# Patient Record
Sex: FEMALE | Race: WHITE | Employment: OTHER | URBAN - METROPOLITAN AREA
[De-identification: names, ages, dates, MRNs, and addresses within clinical notes are randomized per-mention and may not be internally consistent; named-entity substitution may affect disease eponyms.]

---

## 2020-01-08 ENCOUNTER — TRANSFERRED RECORDS (OUTPATIENT)
Dept: HEALTH INFORMATION MANAGEMENT | Facility: CLINIC | Age: 85
End: 2020-01-08

## 2020-01-27 ENCOUNTER — TRANSFERRED RECORDS (OUTPATIENT)
Dept: HEALTH INFORMATION MANAGEMENT | Facility: CLINIC | Age: 85
End: 2020-01-27

## 2020-11-11 ENCOUNTER — VIRTUAL VISIT (OUTPATIENT)
Dept: GERIATRICS | Facility: CLINIC | Age: 85
End: 2020-11-11
Payer: COMMERCIAL

## 2020-11-11 DIAGNOSIS — G30.1 LATE ONSET ALZHEIMER'S DISEASE WITHOUT BEHAVIORAL DISTURBANCE (H): ICD-10-CM

## 2020-11-11 DIAGNOSIS — H35.30 MACULAR DEGENERATION (SENILE) OF RETINA: ICD-10-CM

## 2020-11-11 DIAGNOSIS — M81.0 AGE-RELATED OSTEOPOROSIS WITHOUT CURRENT PATHOLOGICAL FRACTURE: ICD-10-CM

## 2020-11-11 DIAGNOSIS — E78.5 HYPERLIPIDEMIA LDL GOAL <130: Primary | ICD-10-CM

## 2020-11-11 DIAGNOSIS — F02.80 LATE ONSET ALZHEIMER'S DISEASE WITHOUT BEHAVIORAL DISTURBANCE (H): ICD-10-CM

## 2020-11-11 DIAGNOSIS — Z71.89 ACP (ADVANCE CARE PLANNING): ICD-10-CM

## 2020-11-11 DIAGNOSIS — I10 ESSENTIAL HYPERTENSION: ICD-10-CM

## 2020-11-11 ASSESSMENT — MIFFLIN-ST. JEOR: SCORE: 1019.21

## 2020-11-11 NOTE — PROGRESS NOTES
" [facility]  Debi Morton is being evaluated via a billable video.   The patient has been notified of following:  \"This video visit will be conducted via a call between you and your provider. We have found that certain health care needs can be provided without the need for an in-person physical exam.  This service lets us provide the care you need with a video conversation. If during the course of the call the provider feels a video visit is not appropriate, you will not be charged for this service.\"   The provider has received verbal consent for a Video Visit from the patient and or first contact? Yes  Patient/facility staff would like the video invitation sent by: Text to cell phone: [phone]  Video Start Time: 1300  Which Facility the Patient is at during the time of visit: [facility]   PRIMARY CARE PROVIDER AND CLINIC: CESAR Milligan CNP, [address] / [place] [address]  Chief Complaint   Patient presents with     [facility] Medical Record Number:  [id number]  Debi Morton is a 86 year old (11/10/1934), admitted to the above facility from home. Admitted to this facility for medical management and nursing care.  HPI:    HPI information obtained from: facility chart records, facility staff, patient report, [facility] chart review and family/first contact dtr report.   Brief Summary of Hospital Course:   HTN: cont. On quinapril, hydrochlorothiazide.  BPs typically stable.     Hyperlipidemia: cont. On atorvastatin.  No reports of GI distress.  No recent lipid panel    Alzheimer's; STML. Brain MRI done 1/20, results review during f/u Neurology visit 10/7/20.  Brain MRI: moderate volume loss with progressive atrophy of the mesial temporal structures compared to 2012, as well as progression of chronic small vessel disease. MMSE score 19/30.  CPT 4.5.  Has STML.  Started on aricept 5 mg every day at this appt.  Order to increase " aricept to 10 mg every day a month later-has not yet had dose increase.      OP: cont. On fosamax, vit D, ca+.  No reports of GERD    Macular deg.  S/p tear duct surg.  Awaiting medical records.  dtr unsure if wet or dry mac. Deg.  Has taken prednisone eye gtts in past, however dtr believes this was s/p eye surg.  Not currently taking eye gtts.       Updates on Status Since Skilled nursing Admission: STML. Repetitive statements at times. No falls. Did have low grade temp today. Rapid covid test neg. No resp. Distress.     CODE STATUS/ADVANCE DIRECTIVES DISCUSSION: DNR/DNI. POLST in chart    Patient's living condition: lives in an assisted living facility  ALLERGIES: Penicillins  PAST MEDICAL HISTORY:  has a past medical history of Gastroesophageal reflux disease, Hyperlipidemia, and Hypertension.  PAST SURGICAL HISTORY:   has a past surgical history that includes Hysterectomy.  FAMILY HISTORY: family history is not on file.  SOCIAL HISTORY:   reports that she has quit smoking. She has never used smokeless tobacco.  Current Outpatient Medications   Medication Sig Dispense Refill     alendronate (FOSAMAX) 70 MG tablet Take 1 tablet (70 mg) by mouth every 7 days 4 tablet 11     atorvastatin (LIPITOR) 10 MG tablet Take 1 tablet (10 mg) by mouth daily 30 tablet 11     calcium carbonate (TUMS) 500 MG chewable tablet Take 1 tablet (500 mg) by mouth daily 30 tablet 11     cyanocobalamin (VITAMIN B-12) 100 MCG tablet Take 1 tablet (100 mcg) by mouth daily 30 tablet 11     donepezil (ARICEPT) 10 MG tablet Take 1 tablet (10 mg) by mouth daily 30 tablet 11     hydrochlorothiazide (HYDRODIURIL) 25 MG tablet Take 1 tablet (25 mg) by mouth every other day 15 tablet 11     Multiple Vitamins-Minerals (ICAPS AREDS FORMULA) TABS Take 1 tablet by mouth daily       quinapril (ACCUPRIL) 40 MG tablet Take 1 tablet (40 mg) by mouth daily 30 tablet 11     vitamin D2 (ERGOCALCIFEROL) 62387 units (1250 mcg) capsule Take 1 capsule (50,000  "Units) by mouth once a week 4 capsule 11      discharge medications reconciled and changed, per note/orders    ROS: No chest pain, shortness of breath, fevers, chills, headache, nausea, vomiting, dysuria or bowel abnormalities.  Appetite is  normal.  No pain except occ aches.  Vitals:/77   Pulse 64   Temp 99.5  F (37.5  C)   Resp 15   Ht 1.626 m (5' 4\")   Wt 59.4 kg (131 lb)   SpO2 94%   BMI 22.49 kg/m     Limited Visit Exam done given COVID-19 precautions:  GENERAL APPEARANCE:  Alert, in no distress, cooperative  ENT:  Mouth and posterior oropharynx normal, moist mucous membranes, [place]  EYES:  EOM normal, Conjunctiva and lids normal, no drainage  NECK:  FROM  RESP:  no respiratory distress  CV:  no edema, rate-normal  M/S:   Gait and station normal  muscle strength 5/5 all 4 ext.  NEURO:   Cranial nerves 2-12 are normal tested and grossly at patient's baseline, speech clear  PSYCH:  insight and judgement impaired, memory impaired , affect and mood normal    Lab/Diagnostic data:  Recent labs in [facility] reviewed by me today.     ASSESSMENT/PLAN:  Essential hypertension  BPs currently stable  - hydrochlorothiazide (HYDRODIURIL) 25 MG tablet; Take 1 tablet (25 mg) by mouth every other day  - quinapril (ACCUPRIL) 40 MG tablet; Take 1 tablet (40 mg) by mouth daily  1. Cbc, bmp tomorrow  2. Follow BPs, HRs    Hyperlipidemia LDL goal <130  No recent lipid panel. No recent GI distress  - atorvastatin (LIPITOR) 10 MG tablet; Take 1 tablet (10 mg) by mouth daily  1. FLP, tomorrow    Late onset Alzheimer's disease without behavioral disturbance (H)  STML loss  - donepezil (ARICEPT) 10 MG tablet; Take 1 tablet (10 mg) by mouth daily  2. Start med administration per staff  3. Monitor for increased anxiety, changes in mood, behaviors  4. F/u with neurology on a prn basis  5. Monitor for GI distress. Follow po intake, wt.s    Age-related osteoporosis without current pathological fracture  No recent fxs  1. Cont. " Fosamax  2. Cont. Vit D, calcium    Macular degeneration (senile) of retina  No recent vision changes. H/o tear duct surg. Unclear if wet or dry mac. Deg.  1. dtr to make eye appt. At [facility]   2. Monitor for vision changes  3. Monitor for increased tearing.  4. Cont. AREDs    ACP (advance care planning)  Spoke with res. And dtr. Cont. DNR/DNI. POLST in chart         Electronically signed by:  CESAR Milligan CNP     Video-Visit Details  Type of service:  Video Visit  Video End Time (time video stopped): 8093  Distant Location (provider location):  [facility]

## 2020-11-11 NOTE — LETTER
"    11/11/2020        RE: Debi Morton  [address]  [address]  [facility] [address]         [facility]  Debi Morton is being evaluated via a billable video.   The patient has been notified of following:  \"This video visit will be conducted via a call between you and your provider. We have found that certain health care needs can be provided without the need for an in-person physical exam.  This service lets us provide the care you need with a video conversation. If during the course of the call the provider feels a video visit is not appropriate, you will not be charged for this service.\"   The provider has received verbal consent for a Video Visit from the patient and or first contact? Yes  Patient/facility staff would like the video invitation sent by: Text to cell phone: [phone]  Video Start Time: 1300  Which Facility the Patient is at during the time of visit: [facility]   PRIMARY CARE PROVIDER AND CLINIC: Yuri Sweeney, APRN CNP, [address] [place] [address] / [facility] [address]  Chief Complaint   Patient presents with     [facility] Medical Record Number:  [id number]  Debi Morton is a 86 year old (11/10/1934), admitted to the above facility from home. Admitted to this facility for medical management and nursing care.  HPI:    HPI information obtained from: facility chart records, facility staff, patient report, [facility] chart review and family/first contact dtr report.   Brief Summary of Hospital Course:   HTN: cont. On quinapril, hydrochlorothiazide.  BPs typically stable.     Hyperlipidemia: cont. On atorvastatin.  No reports of GI distress.  No recent lipid panel    Alzheimer's; STML. Brain MRI done 1/20, results review during f/u Neurology visit 10/7/20.  Brain MRI: moderate volume loss with progressive atrophy of the mesial temporal structures compared to 2012, as well as progression of chronic " small vessel disease. MMSE score 19/30.  CPT 4.5.  Has STML.  Started on aricept 5 mg every day at this appt.  Order to increase aricept to 10 mg every day a month later-has not yet had dose increase.      OP: cont. On fosamax, vit D, ca+.  No reports of GERD    Macular deg.  S/p tear duct surg.  Awaiting medical records.  dtr unsure if wet or dry mac. Deg.  Has taken prednisone eye gtts in past, however dtr believes this was s/p eye surg.  Not currently taking eye gtts.       Updates on Status Since Skilled nursing Admission: STML. Repetitive statements at times. No falls. Did have low grade temp today. Rapid covid test neg. No resp. Distress.     CODE STATUS/ADVANCE DIRECTIVES DISCUSSION: DNR/DNI. POLST in chart    Patient's living condition: lives in an assisted living facility  ALLERGIES: Penicillins  PAST MEDICAL HISTORY:  has a past medical history of Gastroesophageal reflux disease, Hyperlipidemia, and Hypertension.  PAST SURGICAL HISTORY:   has a past surgical history that includes Hysterectomy.  FAMILY HISTORY: family history is not on file.  SOCIAL HISTORY:   reports that she has quit smoking. She has never used smokeless tobacco.  Current Outpatient Medications   Medication Sig Dispense Refill     alendronate (FOSAMAX) 70 MG tablet Take 1 tablet (70 mg) by mouth every 7 days 4 tablet 11     atorvastatin (LIPITOR) 10 MG tablet Take 1 tablet (10 mg) by mouth daily 30 tablet 11     calcium carbonate (TUMS) 500 MG chewable tablet Take 1 tablet (500 mg) by mouth daily 30 tablet 11     cyanocobalamin (VITAMIN B-12) 100 MCG tablet Take 1 tablet (100 mcg) by mouth daily 30 tablet 11     donepezil (ARICEPT) 10 MG tablet Take 1 tablet (10 mg) by mouth daily 30 tablet 11     hydrochlorothiazide (HYDRODIURIL) 25 MG tablet Take 1 tablet (25 mg) by mouth every other day 15 tablet 11     Multiple Vitamins-Minerals (ICAPS AREDS FORMULA) TABS Take 1 tablet by mouth daily       quinapril (ACCUPRIL) 40 MG tablet Take 1  "tablet (40 mg) by mouth daily 30 tablet 11     vitamin D2 (ERGOCALCIFEROL) 82074 units (1250 mcg) capsule Take 1 capsule (50,000 Units) by mouth once a week 4 capsule 11      discharge medications reconciled and changed, per note/orders    ROS: No chest pain, shortness of breath, fevers, chills, headache, nausea, vomiting, dysuria or bowel abnormalities.  Appetite is  normal.  No pain except occ aches.  Vitals:/77   Pulse 64   Temp 99.5  F (37.5  C)   Resp 15   Ht 1.626 m (5' 4\")   Wt 59.4 kg (131 lb)   SpO2 94%   BMI 22.49 kg/m     Limited Visit Exam done given COVID-19 precautions:  GENERAL APPEARANCE:  Alert, in no distress, cooperative  ENT:  Mouth and posterior oropharynx normal, moist mucous membranes, [place]  EYES:  EOM normal, Conjunctiva and lids normal, no drainage  NECK:  FROM  RESP:  no respiratory distress  CV:  no edema, rate-normal  M/S:   Gait and station normal  muscle strength 5/5 all 4 ext.  NEURO:   Cranial nerves 2-12 are normal tested and grossly at patient's baseline, speech clear  PSYCH:  insight and judgement impaired, memory impaired , affect and mood normal    Lab/Diagnostic data:  Recent labs in [facility] reviewed by me today.     ASSESSMENT/PLAN:  Essential hypertension  BPs currently stable  - hydrochlorothiazide (HYDRODIURIL) 25 MG tablet; Take 1 tablet (25 mg) by mouth every other day  - quinapril (ACCUPRIL) 40 MG tablet; Take 1 tablet (40 mg) by mouth daily  1. Cbc, bmp tomorrow  2. Follow BPs, HRs    Hyperlipidemia LDL goal <130  No recent lipid panel. No recent GI distress  - atorvastatin (LIPITOR) 10 MG tablet; Take 1 tablet (10 mg) by mouth daily  1. FLP, tomorrow    Late onset Alzheimer's disease without behavioral disturbance (H)  STML loss  - donepezil (ARICEPT) 10 MG tablet; Take 1 tablet (10 mg) by mouth daily  2. Start med administration per staff  3. Monitor for increased anxiety, changes in mood, behaviors  4. F/u with neurology on a prn basis  5. Monitor for GI " distress. Follow po intake, wt.s    Age-related osteoporosis without current pathological fracture  No recent fxs  1. Cont. Fosamax  2. Cont. Vit D, calcium    Macular degeneration (senile) of retina  No recent vision changes. H/o tear duct surg. Unclear if wet or dry mac. Deg.  1. dtr to make eye appt. At [facility]   2. Monitor for vision changes  3. Monitor for increased tearing.  4. Cont. AREDs    ACP (advance care planning)  Spoke with res. And dtr. Cont. DNR/DNI. POLST in chart         Electronically signed by:  CESAR Milligan CNP     Video-Visit Details  Type of service:  Video Visit  Video End Time (time video stopped): 1339  Distant Location (provider location):  [facility]                   Sincerely,        CESAR Milligan CNP

## 2020-11-12 ENCOUNTER — AMBULATORY - HEALTHEAST (OUTPATIENT)
Dept: OTHER | Facility: CLINIC | Age: 85
End: 2020-11-12

## 2020-11-12 ENCOUNTER — DOCUMENTATION ONLY (OUTPATIENT)
Dept: OTHER | Facility: CLINIC | Age: 85
End: 2020-11-12

## 2020-11-12 VITALS
OXYGEN SATURATION: 94 % | TEMPERATURE: 99.5 F | RESPIRATION RATE: 15 BRPM | HEIGHT: 64 IN | DIASTOLIC BLOOD PRESSURE: 77 MMHG | SYSTOLIC BLOOD PRESSURE: 133 MMHG | HEART RATE: 64 BPM | BODY MASS INDEX: 22.36 KG/M2 | WEIGHT: 131 LBS

## 2020-11-12 RX ORDER — ATORVASTATIN CALCIUM 10 MG/1
10 TABLET, FILM COATED ORAL DAILY
Qty: 30 TABLET | Refills: 11 | Status: SHIPPED | OUTPATIENT
Start: 2020-11-12 | End: 2021-01-01

## 2020-11-12 RX ORDER — VIT A/VIT C/VIT E/ZINC/COPPER 7160-113
1 TABLET, DELAYED RELEASE (ENTERIC COATED) ORAL DAILY
COMMUNITY
End: 2020-11-12

## 2020-11-12 RX ORDER — ALENDRONATE SODIUM 70 MG/1
70 TABLET ORAL
Qty: 4 TABLET | Refills: 11 | Status: SHIPPED | OUTPATIENT
Start: 2020-11-12 | End: 2021-01-01

## 2020-11-12 RX ORDER — CALCIUM CARBONATE 500 MG/1
1 TABLET, CHEWABLE ORAL DAILY
Qty: 30 TABLET | Refills: 11 | Status: SHIPPED | OUTPATIENT
Start: 2020-11-12 | End: 2021-01-01

## 2020-11-12 RX ORDER — VIT A/VIT C/VIT E/ZINC/COPPER 2148-113
1 TABLET ORAL DAILY
Qty: 30 TABLET | Refills: 11 | Status: SHIPPED | OUTPATIENT
Start: 2020-11-12 | End: 2021-01-01

## 2020-11-12 RX ORDER — DONEPEZIL HYDROCHLORIDE 10 MG/1
10 TABLET, FILM COATED ORAL DAILY
Qty: 30 TABLET | Refills: 11 | Status: SHIPPED | OUTPATIENT
Start: 2020-11-12 | End: 2021-01-01

## 2020-11-12 RX ORDER — ERGOCALCIFEROL 1.25 MG/1
50000 CAPSULE, LIQUID FILLED ORAL WEEKLY
Qty: 4 CAPSULE | Refills: 11 | Status: SHIPPED | OUTPATIENT
Start: 2020-11-12 | End: 2020-11-23

## 2020-11-12 RX ORDER — UBIDECARENONE 75 MG
100 CAPSULE ORAL DAILY
Qty: 30 TABLET | Refills: 11 | Status: SHIPPED | OUTPATIENT
Start: 2020-11-12 | End: 2021-01-01

## 2020-11-12 RX ORDER — QUINAPRIL 40 MG/1
40 TABLET ORAL DAILY
Qty: 30 TABLET | Refills: 11 | Status: SHIPPED | OUTPATIENT
Start: 2020-11-12 | End: 2021-01-01

## 2020-11-12 RX ORDER — HYDROCHLOROTHIAZIDE 25 MG/1
25 TABLET ORAL EVERY OTHER DAY
Qty: 15 TABLET | Refills: 11 | Status: SHIPPED | OUTPATIENT
Start: 2020-11-12 | End: 2021-01-01

## 2020-11-16 ENCOUNTER — RECORDS - HEALTHEAST (OUTPATIENT)
Dept: LAB | Facility: CLINIC | Age: 85
End: 2020-11-16

## 2020-11-16 LAB
ALT SERPL W P-5'-P-CCNC: 9 U/L (ref 0–45)
ANION GAP SERPL CALCULATED.3IONS-SCNC: 9 MMOL/L (ref 5–18)
AST SERPL W P-5'-P-CCNC: 16 U/L (ref 0–40)
BASOPHILS # BLD AUTO: 0.1 THOU/UL (ref 0–0.2)
BASOPHILS NFR BLD AUTO: 1 % (ref 0–2)
BUN SERPL-MCNC: 24 MG/DL (ref 8–28)
CALCIUM SERPL-MCNC: 9.9 MG/DL (ref 8.5–10.5)
CHLORIDE BLD-SCNC: 100 MMOL/L (ref 98–107)
CHOLEST SERPL-MCNC: 171 MG/DL
CO2 SERPL-SCNC: 33 MMOL/L (ref 22–31)
CREAT SERPL-MCNC: 0.8 MG/DL (ref 0.6–1.1)
EOSINOPHIL # BLD AUTO: 0.1 THOU/UL (ref 0–0.4)
EOSINOPHIL NFR BLD AUTO: 1 % (ref 0–6)
ERYTHROCYTE [DISTWIDTH] IN BLOOD BY AUTOMATED COUNT: 13.5 % (ref 11–14.5)
FASTING STATUS PATIENT QL REPORTED: NORMAL
GFR SERPL CREATININE-BSD FRML MDRD: >60 ML/MIN/1.73M2
GLUCOSE BLD-MCNC: 119 MG/DL (ref 70–125)
HCT VFR BLD AUTO: 39.8 % (ref 35–47)
HDLC SERPL-MCNC: 55 MG/DL
HGB BLD-MCNC: 13 G/DL (ref 12–16)
IMM GRANULOCYTES # BLD: 0 THOU/UL
IMM GRANULOCYTES NFR BLD: 1 %
LDLC SERPL CALC-MCNC: 101 MG/DL
LYMPHOCYTES # BLD AUTO: 0.9 THOU/UL (ref 0.8–4.4)
LYMPHOCYTES NFR BLD AUTO: 11 % (ref 20–40)
MCH RBC QN AUTO: 31.1 PG (ref 27–34)
MCHC RBC AUTO-ENTMCNC: 32.7 G/DL (ref 32–36)
MCV RBC AUTO: 95 FL (ref 80–100)
MONOCYTES # BLD AUTO: 0.6 THOU/UL (ref 0–0.9)
MONOCYTES NFR BLD AUTO: 8 % (ref 2–10)
NEUTROPHILS # BLD AUTO: 6.3 THOU/UL (ref 2–7.7)
NEUTROPHILS NFR BLD AUTO: 79 % (ref 50–70)
PLATELET # BLD AUTO: 686 THOU/UL (ref 140–440)
PMV BLD AUTO: 9.6 FL (ref 8.5–12.5)
POTASSIUM BLD-SCNC: 4.1 MMOL/L (ref 3.5–5)
RBC # BLD AUTO: 4.18 MILL/UL (ref 3.8–5.4)
SODIUM SERPL-SCNC: 142 MMOL/L (ref 136–145)
TRIGL SERPL-MCNC: 74 MG/DL
WBC: 8 THOU/UL (ref 4–11)

## 2020-11-23 ENCOUNTER — TELEPHONE (OUTPATIENT)
Dept: GERIATRICS | Facility: CLINIC | Age: 85
End: 2020-11-23

## 2020-11-23 DIAGNOSIS — E56.9 VITAMIN DEFICIENCY: Primary | ICD-10-CM

## 2020-11-23 RX ORDER — ERGOCALCIFEROL 1.25 MG/1
50000 CAPSULE, LIQUID FILLED ORAL WEEKLY
Qty: 4 CAPSULE | Refills: 11 | Status: SHIPPED | OUTPATIENT
Start: 2020-11-23 | End: 2021-01-01

## 2021-01-01 ENCOUNTER — LAB REQUISITION (OUTPATIENT)
Dept: LAB | Facility: CLINIC | Age: 86
End: 2021-01-01
Payer: COMMERCIAL

## 2021-01-01 ENCOUNTER — MEDICAL CORRESPONDENCE (OUTPATIENT)
Dept: HEALTH INFORMATION MANAGEMENT | Facility: CLINIC | Age: 86
End: 2021-01-01
Payer: COMMERCIAL

## 2021-01-01 ENCOUNTER — TELEPHONE (OUTPATIENT)
Dept: ORTHOPEDICS | Facility: CLINIC | Age: 86
End: 2021-01-01

## 2021-01-01 ENCOUNTER — APPOINTMENT (OUTPATIENT)
Dept: PHYSICAL THERAPY | Facility: CLINIC | Age: 86
DRG: 482 | End: 2021-01-01
Payer: COMMERCIAL

## 2021-01-01 ENCOUNTER — APPOINTMENT (OUTPATIENT)
Dept: OCCUPATIONAL THERAPY | Facility: CLINIC | Age: 86
DRG: 482 | End: 2021-01-01
Payer: COMMERCIAL

## 2021-01-01 ENCOUNTER — ANCILLARY PROCEDURE (OUTPATIENT)
Dept: GENERAL RADIOLOGY | Facility: CLINIC | Age: 86
End: 2021-01-01
Attending: ORTHOPAEDIC SURGERY
Payer: COMMERCIAL

## 2021-01-01 ENCOUNTER — HEALTH MAINTENANCE LETTER (OUTPATIENT)
Age: 86
End: 2021-01-01

## 2021-01-01 ENCOUNTER — OFFICE VISIT (OUTPATIENT)
Dept: ORTHOPEDICS | Facility: CLINIC | Age: 86
End: 2021-01-01
Payer: COMMERCIAL

## 2021-01-01 ENCOUNTER — APPOINTMENT (OUTPATIENT)
Dept: GENERAL RADIOLOGY | Facility: CLINIC | Age: 86
DRG: 482 | End: 2021-01-01
Payer: COMMERCIAL

## 2021-01-01 ENCOUNTER — PATIENT OUTREACH (OUTPATIENT)
Dept: CARE COORDINATION | Facility: CLINIC | Age: 86
End: 2021-01-01

## 2021-01-01 ENCOUNTER — ASSISTED LIVING VISIT (OUTPATIENT)
Dept: GERIATRICS | Facility: CLINIC | Age: 86
End: 2021-01-01
Payer: COMMERCIAL

## 2021-01-01 ENCOUNTER — DOCUMENTATION ONLY (OUTPATIENT)
Dept: OTHER | Facility: CLINIC | Age: 86
End: 2021-01-01

## 2021-01-01 ENCOUNTER — MYC MEDICAL ADVICE (OUTPATIENT)
Dept: GERIATRICS | Facility: CLINIC | Age: 86
End: 2021-01-01

## 2021-01-01 ENCOUNTER — DOCUMENTATION ONLY (OUTPATIENT)
Dept: OTHER | Facility: CLINIC | Age: 86
End: 2021-01-01
Payer: COMMERCIAL

## 2021-01-01 ENCOUNTER — DOCUMENTATION ONLY (OUTPATIENT)
Dept: ORTHOPEDICS | Facility: CLINIC | Age: 86
End: 2021-01-01

## 2021-01-01 ENCOUNTER — TRANSFERRED RECORDS (OUTPATIENT)
Dept: HEALTH INFORMATION MANAGEMENT | Facility: CLINIC | Age: 86
End: 2021-01-01

## 2021-01-01 VITALS
DIASTOLIC BLOOD PRESSURE: 48 MMHG | SYSTOLIC BLOOD PRESSURE: 123 MMHG | TEMPERATURE: 97.4 F | BODY MASS INDEX: 25.71 KG/M2 | HEART RATE: 64 BPM | OXYGEN SATURATION: 99 % | WEIGHT: 160 LBS | HEIGHT: 66 IN | RESPIRATION RATE: 16 BRPM

## 2021-01-01 VITALS
HEART RATE: 69 BPM | TEMPERATURE: 97.3 F | WEIGHT: 114.8 LBS | BODY MASS INDEX: 18.53 KG/M2 | RESPIRATION RATE: 19 BRPM | DIASTOLIC BLOOD PRESSURE: 60 MMHG | SYSTOLIC BLOOD PRESSURE: 116 MMHG

## 2021-01-01 VITALS — TEMPERATURE: 96.9 F

## 2021-01-01 DIAGNOSIS — D62 ANEMIA DUE TO BLOOD LOSS, ACUTE: ICD-10-CM

## 2021-01-01 DIAGNOSIS — S72.002D CLOSED FRACTURE OF LEFT HIP WITH ROUTINE HEALING, SUBSEQUENT ENCOUNTER: Primary | ICD-10-CM

## 2021-01-01 DIAGNOSIS — S72.002A CLOSED FRACTURE OF LEFT HIP, INITIAL ENCOUNTER (H): ICD-10-CM

## 2021-01-01 DIAGNOSIS — Z87.81 S/P ORIF (OPEN REDUCTION INTERNAL FIXATION) FRACTURE: ICD-10-CM

## 2021-01-01 DIAGNOSIS — K30 INDIGESTION: ICD-10-CM

## 2021-01-01 DIAGNOSIS — I10 ESSENTIAL HYPERTENSION: ICD-10-CM

## 2021-01-01 DIAGNOSIS — E78.5 HYPERLIPIDEMIA LDL GOAL <130: ICD-10-CM

## 2021-01-01 DIAGNOSIS — S72.002D CLOSED FRACTURE OF LEFT HIP WITH ROUTINE HEALING, SUBSEQUENT ENCOUNTER: ICD-10-CM

## 2021-01-01 DIAGNOSIS — E78.5 HYPERLIPIDEMIA, UNSPECIFIED HYPERLIPIDEMIA TYPE: ICD-10-CM

## 2021-01-01 DIAGNOSIS — F02.80 LATE ONSET ALZHEIMER'S DISEASE WITHOUT BEHAVIORAL DISTURBANCE (H): ICD-10-CM

## 2021-01-01 DIAGNOSIS — F02.80 LATE ONSET ALZHEIMER'S DISEASE WITHOUT BEHAVIORAL DISTURBANCE (H): Primary | ICD-10-CM

## 2021-01-01 DIAGNOSIS — D64.9 ANEMIA, UNSPECIFIED: ICD-10-CM

## 2021-01-01 DIAGNOSIS — R53.81 PHYSICAL DECONDITIONING: ICD-10-CM

## 2021-01-01 DIAGNOSIS — G30.1 LATE ONSET ALZHEIMER'S DISEASE WITHOUT BEHAVIORAL DISTURBANCE (H): ICD-10-CM

## 2021-01-01 DIAGNOSIS — S72.002A CLOSED FRACTURE OF LEFT HIP, INITIAL ENCOUNTER (H): Primary | ICD-10-CM

## 2021-01-01 DIAGNOSIS — E87.6 HYPOKALEMIA: ICD-10-CM

## 2021-01-01 DIAGNOSIS — Z53.9 DIAGNOSIS NOT YET DEFINED: Primary | ICD-10-CM

## 2021-01-01 DIAGNOSIS — E56.9 VITAMIN DEFICIENCY: ICD-10-CM

## 2021-01-01 DIAGNOSIS — U07.1 COVID-19: ICD-10-CM

## 2021-01-01 DIAGNOSIS — H35.30 MACULAR DEGENERATION (SENILE) OF RETINA: ICD-10-CM

## 2021-01-01 DIAGNOSIS — Z98.890 S/P ORIF (OPEN REDUCTION INTERNAL FIXATION) FRACTURE: ICD-10-CM

## 2021-01-01 DIAGNOSIS — M80.00XD AGE-RELATED OSTEOPOROSIS WITH CURRENT PATHOLOGICAL FRACTURE WITH ROUTINE HEALING, SUBSEQUENT ENCOUNTER: ICD-10-CM

## 2021-01-01 DIAGNOSIS — E55.9 VITAMIN D DEFICIENCY: ICD-10-CM

## 2021-01-01 DIAGNOSIS — M81.0 AGE-RELATED OSTEOPOROSIS WITHOUT CURRENT PATHOLOGICAL FRACTURE: Primary | ICD-10-CM

## 2021-01-01 DIAGNOSIS — G30.1 LATE ONSET ALZHEIMER'S DISEASE WITHOUT BEHAVIORAL DISTURBANCE (H): Primary | ICD-10-CM

## 2021-01-01 DIAGNOSIS — D64.9 ANEMIA, UNSPECIFIED: Primary | ICD-10-CM

## 2021-01-01 LAB
ALBUMIN UR-MCNC: 20 MG/DL
ANION GAP SERPL CALCULATED.3IONS-SCNC: 11 MMOL/L (ref 5–18)
APPEARANCE UR: ABNORMAL
BACTERIA #/AREA URNS HPF: ABNORMAL /HPF
BACTERIA SPEC CULT: ABNORMAL
BILIRUB UR QL STRIP: NEGATIVE
BUN SERPL-MCNC: 17 MG/DL (ref 8–28)
CALCIUM SERPL-MCNC: 10 MG/DL (ref 8.5–10.5)
CHLORIDE BLD-SCNC: 97 MMOL/L (ref 98–107)
CO2 SERPL-SCNC: 30 MMOL/L (ref 22–31)
COLOR UR AUTO: YELLOW
CREAT SERPL-MCNC: 0.62 MG/DL (ref 0.52–1.04)
CREAT SERPL-MCNC: 0.62 MG/DL (ref 0.52–1.04)
CREAT SERPL-MCNC: 0.63 MG/DL (ref 0.52–1.04)
CREAT SERPL-MCNC: 0.72 MG/DL (ref 0.6–1.1)
GFR SERPL CREATININE-BSD FRML MDRD: 76 ML/MIN/1.73M2
GFR SERPL CREATININE-BSD FRML MDRD: 81 ML/MIN/{1.73_M2}
GLUCOSE BLD-MCNC: 87 MG/DL (ref 70–125)
GLUCOSE UR STRIP-MCNC: NEGATIVE MG/DL
HGB BLD-MCNC: 11.8 G/DL (ref 11.7–15.7)
HGB UR QL STRIP: ABNORMAL
KETONES UR STRIP-MCNC: 10 MG/DL
LEUKOCYTE ESTERASE UR QL STRIP: ABNORMAL
Lab: ABNORMAL
MUCOUS THREADS #/AREA URNS LPF: PRESENT /LPF
NITRATE UR QL: NEGATIVE
PH UR STRIP: 5.5 PH (ref 5–7)
PLATELET # BLD AUTO: 687 10E9/L (ref 150–450)
PLATELET # BLD AUTO: 687 10E9/L (ref 150–450)
PLATELET # BLD AUTO: 720 10E9/L (ref 150–450)
POTASSIUM BLD-SCNC: 3.9 MMOL/L (ref 3.5–5)
RBC #/AREA URNS AUTO: 2 /HPF (ref 0–2)
SARS-COV-2 RNA RESP QL NAA+PROBE: NEGATIVE
SARS-COV-2 RNA RESP QL NAA+PROBE: NEGATIVE
SODIUM SERPL-SCNC: 138 MMOL/L (ref 136–145)
SOURCE: ABNORMAL
SP GR UR STRIP: 1.03 (ref 1–1.03)
SPECIMEN SOURCE: ABNORMAL
SQUAMOUS #/AREA URNS AUTO: 0 /HPF (ref 0–1)
UROBILINOGEN UR STRIP-MCNC: NORMAL MG/DL (ref 0–2)
WBC #/AREA URNS AUTO: 25 /HPF (ref 0–5)

## 2021-01-01 PROCEDURE — 0HBRXZZ EXCISION OF TOE NAIL, EXTERNAL APPROACH: ICD-10-PCS | Performed by: PODIATRIST

## 2021-01-01 PROCEDURE — 250N000013 HC RX MED GY IP 250 OP 250 PS 637: Performed by: INTERNAL MEDICINE

## 2021-01-01 PROCEDURE — 97535 SELF CARE MNGMENT TRAINING: CPT | Mod: GO | Performed by: OCCUPATIONAL THERAPIST

## 2021-01-01 PROCEDURE — 99233 SBSQ HOSP IP/OBS HIGH 50: CPT | Performed by: INTERNAL MEDICINE

## 2021-01-01 PROCEDURE — 97110 THERAPEUTIC EXERCISES: CPT | Mod: GP

## 2021-01-01 PROCEDURE — 120N000002 HC R&B MED SURG/OB UMMC

## 2021-01-01 PROCEDURE — 73552 X-RAY EXAM OF FEMUR 2/>: CPT | Mod: LT | Performed by: RADIOLOGY

## 2021-01-01 PROCEDURE — 250N000011 HC RX IP 250 OP 636

## 2021-01-01 PROCEDURE — 97530 THERAPEUTIC ACTIVITIES: CPT | Mod: GP

## 2021-01-01 PROCEDURE — 97165 OT EVAL LOW COMPLEX 30 MIN: CPT | Mod: GO

## 2021-01-01 PROCEDURE — 99232 SBSQ HOSP IP/OBS MODERATE 35: CPT | Performed by: INTERNAL MEDICINE

## 2021-01-01 PROCEDURE — 36415 COLL VENOUS BLD VENIPUNCTURE: CPT | Mod: ORL | Performed by: NURSE PRACTITIONER

## 2021-01-01 PROCEDURE — 82565 ASSAY OF CREATININE: CPT

## 2021-01-01 PROCEDURE — 85018 HEMOGLOBIN: CPT | Mod: ORL | Performed by: NURSE PRACTITIONER

## 2021-01-01 PROCEDURE — 99024 POSTOP FOLLOW-UP VISIT: CPT | Performed by: ORTHOPAEDIC SURGERY

## 2021-01-01 PROCEDURE — 250N000013 HC RX MED GY IP 250 OP 250 PS 637: Performed by: PHYSICIAN ASSISTANT

## 2021-01-01 PROCEDURE — 99207 PR CDG-MDM COMPONENT: MEETS HIGH - UP CODED: CPT | Performed by: INTERNAL MEDICINE

## 2021-01-01 PROCEDURE — 999N000065 XR PELVIS AND HIP LEFT 2 VIEWS

## 2021-01-01 PROCEDURE — 97530 THERAPEUTIC ACTIVITIES: CPT | Mod: GO | Performed by: OCCUPATIONAL THERAPIST

## 2021-01-01 PROCEDURE — 97535 SELF CARE MNGMENT TRAINING: CPT | Mod: GO

## 2021-01-01 PROCEDURE — U0005 INFEC AGEN DETEC AMPLI PROBE: HCPCS | Mod: ORL | Performed by: FAMILY MEDICINE

## 2021-01-01 PROCEDURE — 99024 POSTOP FOLLOW-UP VISIT: CPT | Mod: GC | Performed by: ORTHOPAEDIC SURGERY

## 2021-01-01 PROCEDURE — 85049 AUTOMATED PLATELET COUNT: CPT

## 2021-01-01 PROCEDURE — 87186 SC STD MICRODIL/AGAR DIL: CPT | Performed by: INTERNAL MEDICINE

## 2021-01-01 PROCEDURE — P9604 ONE-WAY ALLOW PRORATED TRIP: HCPCS | Mod: ORL | Performed by: NURSE PRACTITIONER

## 2021-01-01 PROCEDURE — 250N000011 HC RX IP 250 OP 636: Performed by: EMERGENCY MEDICINE

## 2021-01-01 PROCEDURE — 11719 TRIM NAIL(S) ANY NUMBER: CPT | Mod: Q8 | Performed by: PODIATRIST

## 2021-01-01 PROCEDURE — 99207 PR CDG-CUT & PASTE-POTENTIAL IMPACT ON LEVEL: CPT | Performed by: INTERNAL MEDICINE

## 2021-01-01 PROCEDURE — 87086 URINE CULTURE/COLONY COUNT: CPT | Performed by: INTERNAL MEDICINE

## 2021-01-01 PROCEDURE — 258N000003 HC RX IP 258 OP 636: Performed by: EMERGENCY MEDICINE

## 2021-01-01 PROCEDURE — 36415 COLL VENOUS BLD VENIPUNCTURE: CPT

## 2021-01-01 PROCEDURE — 80048 BASIC METABOLIC PNL TOTAL CA: CPT | Mod: ORL | Performed by: NURSE PRACTITIONER

## 2021-01-01 PROCEDURE — 73560 X-RAY EXAM OF KNEE 1 OR 2: CPT | Mod: 50

## 2021-01-01 PROCEDURE — 73560 X-RAY EXAM OF KNEE 1 OR 2: CPT | Mod: 26 | Performed by: RADIOLOGY

## 2021-01-01 PROCEDURE — 97530 THERAPEUTIC ACTIVITIES: CPT | Mod: GO

## 2021-01-01 PROCEDURE — 81001 URINALYSIS AUTO W/SCOPE: CPT | Performed by: INTERNAL MEDICINE

## 2021-01-01 PROCEDURE — 97110 THERAPEUTIC EXERCISES: CPT | Mod: GO | Performed by: OCCUPATIONAL THERAPIST

## 2021-01-01 PROCEDURE — 99207 PR MD CERTIFICATION HHA PATIENT: CPT | Performed by: ORTHOPAEDIC SURGERY

## 2021-01-01 PROCEDURE — 87088 URINE BACTERIA CULTURE: CPT | Performed by: INTERNAL MEDICINE

## 2021-01-01 RX ORDER — ALENDRONATE SODIUM 70 MG/1
TABLET ORAL
Qty: 4 TABLET | Refills: 97 | Status: SHIPPED | OUTPATIENT
Start: 2021-01-01

## 2021-01-01 RX ORDER — BISACODYL 10 MG
10 SUPPOSITORY, RECTAL RECTAL DAILY PRN
Status: DISCONTINUED | OUTPATIENT
Start: 2021-01-01 | End: 2021-01-01 | Stop reason: HOSPADM

## 2021-01-01 RX ORDER — VIT A/VIT C/VIT E/ZINC/COPPER 2148-113
TABLET ORAL
Qty: 28 TABLET | Status: SHIPPED | OUTPATIENT
Start: 2021-01-01

## 2021-01-01 RX ORDER — UBIDECARENONE 75 MG
CAPSULE ORAL
Qty: 31 TABLET | Refills: 11 | Status: SHIPPED | OUTPATIENT
Start: 2021-01-01

## 2021-01-01 RX ORDER — AMOXICILLIN 250 MG
1-2 CAPSULE ORAL 2 TIMES DAILY PRN
Status: DISCONTINUED | OUTPATIENT
Start: 2021-01-01 | End: 2021-01-01 | Stop reason: HOSPADM

## 2021-01-01 RX ORDER — QUINAPRIL 10 MG/1
10 TABLET ORAL AT BEDTIME
COMMUNITY
End: 2021-01-01

## 2021-01-01 RX ORDER — CALCIUM CARBONATE 500 MG/1
TABLET, CHEWABLE ORAL
Qty: 30 TABLET | Refills: 11 | Status: SHIPPED | OUTPATIENT
Start: 2021-01-01

## 2021-01-01 RX ORDER — POTASSIUM CHLORIDE 1500 MG/1
TABLET, EXTENDED RELEASE ORAL
Qty: 28 TABLET | Status: SHIPPED | OUTPATIENT
Start: 2021-01-01 | End: 2022-01-01

## 2021-01-01 RX ORDER — HYDROCHLOROTHIAZIDE 25 MG/1
TABLET ORAL
Qty: 14 TABLET | Status: SHIPPED | OUTPATIENT
Start: 2021-01-01 | End: 2022-01-01

## 2021-01-01 RX ORDER — AMOXICILLIN 250 MG
1-2 CAPSULE ORAL 2 TIMES DAILY PRN
Qty: 30 TABLET | Refills: 0 | DISCHARGE
Start: 2021-01-01 | End: 2021-01-01

## 2021-01-01 RX ORDER — TRAZODONE HYDROCHLORIDE 50 MG/1
25 TABLET, FILM COATED ORAL AT BEDTIME
COMMUNITY
End: 2021-01-01

## 2021-01-01 RX ORDER — DONEPEZIL HYDROCHLORIDE 10 MG/1
TABLET, FILM COATED ORAL
Qty: 28 TABLET | Status: SHIPPED | OUTPATIENT
Start: 2021-01-01 | End: 2022-01-01 | Stop reason: DRUGHIGH

## 2021-01-01 RX ORDER — TRAZODONE HYDROCHLORIDE 50 MG/1
TABLET, FILM COATED ORAL
Qty: 14 TABLET | Status: SHIPPED | OUTPATIENT
Start: 2021-01-01 | End: 2022-01-01

## 2021-01-01 RX ORDER — ATORVASTATIN CALCIUM 10 MG/1
TABLET, FILM COATED ORAL
Qty: 28 TABLET | Status: SHIPPED | OUTPATIENT
Start: 2021-01-01

## 2021-01-01 RX ORDER — ASPIRIN 81 MG
TABLET,CHEWABLE ORAL
Qty: 36 TABLET | Status: SHIPPED | OUTPATIENT
Start: 2021-01-01

## 2021-01-01 RX ORDER — LISINOPRIL 20 MG/1
40 TABLET ORAL DAILY
Status: DISCONTINUED | OUTPATIENT
Start: 2021-01-01 | End: 2021-01-01 | Stop reason: HOSPADM

## 2021-01-01 RX ORDER — ASPIRIN 81 MG/1
81 TABLET, CHEWABLE ORAL DAILY
COMMUNITY
End: 2021-01-01

## 2021-01-01 RX ORDER — POLYETHYLENE GLYCOL 3350 17 G/17G
17 POWDER, FOR SOLUTION ORAL DAILY
Status: DISCONTINUED | OUTPATIENT
Start: 2021-01-01 | End: 2021-01-01 | Stop reason: HOSPADM

## 2021-01-01 RX ORDER — POLYETHYLENE GLYCOL 3350 17 G/17G
17 POWDER, FOR SOLUTION ORAL DAILY
Qty: 7 PACKET | Refills: 0 | Status: SHIPPED | OUTPATIENT
Start: 2021-01-01 | End: 2022-01-01

## 2021-01-01 RX ORDER — QUINAPRIL 10 MG/1
TABLET ORAL
Qty: 34 TABLET | Status: SHIPPED | OUTPATIENT
Start: 2021-01-01 | End: 2022-01-01

## 2021-01-01 RX ORDER — ASPIRIN 325 MG
162 TABLET ORAL DAILY
Qty: 30 TABLET | Refills: 0 | DISCHARGE
Start: 2021-01-01 | End: 2021-01-01

## 2021-01-01 RX ORDER — ERGOCALCIFEROL 1.25 MG/1
CAPSULE, LIQUID FILLED ORAL
Qty: 4 CAPSULE | Status: SHIPPED | OUTPATIENT
Start: 2021-01-01

## 2021-01-01 RX ADMIN — LISINOPRIL 40 MG: 20 TABLET ORAL at 08:19

## 2021-01-01 RX ADMIN — Medication 100 MCG: at 07:55

## 2021-01-01 RX ADMIN — ACETAMINOPHEN 975 MG: 325 TABLET, FILM COATED ORAL at 19:56

## 2021-01-01 RX ADMIN — CALCIUM CARBONATE (ANTACID) CHEW TAB 500 MG 500 MG: 500 CHEW TAB at 08:25

## 2021-01-01 RX ADMIN — ACETAMINOPHEN 975 MG: 325 TABLET, FILM COATED ORAL at 07:56

## 2021-01-01 RX ADMIN — METHOCARBAMOL 500 MG: 500 TABLET, FILM COATED ORAL at 21:11

## 2021-01-01 RX ADMIN — SODIUM CHLORIDE: 9 INJECTION, SOLUTION INTRAVENOUS at 22:30

## 2021-01-01 RX ADMIN — METHOCARBAMOL 500 MG: 500 TABLET, FILM COATED ORAL at 13:25

## 2021-01-01 RX ADMIN — METHOCARBAMOL 500 MG: 500 TABLET, FILM COATED ORAL at 13:20

## 2021-01-01 RX ADMIN — METHOCARBAMOL 500 MG: 500 TABLET, FILM COATED ORAL at 19:56

## 2021-01-01 RX ADMIN — CALCIUM CARBONATE (ANTACID) CHEW TAB 500 MG 500 MG: 500 CHEW TAB at 08:43

## 2021-01-01 RX ADMIN — ACETAMINOPHEN 975 MG: 325 TABLET, FILM COATED ORAL at 22:46

## 2021-01-01 RX ADMIN — ENOXAPARIN SODIUM 40 MG: 40 INJECTION SUBCUTANEOUS at 08:46

## 2021-01-01 RX ADMIN — CALCIUM CARBONATE (ANTACID) CHEW TAB 500 MG 500 MG: 500 CHEW TAB at 08:09

## 2021-01-01 RX ADMIN — Medication 100 MCG: at 08:09

## 2021-01-01 RX ADMIN — ACETAMINOPHEN 975 MG: 325 TABLET, FILM COATED ORAL at 09:03

## 2021-01-01 RX ADMIN — ACETAMINOPHEN 975 MG: 325 TABLET, FILM COATED ORAL at 13:20

## 2021-01-01 RX ADMIN — ACETAMINOPHEN 975 MG: 325 TABLET, FILM COATED ORAL at 19:47

## 2021-01-01 RX ADMIN — ACETAMINOPHEN 975 MG: 325 TABLET, FILM COATED ORAL at 08:29

## 2021-01-01 RX ADMIN — ENOXAPARIN SODIUM 40 MG: 40 INJECTION SUBCUTANEOUS at 08:18

## 2021-01-01 RX ADMIN — Medication 2.5 MG: at 06:00

## 2021-01-01 RX ADMIN — Medication 100 MCG: at 08:29

## 2021-01-01 RX ADMIN — ACETAMINOPHEN 975 MG: 325 TABLET, FILM COATED ORAL at 16:27

## 2021-01-01 RX ADMIN — ACETAMINOPHEN 975 MG: 325 TABLET, FILM COATED ORAL at 21:08

## 2021-01-01 RX ADMIN — Medication 100 MCG: at 08:19

## 2021-01-01 RX ADMIN — METHOCARBAMOL 500 MG: 500 TABLET, FILM COATED ORAL at 07:57

## 2021-01-01 RX ADMIN — ACETAMINOPHEN 975 MG: 325 TABLET, FILM COATED ORAL at 13:02

## 2021-01-01 RX ADMIN — METHOCARBAMOL 500 MG: 500 TABLET, FILM COATED ORAL at 22:45

## 2021-01-01 RX ADMIN — METHOCARBAMOL 500 MG: 500 TABLET, FILM COATED ORAL at 12:31

## 2021-01-01 RX ADMIN — METHOCARBAMOL 500 MG: 500 TABLET, FILM COATED ORAL at 20:18

## 2021-01-01 RX ADMIN — DONEPEZIL HYDROCHLORIDE 10 MG: 10 TABLET ORAL at 22:41

## 2021-01-01 RX ADMIN — METHOCARBAMOL 500 MG: 500 TABLET, FILM COATED ORAL at 17:55

## 2021-01-01 RX ADMIN — POLYETHYLENE GLYCOL 3350 17 G: 17 POWDER, FOR SOLUTION ORAL at 07:55

## 2021-01-01 RX ADMIN — METHOCARBAMOL 500 MG: 500 TABLET, FILM COATED ORAL at 18:21

## 2021-01-01 RX ADMIN — DONEPEZIL HYDROCHLORIDE 10 MG: 10 TABLET ORAL at 22:28

## 2021-01-01 RX ADMIN — Medication 2.5 MG: at 17:47

## 2021-01-01 RX ADMIN — ENOXAPARIN SODIUM 40 MG: 40 INJECTION SUBCUTANEOUS at 07:59

## 2021-01-01 RX ADMIN — ATORVASTATIN CALCIUM 10 MG: 10 TABLET, FILM COATED ORAL at 08:19

## 2021-01-01 RX ADMIN — ACETAMINOPHEN 975 MG: 325 TABLET, FILM COATED ORAL at 15:38

## 2021-01-01 RX ADMIN — Medication 2.5 MG: at 10:43

## 2021-01-01 RX ADMIN — ATORVASTATIN CALCIUM 10 MG: 10 TABLET, FILM COATED ORAL at 08:29

## 2021-01-01 RX ADMIN — METHOCARBAMOL 500 MG: 500 TABLET, FILM COATED ORAL at 07:46

## 2021-01-01 RX ADMIN — ATORVASTATIN CALCIUM 10 MG: 10 TABLET, FILM COATED ORAL at 09:19

## 2021-01-01 RX ADMIN — DOCUSATE SODIUM AND SENNOSIDES 2 TABLET: 8.6; 5 TABLET ORAL at 08:29

## 2021-01-01 RX ADMIN — ACETAMINOPHEN 975 MG: 325 TABLET, FILM COATED ORAL at 09:18

## 2021-01-01 RX ADMIN — ENOXAPARIN SODIUM 40 MG: 40 INJECTION SUBCUTANEOUS at 09:20

## 2021-01-01 RX ADMIN — METHOCARBAMOL 500 MG: 500 TABLET, FILM COATED ORAL at 17:13

## 2021-01-01 RX ADMIN — ACETAMINOPHEN 975 MG: 325 TABLET, FILM COATED ORAL at 08:07

## 2021-01-01 RX ADMIN — CALCIUM CARBONATE (ANTACID) CHEW TAB 500 MG 500 MG: 500 CHEW TAB at 07:57

## 2021-01-01 RX ADMIN — DONEPEZIL HYDROCHLORIDE 10 MG: 10 TABLET ORAL at 22:45

## 2021-01-01 RX ADMIN — METHOCARBAMOL 500 MG: 500 TABLET, FILM COATED ORAL at 16:27

## 2021-01-01 RX ADMIN — ENOXAPARIN SODIUM 40 MG: 40 INJECTION SUBCUTANEOUS at 07:55

## 2021-01-01 RX ADMIN — ACETAMINOPHEN 975 MG: 325 TABLET, FILM COATED ORAL at 08:20

## 2021-01-01 RX ADMIN — Medication 100 MCG: at 09:20

## 2021-01-01 RX ADMIN — ENOXAPARIN SODIUM 40 MG: 40 INJECTION SUBCUTANEOUS at 07:45

## 2021-01-01 RX ADMIN — Medication 100 MCG: at 07:57

## 2021-01-01 RX ADMIN — HYDROCHLOROTHIAZIDE 25 MG: 25 TABLET ORAL at 07:46

## 2021-01-01 RX ADMIN — ONDANSETRON 4 MG: 2 INJECTION INTRAMUSCULAR; INTRAVENOUS at 11:31

## 2021-01-01 RX ADMIN — Medication 2.5 MG: at 05:11

## 2021-01-01 RX ADMIN — POLYETHYLENE GLYCOL 3350 17 G: 17 POWDER, FOR SOLUTION ORAL at 09:02

## 2021-01-01 RX ADMIN — METHOCARBAMOL 500 MG: 500 TABLET, FILM COATED ORAL at 21:08

## 2021-01-01 RX ADMIN — LISINOPRIL 40 MG: 20 TABLET ORAL at 09:19

## 2021-01-01 RX ADMIN — DONEPEZIL HYDROCHLORIDE 10 MG: 10 TABLET ORAL at 21:08

## 2021-01-01 RX ADMIN — POLYETHYLENE GLYCOL 3350 17 G: 17 POWDER, FOR SOLUTION ORAL at 08:18

## 2021-01-01 RX ADMIN — METHOCARBAMOL 500 MG: 500 TABLET, FILM COATED ORAL at 13:08

## 2021-01-01 RX ADMIN — ACETAMINOPHEN 975 MG: 325 TABLET, FILM COATED ORAL at 07:46

## 2021-01-01 RX ADMIN — CALCIUM CARBONATE (ANTACID) CHEW TAB 500 MG 500 MG: 500 CHEW TAB at 08:19

## 2021-01-01 RX ADMIN — METHOCARBAMOL 500 MG: 500 TABLET, FILM COATED ORAL at 08:28

## 2021-01-01 RX ADMIN — METHOCARBAMOL 500 MG: 500 TABLET, FILM COATED ORAL at 08:19

## 2021-01-01 RX ADMIN — CALCIUM CARBONATE (ANTACID) CHEW TAB 500 MG 500 MG: 500 CHEW TAB at 07:55

## 2021-01-01 RX ADMIN — Medication 2.5 MG: at 22:30

## 2021-01-01 RX ADMIN — Medication 2.5 MG: at 00:01

## 2021-01-01 RX ADMIN — ATORVASTATIN CALCIUM 10 MG: 10 TABLET, FILM COATED ORAL at 07:46

## 2021-01-01 RX ADMIN — ATORVASTATIN CALCIUM 10 MG: 10 TABLET, FILM COATED ORAL at 08:10

## 2021-01-01 RX ADMIN — METHOCARBAMOL 500 MG: 500 TABLET, FILM COATED ORAL at 13:06

## 2021-01-01 RX ADMIN — ACETAMINOPHEN 975 MG: 325 TABLET, FILM COATED ORAL at 08:45

## 2021-01-01 RX ADMIN — LISINOPRIL 40 MG: 20 TABLET ORAL at 07:56

## 2021-01-01 RX ADMIN — ACETAMINOPHEN 975 MG: 325 TABLET, FILM COATED ORAL at 13:06

## 2021-01-01 RX ADMIN — ACETAMINOPHEN 975 MG: 325 TABLET, FILM COATED ORAL at 14:43

## 2021-01-01 RX ADMIN — METHOCARBAMOL 500 MG: 500 TABLET, FILM COATED ORAL at 17:44

## 2021-01-01 RX ADMIN — METHOCARBAMOL 500 MG: 500 TABLET, FILM COATED ORAL at 07:55

## 2021-01-01 RX ADMIN — CALCIUM CARBONATE (ANTACID) CHEW TAB 500 MG 500 MG: 500 CHEW TAB at 07:45

## 2021-01-01 RX ADMIN — ACETAMINOPHEN 975 MG: 325 TABLET, FILM COATED ORAL at 21:11

## 2021-01-01 RX ADMIN — LISINOPRIL 40 MG: 20 TABLET ORAL at 11:37

## 2021-01-01 RX ADMIN — ACETAMINOPHEN 975 MG: 325 TABLET, FILM COATED ORAL at 16:59

## 2021-01-01 RX ADMIN — Medication 100 MCG: at 07:45

## 2021-01-01 RX ADMIN — DONEPEZIL HYDROCHLORIDE 10 MG: 10 TABLET ORAL at 23:48

## 2021-01-01 RX ADMIN — ATORVASTATIN CALCIUM 10 MG: 10 TABLET, FILM COATED ORAL at 07:55

## 2021-01-01 RX ADMIN — METHOCARBAMOL 500 MG: 500 TABLET, FILM COATED ORAL at 16:29

## 2021-01-01 RX ADMIN — HYDROCHLOROTHIAZIDE 25 MG: 25 TABLET ORAL at 08:02

## 2021-01-01 RX ADMIN — METHOCARBAMOL 500 MG: 500 TABLET, FILM COATED ORAL at 20:02

## 2021-01-01 RX ADMIN — METHOCARBAMOL 500 MG: 500 TABLET, FILM COATED ORAL at 11:40

## 2021-01-01 RX ADMIN — ATORVASTATIN CALCIUM 10 MG: 10 TABLET, FILM COATED ORAL at 08:45

## 2021-01-01 RX ADMIN — CALCIUM CARBONATE (ANTACID) CHEW TAB 500 MG 500 MG: 500 CHEW TAB at 09:19

## 2021-01-01 RX ADMIN — ACETAMINOPHEN 975 MG: 325 TABLET, FILM COATED ORAL at 20:18

## 2021-01-01 RX ADMIN — METHOCARBAMOL 500 MG: 500 TABLET, FILM COATED ORAL at 20:55

## 2021-01-01 RX ADMIN — METHOCARBAMOL 500 MG: 500 TABLET, FILM COATED ORAL at 13:12

## 2021-01-01 RX ADMIN — POLYETHYLENE GLYCOL 3350 17 G: 17 POWDER, FOR SOLUTION ORAL at 08:26

## 2021-01-01 RX ADMIN — Medication 100 MCG: at 09:02

## 2021-01-01 RX ADMIN — DOCUSATE SODIUM AND SENNOSIDES 2 TABLET: 8.6; 5 TABLET ORAL at 11:40

## 2021-01-01 RX ADMIN — LISINOPRIL 40 MG: 20 TABLET ORAL at 07:55

## 2021-01-01 RX ADMIN — ENOXAPARIN SODIUM 40 MG: 40 INJECTION SUBCUTANEOUS at 08:28

## 2021-01-01 RX ADMIN — LISINOPRIL 40 MG: 20 TABLET ORAL at 09:03

## 2021-01-01 RX ADMIN — HYDROCHLOROTHIAZIDE 25 MG: 25 TABLET ORAL at 08:16

## 2021-01-01 RX ADMIN — LISINOPRIL 40 MG: 20 TABLET ORAL at 08:29

## 2021-01-01 RX ADMIN — ACETAMINOPHEN 975 MG: 325 TABLET, FILM COATED ORAL at 20:02

## 2021-01-01 RX ADMIN — ACETAMINOPHEN 975 MG: 325 TABLET, FILM COATED ORAL at 13:11

## 2021-01-01 RX ADMIN — DONEPEZIL HYDROCHLORIDE 10 MG: 10 TABLET ORAL at 21:11

## 2021-01-01 RX ADMIN — LISINOPRIL 40 MG: 20 TABLET ORAL at 08:09

## 2021-01-01 RX ADMIN — POLYETHYLENE GLYCOL 3350 17 G: 17 POWDER, FOR SOLUTION ORAL at 07:45

## 2021-01-01 RX ADMIN — ATORVASTATIN CALCIUM 10 MG: 10 TABLET, FILM COATED ORAL at 07:56

## 2021-01-01 RX ADMIN — Medication 100 MCG: at 08:20

## 2021-01-01 RX ADMIN — ENOXAPARIN SODIUM 40 MG: 40 INJECTION SUBCUTANEOUS at 08:24

## 2021-01-01 RX ADMIN — METHOCARBAMOL 500 MG: 500 TABLET, FILM COATED ORAL at 08:44

## 2021-01-01 RX ADMIN — ENOXAPARIN SODIUM 40 MG: 40 INJECTION SUBCUTANEOUS at 09:02

## 2021-01-01 RX ADMIN — METHOCARBAMOL 500 MG: 500 TABLET, FILM COATED ORAL at 21:17

## 2021-01-01 RX ADMIN — DONEPEZIL HYDROCHLORIDE 10 MG: 10 TABLET ORAL at 22:07

## 2021-01-01 RX ADMIN — METHOCARBAMOL 500 MG: 500 TABLET, FILM COATED ORAL at 13:02

## 2021-01-01 RX ADMIN — METHOCARBAMOL 500 MG: 500 TABLET, FILM COATED ORAL at 09:19

## 2021-01-01 RX ADMIN — SODIUM CHLORIDE: 9 INJECTION, SOLUTION INTRAVENOUS at 10:25

## 2021-01-01 RX ADMIN — ACETAMINOPHEN 975 MG: 325 TABLET, FILM COATED ORAL at 21:17

## 2021-01-01 RX ADMIN — ACETAMINOPHEN 975 MG: 325 TABLET, FILM COATED ORAL at 07:55

## 2021-01-01 RX ADMIN — METHOCARBAMOL 500 MG: 500 TABLET, FILM COATED ORAL at 17:07

## 2021-01-01 RX ADMIN — ATORVASTATIN CALCIUM 10 MG: 10 TABLET, FILM COATED ORAL at 08:20

## 2021-01-01 RX ADMIN — METHOCARBAMOL 500 MG: 500 TABLET, FILM COATED ORAL at 08:09

## 2021-01-01 RX ADMIN — CHOLECALCIFEROL CAP 1.25 MG (50000 UNIT) 50000 UNITS: 1.25 CAP at 08:10

## 2021-01-01 RX ADMIN — HYDROCHLOROTHIAZIDE 25 MG: 25 TABLET ORAL at 08:28

## 2021-01-01 RX ADMIN — DONEPEZIL HYDROCHLORIDE 10 MG: 10 TABLET ORAL at 21:58

## 2021-01-01 RX ADMIN — POLYETHYLENE GLYCOL 3350 17 G: 17 POWDER, FOR SOLUTION ORAL at 08:46

## 2021-01-01 RX ADMIN — Medication 2.5 MG: at 00:51

## 2021-01-01 RX ADMIN — METHOCARBAMOL 500 MG: 500 TABLET, FILM COATED ORAL at 15:38

## 2021-01-01 RX ADMIN — CALCIUM CARBONATE (ANTACID) CHEW TAB 500 MG 500 MG: 500 CHEW TAB at 09:02

## 2021-01-01 RX ADMIN — DOCUSATE SODIUM AND SENNOSIDES 2 TABLET: 8.6; 5 TABLET ORAL at 21:11

## 2021-01-01 RX ADMIN — ACETAMINOPHEN 975 MG: 325 TABLET, FILM COATED ORAL at 13:08

## 2021-01-01 RX ADMIN — SODIUM CHLORIDE: 9 INJECTION, SOLUTION INTRAVENOUS at 00:17

## 2021-01-01 RX ADMIN — ATORVASTATIN CALCIUM 10 MG: 10 TABLET, FILM COATED ORAL at 09:02

## 2021-01-01 RX ADMIN — ENOXAPARIN SODIUM 40 MG: 40 INJECTION SUBCUTANEOUS at 08:09

## 2021-01-01 RX ADMIN — METHOCARBAMOL 500 MG: 500 TABLET, FILM COATED ORAL at 09:03

## 2021-01-01 RX ADMIN — ACETAMINOPHEN 975 MG: 325 TABLET, FILM COATED ORAL at 08:18

## 2021-01-01 RX ADMIN — METHOCARBAMOL 500 MG: 500 TABLET, FILM COATED ORAL at 17:00

## 2021-01-01 RX ADMIN — POLYETHYLENE GLYCOL 3350 17 G: 17 POWDER, FOR SOLUTION ORAL at 21:11

## 2021-01-01 RX ADMIN — Medication 100 MCG: at 08:43

## 2021-01-01 RX ADMIN — LISINOPRIL 40 MG: 20 TABLET ORAL at 07:46

## 2021-01-01 RX ADMIN — CALCIUM CARBONATE (ANTACID) CHEW TAB 500 MG 500 MG: 500 CHEW TAB at 08:29

## 2021-01-01 RX ADMIN — METHOCARBAMOL 500 MG: 500 TABLET, FILM COATED ORAL at 19:47

## 2021-01-01 RX ADMIN — METHOCARBAMOL 500 MG: 500 TABLET, FILM COATED ORAL at 08:20

## 2021-01-01 RX ADMIN — DONEPEZIL HYDROCHLORIDE 10 MG: 10 TABLET ORAL at 21:17

## 2021-01-01 RX ADMIN — ACETAMINOPHEN 975 MG: 325 TABLET, FILM COATED ORAL at 20:55

## 2021-01-01 RX ADMIN — POLYETHYLENE GLYCOL 3350 17 G: 17 POWDER, FOR SOLUTION ORAL at 08:06

## 2021-01-01 RX ADMIN — LISINOPRIL 40 MG: 20 TABLET ORAL at 08:44

## 2021-01-01 RX ADMIN — Medication 2.5 MG: at 04:16

## 2021-01-01 ASSESSMENT — ENCOUNTER SYMPTOMS
DEPRESSION: 0
NERVOUS/ANXIOUS: 1
DYSURIA: 0
HEMATURIA: 0
EYE IRRITATION: 1
PANIC: 0
EYE WATERING: 1
DECREASED CONCENTRATION: 0
DIFFICULTY URINATING: 1
FLANK PAIN: 0
INSOMNIA: 0
DOUBLE VISION: 0
EYE PAIN: 0
EYE REDNESS: 0

## 2021-01-01 ASSESSMENT — ACTIVITIES OF DAILY LIVING (ADL): DEPENDENT_IADLS:: CLEANING;COOKING;LAUNDRY;SHOPPING;MEAL PREPARATION;MEDICATION MANAGEMENT;TRANSPORTATION

## 2021-02-09 ENCOUNTER — ASSISTED LIVING VISIT (OUTPATIENT)
Dept: GERIATRICS | Facility: CLINIC | Age: 86
End: 2021-02-09
Payer: COMMERCIAL

## 2021-02-09 DIAGNOSIS — G30.1 LATE ONSET ALZHEIMER'S DISEASE WITHOUT BEHAVIORAL DISTURBANCE (H): ICD-10-CM

## 2021-02-09 DIAGNOSIS — F02.80 LATE ONSET ALZHEIMER'S DISEASE WITHOUT BEHAVIORAL DISTURBANCE (H): ICD-10-CM

## 2021-02-09 DIAGNOSIS — E55.9 VITAMIN D DEFICIENCY: ICD-10-CM

## 2021-02-09 DIAGNOSIS — H35.30 MACULAR DEGENERATION (SENILE) OF RETINA: ICD-10-CM

## 2021-02-09 DIAGNOSIS — M81.0 AGE-RELATED OSTEOPOROSIS WITHOUT CURRENT PATHOLOGICAL FRACTURE: ICD-10-CM

## 2021-02-09 DIAGNOSIS — I45.6 WOLFF-PARKINSON-WHITE (WPW) PATTERN: ICD-10-CM

## 2021-02-09 DIAGNOSIS — I10 ESSENTIAL HYPERTENSION: Primary | ICD-10-CM

## 2021-02-09 DIAGNOSIS — E78.5 HYPERLIPIDEMIA LDL GOAL <130: ICD-10-CM

## 2021-02-09 PROCEDURE — 99207 PR CDG-MDM COMPONENT: MEETS MODERATE - UP CODED: CPT | Performed by: NURSE PRACTITIONER

## 2021-02-09 SDOH — HEALTH STABILITY: MENTAL HEALTH: HOW OFTEN DO YOU HAVE 6 OR MORE DRINKS ON ONE OCCASION?: NOT ASKED

## 2021-02-09 SDOH — HEALTH STABILITY: MENTAL HEALTH: HOW MANY STANDARD DRINKS CONTAINING ALCOHOL DO YOU HAVE ON A TYPICAL DAY?: NOT ASKED

## 2021-02-09 SDOH — HEALTH STABILITY: MENTAL HEALTH: HOW OFTEN DO YOU HAVE A DRINK CONTAINING ALCOHOL?: NOT ASKED

## 2021-02-09 NOTE — LETTER
2/9/2021        RE: Debi Morton  [address] [address]        [facility]  [facility] Medical Record Number:  [id number]  Place of Service where encounter took place:  [facility] ([facility]) [[id number]]  Chief Complaint   Patient presents with     RECHECK       HPI:    Debi Morton  is a 86 year old (11/10/1934), who is being seen today for an episodic care visit.  HPI information obtained from: facility chart records, facility staff, patient report and [facility] chart review.   She moved to this facility from  [place] in 9/2020 to be closer to her family and we were asked to assume her primary care in 11/2020. History of cognitive decline and MRI brain 1/27/2020 showed moderate volume loss with progressive atrophy and progressive chronic small vessel disease compared to 2012. She was seen by Dr Mirza at the [facility] 9/11/2020 who felt she likely has Alzheimer's type dementia.   Neuropsychiatric testing was done 9/23/2020 and revealed deficits in attention, learning, memory and language. She had  MMSE 19/30 and CPT 4.5/5.6. Aricept  was started at follow up appt with Dr Mirza 10/7/2020.     Today's concern is:  She reports feeling well with good appetite. Denies pain of any type. Denies feeling ill. Ambulatory with no device. She lives in the AL and staff administers meds.   Fair historian, pleasant and talkative. Has reminder notes for herself posted in the kitchen.       Past Medical and Surgical History reviewed in Epic today.    MEDICATIONS:    Current Outpatient Medications   Medication Sig Dispense Refill     alendronate (FOSAMAX) 70 MG tablet Take 1 tablet (70 mg) by mouth every 7 days 4 tablet 11     atorvastatin (LIPITOR) 10 MG tablet Take 1 tablet (10 mg) by mouth daily 30 tablet 11     calcium carbonate (TUMS) 500 MG chewable tablet Take 1 tablet (500 mg) by mouth daily 30  tablet 11     cyanocobalamin (VITAMIN B-12) 100 MCG tablet Take 1 tablet (100 mcg) by mouth daily 30 tablet 11     donepezil (ARICEPT) 10 MG tablet Take 1 tablet (10 mg) by mouth daily 30 tablet 11     hydrochlorothiazide (HYDRODIURIL) 25 MG tablet Take 1 tablet (25 mg) by mouth every other day 15 tablet 11     Multiple Vitamins-Minerals (PRESERVISION AREDS) TABS Take 1 tablet by mouth daily 30 tablet 11     quinapril (ACCUPRIL) 40 MG tablet Take 1 tablet (40 mg) by mouth daily 30 tablet 11     vitamin D2 (ERGOCALCIFEROL) 40262 units (1250 mcg) capsule Take 1 capsule (50,000 Units) by mouth once a week 4 capsule 11         REVIEW OF SYSTEMS:  4 point ROS including Respiratory, CV, GI and , other than that noted in the HPI,  is negative    Objective:  BP (!) 144/66   Pulse 73   Temp 97.3  F (36.3  C)   Resp 16   SpO2 97%   Exam:  GENERAL APPEARANCE:  Alert, in no distress, thin  ENT:  [place], oropharynx clear  EYES:  EOM normal, conjunctiva and lids normal  NECK:  No adenopathy,masses or thyromegaly  RESP:  respiratory effort and palpation of chest normal, lungs clear to auscultation , no respiratory distress  CV:  Palpation and auscultation of heart done , regular rate and rhythm, no murmur, rub, or gallop, no edema, +2 pedal pulses  ABDOMEN:  soft, non-tender, no distension, no masses  M/S:   gait steady. YANCEY with good strength  SKIN:  no rashes or open areas  PSYCH:  oriented to self, place, insight and judgement impaired, memory impaired , affect and mood normal    Labs:   Recent labs in [facility] reviewed by me today.     ASSESSMENT / PLAN:  (I10) Essential hypertension  (primary encounter diagnosis)  Comment: controlled   Plan: continue HCTZ, quinapril. BMP. Monitor VS and adjusts meds as indicated. Avoid hypotension due to advanced age and fall risk.     (G30.1,  F02.80) Late onset Alzheimer's disease without behavioral disturbance (H)  Comment: pleasantly confused. Lives in the AL apt and is independent with  cares. Ambulates without device.   Plan: continue donepezil. Continue med administration per staff.     (M81.0) Age-related osteoporosis without current pathological fracture  (E55.9) Vitamin D deficiency  Comment: chronic. No falls reported   Plan: continue fosamax, calcium/vitamin D    (H35.30) Macular degeneration (senile) of retina  Comment: gradual decline in vision, per patient report   Plan: Ophthalmology follow up per usual routine. Continue Preservision.     (E78.5) Hyperlipidemia LDL goal <130  Comment: no recent labs.   Plan: continue atorvastatin. May considering discontinuing-will discuss with family at next visit.     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 No current issues   Plan: monitor       Electronically signed by:  CESAR Best CNP                 Sincerely,        CESAR Best CNP

## 2021-02-09 NOTE — PROGRESS NOTES
[facility]  [facility] Record Number:  [id number]  Place of Service where encounter took place:  [facility] ([facility]) [[id number]]  Chief Complaint   Patient presents with     RECHECK       HPI:    Debi Morton  is a 86 year old (11/10/1934), who is being seen today for an episodic care visit.  HPI information obtained from: facility chart records, facility staff, patient report and [facility] chart review.   She moved to this facility from  [place] in 9/2020 to be closer to her family and we were asked to assume her primary care in 11/2020. History of cognitive decline and MRI brain 1/27/2020 showed moderate volume loss with progressive atrophy and progressive chronic small vessel disease compared to 2012. She was seen by Dr Mirza at the [facility] 9/11/2020 who felt she likely has Alzheimer's type dementia.   Neuropsychiatric testing was done 9/23/2020 and revealed deficits in attention, learning, memory and language. She had  MMSE 19/30 and CPT 4.5/5.6. Aricept  was started at follow up appt with Dr Mirza 10/7/2020.     Today's concern is:  She reports feeling well with good appetite. Denies pain of any type. Denies feeling ill. Ambulatory with no device. She lives in the AL and staff administers meds.   Fair historian, pleasant and talkative. Has reminder notes for herself posted in the kitchen.       Past Medical and Surgical History reviewed in Epic today.    MEDICATIONS:    Current Outpatient Medications   Medication Sig Dispense Refill     alendronate (FOSAMAX) 70 MG tablet Take 1 tablet (70 mg) by mouth every 7 days 4 tablet 11     atorvastatin (LIPITOR) 10 MG tablet Take 1 tablet (10 mg) by mouth daily 30 tablet 11     calcium carbonate (TUMS) 500 MG chewable tablet Take 1 tablet (500 mg) by mouth daily 30 tablet 11     cyanocobalamin (VITAMIN B-12) 100 MCG tablet Take 1 tablet (100 mcg) by mouth daily 30 tablet 11     donepezil  (ARICEPT) 10 MG tablet Take 1 tablet (10 mg) by mouth daily 30 tablet 11     hydrochlorothiazide (HYDRODIURIL) 25 MG tablet Take 1 tablet (25 mg) by mouth every other day 15 tablet 11     Multiple Vitamins-Minerals (PRESERVISION AREDS) TABS Take 1 tablet by mouth daily 30 tablet 11     quinapril (ACCUPRIL) 40 MG tablet Take 1 tablet (40 mg) by mouth daily 30 tablet 11     vitamin D2 (ERGOCALCIFEROL) 00413 units (1250 mcg) capsule Take 1 capsule (50,000 Units) by mouth once a week 4 capsule 11         REVIEW OF SYSTEMS:  4 point ROS including Respiratory, CV, GI and , other than that noted in the HPI,  is negative    Objective:  BP (!) 144/66   Pulse 73   Temp 97.3  F (36.3  C)   Resp 16   SpO2 97%   Exam:  GENERAL APPEARANCE:  Alert, in no distress, thin  ENT:  [place], oropharynx clear  EYES:  EOM normal, conjunctiva and lids normal  NECK:  No adenopathy,masses or thyromegaly  RESP:  respiratory effort and palpation of chest normal, lungs clear to auscultation , no respiratory distress  CV:  Palpation and auscultation of heart done , regular rate and rhythm, no murmur, rub, or gallop, no edema, +2 pedal pulses  ABDOMEN:  soft, non-tender, no distension, no masses  M/S:   gait steady. YANCEY with good strength  SKIN:  no rashes or open areas  PSYCH:  oriented to self, place, insight and judgement impaired, memory impaired , affect and mood normal    Labs:   Recent labs in [facility] reviewed by me today.     ASSESSMENT / PLAN:  (I10) Essential hypertension  (primary encounter diagnosis)  Comment: controlled   Plan: continue HCTZ, quinapril. BMP. Monitor VS and adjusts meds as indicated. Avoid hypotension due to advanced age and fall risk.     (G30.1,  F02.80) Late onset Alzheimer's disease without behavioral disturbance (H)  Comment: pleasantly confused. Lives in the AL apt and is independent with cares. Ambulates without device.   Plan: continue donepezil. Continue med administration per staff.     (M81.0) Age-related  osteoporosis without current pathological fracture  (E55.9) Vitamin D deficiency  Comment: chronic. No falls reported   Plan: continue fosamax, calcium/vitamin D    (H35.30) Macular degeneration (senile) of retina  Comment: gradual decline in vision, per patient report   Plan: Ophthalmology follow up per usual routine. Continue Preservision.     (E78.5) Hyperlipidemia LDL goal <130  Comment: no recent labs.   Plan: continue atorvastatin. May considering discontinuing-will discuss with family at next visit.     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 No current issues   Plan: monitor       Electronically signed by:  CESAR Best CNP

## 2021-02-14 VITALS
RESPIRATION RATE: 16 BRPM | OXYGEN SATURATION: 97 % | DIASTOLIC BLOOD PRESSURE: 66 MMHG | HEART RATE: 73 BPM | TEMPERATURE: 97.3 F | SYSTOLIC BLOOD PRESSURE: 144 MMHG

## 2021-03-25 ENCOUNTER — RECORDS - HEALTHEAST (OUTPATIENT)
Dept: LAB | Facility: CLINIC | Age: 86
End: 2021-03-25

## 2021-03-25 LAB
SARS-COV-2 PCR COMMENT: NORMAL
SARS-COV-2 RNA SPEC QL NAA+PROBE: NEGATIVE
SARS-COV-2 VIRUS SPECIMEN SOURCE: NORMAL

## 2021-04-21 VITALS
HEART RATE: 72 BPM | DIASTOLIC BLOOD PRESSURE: 59 MMHG | RESPIRATION RATE: 16 BRPM | TEMPERATURE: 97 F | WEIGHT: 120 LBS | SYSTOLIC BLOOD PRESSURE: 140 MMHG | BODY MASS INDEX: 20.6 KG/M2

## 2021-04-21 NOTE — PROGRESS NOTES
[place] [facility]  [place] [facility] Record Number:  [id number]  Place of Service where encounter took place:  [facility] ([facility]) [[id number]]  Chief Complaint   Patient presents with     RECHECK       HPI:    Debi Morton  is a 86 year old (11/10/1934), who is being seen today for an episodic care visit.  HPI information obtained from: facility chart records, facility staff, patient report, [facility] chart review and family/first contact daughter Marisol Morton  report.   She moved to this facility from  [place] in 9/2020 to be closer to her family and we were asked to assume her primary care in 11/2020. History of cognitive decline and MRI brain 1/27/2020 showed moderate volume loss with progressive atrophy and progressive chronic small vessel disease compared to 2012. She was seen by Dr Mirza at the [facility] 9/11/2020 who felt she likely has Alzheimer's type dementia.   Neuropsychiatric testing was done 9/23/2020 and revealed deficits in attention, learning, memory and language. She had  MMSE 19/30 and CPT 4.5/5.6. Aricept  was started at follow up appt with Dr Mirza 10/7/2020.   Other medical issues include HTN, macular degeneration, osteoporosis.     Today's concern is:     Late onset Alzheimer's disease without behavioral disturbance (H)-pleasant and talkative. Conversation is repetitive. Fair historian. Reports feeling well. Denies pain of any type. Good appetite. Up and about in her apt. No assistive device. Denies falls. Independent with cares.   Essential hypertension-BPs: 140/59, 145/86, 141/66  HR: 69-73  Age-related osteoporosis without current pathological fracture  Vitamin D deficiency  Macular degeneration (senile) of retina  Hyperlipidemia    Past Medical and Surgical History reviewed in Epic today.    MEDICATIONS:    Current Outpatient Medications   Medication Sig Dispense Refill     alendronate (FOSAMAX) 70 MG tablet Take 1 tablet  (70 mg) by mouth every 7 days 4 tablet 11     atorvastatin (LIPITOR) 10 MG tablet Take 1 tablet (10 mg) by mouth daily 30 tablet 11     calcium carbonate (TUMS) 500 MG chewable tablet Take 1 tablet (500 mg) by mouth daily 30 tablet 11     cyanocobalamin (VITAMIN B-12) 100 MCG tablet Take 1 tablet (100 mcg) by mouth daily 30 tablet 11     donepezil (ARICEPT) 10 MG tablet Take 1 tablet (10 mg) by mouth daily 30 tablet 11     hydrochlorothiazide (HYDRODIURIL) 25 MG tablet Take 1 tablet (25 mg) by mouth every other day 15 tablet 11     Multiple Vitamins-Minerals (PRESERVISION AREDS) TABS Take 1 tablet by mouth daily 30 tablet 11     quinapril (ACCUPRIL) 40 MG tablet Take 1 tablet (40 mg) by mouth daily 30 tablet 11     vitamin D2 (ERGOCALCIFEROL) 02437 units (1250 mcg) capsule Take 1 capsule (50,000 Units) by mouth once a week 4 capsule 11         REVIEW OF SYSTEMS:  Limited secondary to cognitive impairment. Positives as noted above     Objective:  BP (!) 140/59   Pulse 72   Temp 97  F (36.1  C)   Resp 16   Wt 54.4 kg (120 lb)   BMI 20.60 kg/m    Exam:  GENERAL APPEARANCE:  Alert, in no distress, thin. Mild tremor both hands  ENT:  [place], oropharynx clear  EYES:  EOM normal, conjunctiva and lids normal  NECK:  No adenopathy,masses or thyromegaly  RESP:  lungs clear to auscultation , no respiratory distress  CV:  regular rate and rhythm, no murmur,  no edema, +2 pedal pulses  ABDOMEN:  soft, non-tender, no distension, no masses  M/S:   gait steady. YANCEY with good strength  SKIN:  no rashes or open areas  PSYCH:  oriented to self, place, insight and judgement impaired, memory impaired, affect and mood normal    Labs:   Recent labs in [facility] reviewed by me today.     ASSESSMENT / PLAN:  (G30.1,  F02.80) Late onset Alzheimer's disease without behavioral disturbance (H)  (primary encounter diagnosis)  Comment: moderate deficits. [place] in [place] with support from family and staff.   Plan: continue donepezil. Med  administration per staff.   Discussed with daughter who agrees with plan of care. Additionally, she has questions re: availability of onsite Podiatry and frequency of COVID testing done at the facility. Her concerns were discussed with facility nurse to follow up.     (I10) Essential hypertension  Comment: controlled   Plan: continue HCTZ, quinapril. BMP. Avoid hypotension due to advanced age and fall risk     (M81.0) Age-related osteoporosis without current pathological fracture  (E55.9) Vitamin D deficiency  Comment: remains ambulatory, no falls reported   Plan: continue fosamax and calcium, vitamin D    (H35.30) Macular degeneration (senile) of retina  Comment: unclear if wet or dry.   Plan: continue Preservision. Will need Ophthalmology follow up when able.     (E78.5) Hyperlipidemia LDL goal <130  Comment: discussed discontinuing statin with daughter, who would like to have a lab work done first   Unclear why she is taking vitamin B12  Plan: CBC, BMP, fasting lipid panel, vitamin B12 level. Continue statin     (Z71.89) ACP (advance care planning)  Comment: discussed at length with daughter who requests DNR/DNI   Plan: orders updated and POLST completed       Total time with an established patient visit in the assisted livin minutes including discussions about the POC and care coordination with the patient, daughter and facility staff. Greater than 50% of total time spent with counseling and coordinating care due to: coordinating the following with facility staff: lab work, code status and daughter's questions re: services and COVID testing. Counseling daughter re: medications, rationale for follow up labs, plan of care and advanced directive.       Electronically signed by:  CESAR Best CNP

## 2021-04-22 ENCOUNTER — ASSISTED LIVING VISIT (OUTPATIENT)
Dept: GERIATRICS | Facility: CLINIC | Age: 86
End: 2021-04-22
Payer: COMMERCIAL

## 2021-04-22 DIAGNOSIS — H35.30 MACULAR DEGENERATION (SENILE) OF RETINA: ICD-10-CM

## 2021-04-22 DIAGNOSIS — E78.5 HYPERLIPIDEMIA LDL GOAL <130: ICD-10-CM

## 2021-04-22 DIAGNOSIS — Z71.89 ACP (ADVANCE CARE PLANNING): ICD-10-CM

## 2021-04-22 DIAGNOSIS — I10 ESSENTIAL HYPERTENSION: ICD-10-CM

## 2021-04-22 DIAGNOSIS — M81.0 AGE-RELATED OSTEOPOROSIS WITHOUT CURRENT PATHOLOGICAL FRACTURE: ICD-10-CM

## 2021-04-22 DIAGNOSIS — F02.80 LATE ONSET ALZHEIMER'S DISEASE WITHOUT BEHAVIORAL DISTURBANCE (H): Primary | ICD-10-CM

## 2021-04-22 DIAGNOSIS — G30.1 LATE ONSET ALZHEIMER'S DISEASE WITHOUT BEHAVIORAL DISTURBANCE (H): Primary | ICD-10-CM

## 2021-04-22 DIAGNOSIS — E55.9 VITAMIN D DEFICIENCY: ICD-10-CM

## 2021-04-22 PROCEDURE — 99207 PR CDG-CODE INCORRECT PER BILLING BASED ON TIME: CPT | Performed by: NURSE PRACTITIONER

## 2021-04-22 NOTE — LETTER
4/22/2021        RE: Debi Morton  [address]  [address]  [facility] [address]        [place] [facility]  [place] [facility] Record Number:  [id number]  Place of Service where encounter took place:  [facility] ([facility]) [[id number]]  Chief Complaint   Patient presents with     RECHECK       HPI:    Debi Morton  is a 86 year old (11/10/1934), who is being seen today for an episodic care visit.  HPI information obtained from: facility chart records, facility staff, patient report, [facility] chart review and family/first contact daughter Marisol Morton  report.   She moved to this facility from  [place] in 9/2020 to be closer to her family and we were asked to assume her primary care in 11/2020. History of cognitive decline and MRI brain 1/27/2020 showed moderate volume loss with progressive atrophy and progressive chronic small vessel disease compared to 2012. She was seen by Dr Mirza at the [facility] 9/11/2020 who felt she likely has Alzheimer's type dementia.   Neuropsychiatric testing was done 9/23/2020 and revealed deficits in attention, learning, memory and language. She had  MMSE 19/30 and CPT 4.5/5.6. Aricept  was started at follow up appt with Dr Mirza 10/7/2020.   Other medical issues include HTN, macular degeneration, osteoporosis.     Today's concern is:     Late onset Alzheimer's disease without behavioral disturbance (H)-pleasant and talkative. Conversation is repetitive. Fair historian. Reports feeling well. Denies pain of any type. Good appetite. Up and about in her apt. No assistive device. Denies falls. Independent with cares.   Essential hypertension-BPs: 140/59, 145/86, 141/66  HR: 69-73  Age-related osteoporosis without current pathological fracture  Vitamin D deficiency  Macular degeneration (senile) of retina  Hyperlipidemia    Past Medical and Surgical History reviewed in Epic  today.    MEDICATIONS:    Current Outpatient Medications   Medication Sig Dispense Refill     alendronate (FOSAMAX) 70 MG tablet Take 1 tablet (70 mg) by mouth every 7 days 4 tablet 11     atorvastatin (LIPITOR) 10 MG tablet Take 1 tablet (10 mg) by mouth daily 30 tablet 11     calcium carbonate (TUMS) 500 MG chewable tablet Take 1 tablet (500 mg) by mouth daily 30 tablet 11     cyanocobalamin (VITAMIN B-12) 100 MCG tablet Take 1 tablet (100 mcg) by mouth daily 30 tablet 11     donepezil (ARICEPT) 10 MG tablet Take 1 tablet (10 mg) by mouth daily 30 tablet 11     hydrochlorothiazide (HYDRODIURIL) 25 MG tablet Take 1 tablet (25 mg) by mouth every other day 15 tablet 11     Multiple Vitamins-Minerals (PRESERVISION AREDS) TABS Take 1 tablet by mouth daily 30 tablet 11     quinapril (ACCUPRIL) 40 MG tablet Take 1 tablet (40 mg) by mouth daily 30 tablet 11     vitamin D2 (ERGOCALCIFEROL) 20758 units (1250 mcg) capsule Take 1 capsule (50,000 Units) by mouth once a week 4 capsule 11         REVIEW OF SYSTEMS:  Limited secondary to cognitive impairment. Positives as noted above     Objective:  BP (!) 140/59   Pulse 72   Temp 97  F (36.1  C)   Resp 16   Wt 54.4 kg (120 lb)   BMI 20.60 kg/m    Exam:  GENERAL APPEARANCE:  Alert, in no distress, thin. Mild tremor both hands  ENT:  [place], oropharynx clear  EYES:  EOM normal, conjunctiva and lids normal  NECK:  No adenopathy,masses or thyromegaly  RESP:  lungs clear to auscultation , no respiratory distress  CV:  regular rate and rhythm, no murmur,  no edema, +2 pedal pulses  ABDOMEN:  soft, non-tender, no distension, no masses  M/S:   gait steady. YANCEY with good strength  SKIN:  no rashes or open areas  PSYCH:  oriented to self, place, insight and judgement impaired, memory impaired, affect and mood normal    Labs:   Recent labs in [facility] reviewed by me today.     ASSESSMENT / PLAN:  (G30.1,  F02.80) Late onset Alzheimer's disease without behavioral disturbance (H)  (primary  encounter diagnosis)  Comment: moderate deficits. [place] support from family and staff.   Plan: continue donepezil. Med administration per staff.   Discussed with daughter who agrees with plan of care. Additionally, she has questions re: availability of onsite Podiatry and frequency of COVID testing done at the facility. Her concerns were discussed with facility nurse to follow up.     (I10) Essential hypertension  Comment: controlled   Plan: continue HCTZ, quinapril. BMP. Avoid hypotension due to advanced age and fall risk     (M81.0) Age-related osteoporosis without current pathological fracture  (E55.9) Vitamin D deficiency  Comment: remains ambulatory, no falls reported   Plan: continue fosamax and calcium, vitamin D    (H35.30) Macular degeneration (senile) of retina  Comment: unclear if wet or dry.   Plan: continue Preservision. Will need Ophthalmology follow up when able.     (E78.5) Hyperlipidemia LDL goal <130  Comment: discussed discontinuing statin with daughter, who would like to have a lab work done first   Unclear why she is taking vitamin B12  Plan: CBC, BMP, fasting lipid panel, vitamin B12 level. Continue statin     (Z71.89) ACP (advance care planning)  Comment: discussed at length with daughter who requests DNR/DNI   Plan: orders updated and POLST completed       Total time with an established patient visit in the assisted livin minutes including discussions about the POC and care coordination with the patient, daughter and facility staff. Greater than 50% of total time spent with counseling and coordinating care due to: coordinating the following with facility staff: lab work, code status and daughter's questions re: services and COVID testing. Counseling daughter re: medications, rationale for follow up labs, plan of care and advanced directive.       Electronically signed by:  CESAR Best CNP                 Sincerely,        CESAR Best CNP

## 2021-04-28 ENCOUNTER — RECORDS - HEALTHEAST (OUTPATIENT)
Dept: LAB | Facility: CLINIC | Age: 86
End: 2021-04-28

## 2021-04-29 LAB
ANION GAP SERPL CALCULATED.3IONS-SCNC: 9 MMOL/L (ref 5–18)
BASOPHILS # BLD AUTO: 0.1 THOU/UL (ref 0–0.2)
BASOPHILS NFR BLD AUTO: 1 % (ref 0–2)
BUN SERPL-MCNC: 29 MG/DL (ref 8–28)
CALCIUM SERPL-MCNC: 9.6 MG/DL (ref 8.5–10.5)
CHLORIDE BLD-SCNC: 102 MMOL/L (ref 98–107)
CHOLEST SERPL-MCNC: 202 MG/DL
CO2 SERPL-SCNC: 28 MMOL/L (ref 22–31)
CREAT SERPL-MCNC: 0.78 MG/DL (ref 0.6–1.1)
EOSINOPHIL # BLD AUTO: 0.1 THOU/UL (ref 0–0.4)
EOSINOPHIL NFR BLD AUTO: 1 % (ref 0–6)
ERYTHROCYTE [DISTWIDTH] IN BLOOD BY AUTOMATED COUNT: 14.1 % (ref 11–14.5)
FASTING STATUS PATIENT QL REPORTED: ABNORMAL
GFR SERPL CREATININE-BSD FRML MDRD: >60 ML/MIN/1.73M2
GLUCOSE BLD-MCNC: 91 MG/DL (ref 70–125)
HCT VFR BLD AUTO: 38.1 % (ref 35–47)
HDLC SERPL-MCNC: 64 MG/DL
HGB BLD-MCNC: 12.3 G/DL (ref 12–16)
IMM GRANULOCYTES # BLD: 0.1 THOU/UL
IMM GRANULOCYTES NFR BLD: 1 %
LDLC SERPL CALC-MCNC: 128 MG/DL
LYMPHOCYTES # BLD AUTO: 1.2 THOU/UL (ref 0.8–4.4)
LYMPHOCYTES NFR BLD AUTO: 11 % (ref 20–40)
MCH RBC QN AUTO: 30.3 PG (ref 27–34)
MCHC RBC AUTO-ENTMCNC: 32.3 G/DL (ref 32–36)
MCV RBC AUTO: 94 FL (ref 80–100)
MONOCYTES # BLD AUTO: 1 THOU/UL (ref 0–0.9)
MONOCYTES NFR BLD AUTO: 10 % (ref 2–10)
NEUTROPHILS # BLD AUTO: 8.1 THOU/UL (ref 2–7.7)
NEUTROPHILS NFR BLD AUTO: 77 % (ref 50–70)
PLATELET # BLD AUTO: 765 THOU/UL (ref 140–440)
PMV BLD AUTO: 9.4 FL (ref 8.5–12.5)
POTASSIUM BLD-SCNC: 4 MMOL/L (ref 3.5–5)
RBC # BLD AUTO: 4.06 MILL/UL (ref 3.8–5.4)
SODIUM SERPL-SCNC: 139 MMOL/L (ref 136–145)
TRIGL SERPL-MCNC: 52 MG/DL
VIT B12 SERPL-MCNC: 1002 PG/ML (ref 213–816)
WBC: 10.6 THOU/UL (ref 4–11)

## 2021-05-22 ENCOUNTER — HEALTH MAINTENANCE LETTER (OUTPATIENT)
Age: 86
End: 2021-05-22

## 2021-06-25 ENCOUNTER — APPOINTMENT (OUTPATIENT)
Dept: GENERAL RADIOLOGY | Facility: CLINIC | Age: 86
DRG: 482 | End: 2021-06-25
Attending: EMERGENCY MEDICINE
Payer: COMMERCIAL

## 2021-06-25 ENCOUNTER — ANESTHESIA EVENT (OUTPATIENT)
Dept: ULTRASOUND IMAGING | Facility: CLINIC | Age: 86
DRG: 482 | End: 2021-06-25
Payer: COMMERCIAL

## 2021-06-25 ENCOUNTER — ANESTHESIA (OUTPATIENT)
Dept: ULTRASOUND IMAGING | Facility: CLINIC | Age: 86
DRG: 482 | End: 2021-06-25
Payer: COMMERCIAL

## 2021-06-25 ENCOUNTER — APPOINTMENT (OUTPATIENT)
Dept: GENERAL RADIOLOGY | Facility: CLINIC | Age: 86
DRG: 482 | End: 2021-06-25
Attending: PHYSICIAN ASSISTANT
Payer: COMMERCIAL

## 2021-06-25 ENCOUNTER — ANCILLARY PROCEDURE (OUTPATIENT)
Dept: ULTRASOUND IMAGING | Facility: CLINIC | Age: 86
End: 2021-06-25
Payer: COMMERCIAL

## 2021-06-25 ENCOUNTER — HOSPITAL ENCOUNTER (INPATIENT)
Facility: CLINIC | Age: 86
LOS: 13 days | Discharge: NURSING, REHAB OR LONG-TERM CARE | DRG: 482 | End: 2021-07-08
Attending: EMERGENCY MEDICINE | Admitting: SURGERY
Payer: COMMERCIAL

## 2021-06-25 DIAGNOSIS — Z11.52 ENCOUNTER FOR SCREENING LABORATORY TESTING FOR SEVERE ACUTE RESPIRATORY SYNDROME CORONAVIRUS 2 (SARS-COV-2): ICD-10-CM

## 2021-06-25 DIAGNOSIS — S72.002A CLOSED FRACTURE OF LEFT HIP, INITIAL ENCOUNTER (H): ICD-10-CM

## 2021-06-25 DIAGNOSIS — S72.142A CLOSED DISPLACED INTERTROCHANTERIC FRACTURE OF LEFT FEMUR, INITIAL ENCOUNTER (H): ICD-10-CM
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
## 2021-06-25 LAB
ABO + RH BLD: NORMAL
ABO + RH BLD: NORMAL
ALBUMIN UR-MCNC: 10 MG/DL
ANION GAP SERPL CALCULATED.3IONS-SCNC: 7 MMOL/L (ref 3–14)
APPEARANCE UR: CLEAR
BACTERIA #/AREA URNS HPF: ABNORMAL /HPF
BASOPHILS # BLD AUTO: 0 10E9/L (ref 0–0.2)
BASOPHILS NFR BLD AUTO: 0.1 %
BILIRUB UR QL STRIP: NEGATIVE
BLD GP AB SCN SERPL QL: NORMAL
BLOOD BANK CMNT PATIENT-IMP: NORMAL
BUN SERPL-MCNC: 29 MG/DL (ref 7–30)
CALCIUM SERPL-MCNC: 9.3 MG/DL (ref 8.5–10.1)
CHLORIDE SERPL-SCNC: 103 MMOL/L (ref 94–109)
CO2 SERPL-SCNC: 28 MMOL/L (ref 20–32)
COLOR UR AUTO: YELLOW
CREAT SERPL-MCNC: 0.75 MG/DL (ref 0.52–1.04)
DIFFERENTIAL METHOD BLD: ABNORMAL
EOSINOPHIL # BLD AUTO: 0 10E9/L (ref 0–0.7)
EOSINOPHIL NFR BLD AUTO: 0 %
ERYTHROCYTE [DISTWIDTH] IN BLOOD BY AUTOMATED COUNT: 14.3 % (ref 10–15)
GFR SERPL CREATININE-BSD FRML MDRD: 72 ML/MIN/{1.73_M2}
GLUCOSE SERPL-MCNC: 106 MG/DL (ref 70–99)
GLUCOSE UR STRIP-MCNC: NEGATIVE MG/DL
HCT VFR BLD AUTO: 32.8 % (ref 35–47)
HGB BLD-MCNC: 10.7 G/DL (ref 11.7–15.7)
HGB UR QL STRIP: NEGATIVE
IMM GRANULOCYTES # BLD: 0.1 10E9/L (ref 0–0.4)
IMM GRANULOCYTES NFR BLD: 0.8 %
INR PPP: 1.03 (ref 0.86–1.14)
KETONES UR STRIP-MCNC: 10 MG/DL
LABORATORY COMMENT REPORT: NORMAL
LEUKOCYTE ESTERASE UR QL STRIP: NEGATIVE
LYMPHOCYTES # BLD AUTO: 0.3 10E9/L (ref 0.8–5.3)
LYMPHOCYTES NFR BLD AUTO: 2 %
MCH RBC QN AUTO: 30.6 PG (ref 26.5–33)
MCHC RBC AUTO-ENTMCNC: 32.6 G/DL (ref 31.5–36.5)
MCV RBC AUTO: 94 FL (ref 78–100)
MONOCYTES # BLD AUTO: 1.1 10E9/L (ref 0–1.3)
MONOCYTES NFR BLD AUTO: 6.9 %
MUCOUS THREADS #/AREA URNS LPF: PRESENT /LPF
NEUTROPHILS # BLD AUTO: 14.4 10E9/L (ref 1.6–8.3)
NEUTROPHILS NFR BLD AUTO: 90.2 %
NITRATE UR QL: NEGATIVE
NRBC # BLD AUTO: 0 10*3/UL
NRBC BLD AUTO-RTO: 0 /100
PH UR STRIP: 5 PH (ref 5–7)
PLATELET # BLD AUTO: 664 10E9/L (ref 150–450)
POTASSIUM SERPL-SCNC: 3.3 MMOL/L (ref 3.4–5.3)
RBC # BLD AUTO: 3.5 10E12/L (ref 3.8–5.2)
RBC #/AREA URNS AUTO: <1 /HPF (ref 0–2)
SARS-COV-2 RNA RESP QL NAA+PROBE: NEGATIVE
SODIUM SERPL-SCNC: 138 MMOL/L (ref 133–144)
SOURCE: ABNORMAL
SP GR UR STRIP: 1.02 (ref 1–1.03)
SPECIMEN EXP DATE BLD: NORMAL
SPECIMEN SOURCE: NORMAL
SQUAMOUS #/AREA URNS AUTO: 0 /HPF (ref 0–1)
UROBILINOGEN UR STRIP-MCNC: NORMAL MG/DL (ref 0–2)
WBC # BLD AUTO: 15.9 10E9/L (ref 4–11)
WBC #/AREA URNS AUTO: 1 /HPF (ref 0–5)

## 2021-06-25 PROCEDURE — 271N000002 HC RX 271: Performed by: STUDENT IN AN ORGANIZED HEALTH CARE EDUCATION/TRAINING PROGRAM

## 2021-06-25 PROCEDURE — 73552 X-RAY EXAM OF FEMUR 2/>: CPT | Mod: 26 | Performed by: RADIOLOGY

## 2021-06-25 PROCEDURE — C9803 HOPD COVID-19 SPEC COLLECT: HCPCS | Performed by: EMERGENCY MEDICINE

## 2021-06-25 PROCEDURE — 85610 PROTHROMBIN TIME: CPT | Performed by: EMERGENCY MEDICINE

## 2021-06-25 PROCEDURE — 120N000002 HC R&B MED SURG/OB UMMC

## 2021-06-25 PROCEDURE — 73502 X-RAY EXAM HIP UNI 2-3 VIEWS: CPT | Mod: 26 | Performed by: RADIOLOGY

## 2021-06-25 PROCEDURE — 99222 1ST HOSP IP/OBS MODERATE 55: CPT | Mod: AI | Performed by: INTERNAL MEDICINE

## 2021-06-25 PROCEDURE — 96361 HYDRATE IV INFUSION ADD-ON: CPT | Performed by: EMERGENCY MEDICINE

## 2021-06-25 PROCEDURE — 86850 RBC ANTIBODY SCREEN: CPT | Performed by: EMERGENCY MEDICINE

## 2021-06-25 PROCEDURE — 99285 EMERGENCY DEPT VISIT HI MDM: CPT | Mod: 25 | Performed by: EMERGENCY MEDICINE

## 2021-06-25 PROCEDURE — 682N000003 HC TRAUMA EVALUATION W/O CC LEVEL II: Performed by: EMERGENCY MEDICINE

## 2021-06-25 PROCEDURE — 250N000013 HC RX MED GY IP 250 OP 250 PS 637: Performed by: PHYSICIAN ASSISTANT

## 2021-06-25 PROCEDURE — 86900 BLOOD TYPING SEROLOGIC ABO: CPT | Performed by: EMERGENCY MEDICINE

## 2021-06-25 PROCEDURE — 250N000011 HC RX IP 250 OP 636: Performed by: STUDENT IN AN ORGANIZED HEALTH CARE EDUCATION/TRAINING PROGRAM

## 2021-06-25 PROCEDURE — 86901 BLOOD TYPING SEROLOGIC RH(D): CPT | Performed by: EMERGENCY MEDICINE

## 2021-06-25 PROCEDURE — 36415 COLL VENOUS BLD VENIPUNCTURE: CPT | Performed by: PHYSICIAN ASSISTANT

## 2021-06-25 PROCEDURE — 99222 1ST HOSP IP/OBS MODERATE 55: CPT | Mod: 57 | Performed by: PHYSICIAN ASSISTANT

## 2021-06-25 PROCEDURE — 93005 ELECTROCARDIOGRAM TRACING: CPT | Performed by: EMERGENCY MEDICINE

## 2021-06-25 PROCEDURE — 81001 URINALYSIS AUTO W/SCOPE: CPT | Performed by: EMERGENCY MEDICINE

## 2021-06-25 PROCEDURE — 250N000011 HC RX IP 250 OP 636: Performed by: EMERGENCY MEDICINE

## 2021-06-25 PROCEDURE — 258N000003 HC RX IP 258 OP 636: Performed by: EMERGENCY MEDICINE

## 2021-06-25 PROCEDURE — 99222 1ST HOSP IP/OBS MODERATE 55: CPT | Performed by: PHYSICIAN ASSISTANT

## 2021-06-25 PROCEDURE — 96374 THER/PROPH/DIAG INJ IV PUSH: CPT | Performed by: EMERGENCY MEDICINE

## 2021-06-25 PROCEDURE — 80048 BASIC METABOLIC PNL TOTAL CA: CPT | Performed by: EMERGENCY MEDICINE

## 2021-06-25 PROCEDURE — 73502 X-RAY EXAM HIP UNI 2-3 VIEWS: CPT

## 2021-06-25 PROCEDURE — 73552 X-RAY EXAM OF FEMUR 2/>: CPT | Mod: LT

## 2021-06-25 PROCEDURE — U0005 INFEC AGEN DETEC AMPLI PROBE: HCPCS | Performed by: EMERGENCY MEDICINE

## 2021-06-25 PROCEDURE — 85025 COMPLETE CBC W/AUTO DIFF WBC: CPT | Performed by: PHYSICIAN ASSISTANT

## 2021-06-25 PROCEDURE — 93010 ELECTROCARDIOGRAM REPORT: CPT | Performed by: EMERGENCY MEDICINE

## 2021-06-25 PROCEDURE — 99207 PR CDG-MDM COMPONENT: MEETS MODERATE - DOWN CODED: CPT | Performed by: INTERNAL MEDICINE

## 2021-06-25 PROCEDURE — U0003 INFECTIOUS AGENT DETECTION BY NUCLEIC ACID (DNA OR RNA); SEVERE ACUTE RESPIRATORY SYNDROME CORONAVIRUS 2 (SARS-COV-2) (CORONAVIRUS DISEASE [COVID-19]), AMPLIFIED PROBE TECHNIQUE, MAKING USE OF HIGH THROUGHPUT TECHNOLOGIES AS DESCRIBED BY CMS-2020-01-R: HCPCS | Performed by: EMERGENCY MEDICINE

## 2021-06-25 PROCEDURE — 99291 CRITICAL CARE FIRST HOUR: CPT | Mod: 25 | Performed by: EMERGENCY MEDICINE

## 2021-06-25 RX ORDER — ALENDRONATE SODIUM 70 MG/1
70 TABLET ORAL
Status: DISCONTINUED | OUTPATIENT
Start: 2021-06-25 | End: 2021-06-25

## 2021-06-25 RX ORDER — HYDROCHLOROTHIAZIDE 25 MG/1
25 TABLET ORAL EVERY OTHER DAY
Status: DISCONTINUED | OUTPATIENT
Start: 2021-06-25 | End: 2021-01-01 | Stop reason: HOSPADM

## 2021-06-25 RX ORDER — SODIUM CHLORIDE 9 MG/ML
INJECTION, SOLUTION INTRAVENOUS CONTINUOUS
Status: DISCONTINUED | OUTPATIENT
Start: 2021-06-25 | End: 2021-01-01

## 2021-06-25 RX ORDER — CALCIUM CARBONATE 500 MG/1
500 TABLET, CHEWABLE ORAL DAILY
Status: DISCONTINUED | OUTPATIENT
Start: 2021-06-25 | End: 2021-01-01 | Stop reason: HOSPADM

## 2021-06-25 RX ORDER — DONEPEZIL HYDROCHLORIDE 10 MG/1
10 TABLET, FILM COATED ORAL AT BEDTIME
COMMUNITY
End: 2021-01-01

## 2021-06-25 RX ORDER — HYDROCHLOROTHIAZIDE 12.5 MG/1
25 CAPSULE ORAL EVERY OTHER DAY
COMMUNITY
End: 2021-01-01

## 2021-06-25 RX ORDER — ALENDRONATE SODIUM 70 MG/1
70 TABLET ORAL
COMMUNITY
End: 2021-01-01

## 2021-06-25 RX ORDER — HYDROMORPHONE HYDROCHLORIDE 1 MG/ML
0.5 INJECTION, SOLUTION INTRAMUSCULAR; INTRAVENOUS; SUBCUTANEOUS
Status: DISCONTINUED | OUTPATIENT
Start: 2021-06-25 | End: 2021-06-25

## 2021-06-25 RX ORDER — QUINAPRIL 40 MG/1
40 TABLET ORAL DAILY
COMMUNITY
End: 2021-01-01

## 2021-06-25 RX ORDER — ATORVASTATIN CALCIUM 10 MG/1
10 TABLET, FILM COATED ORAL DAILY
COMMUNITY
End: 2021-01-01

## 2021-06-25 RX ORDER — METHOCARBAMOL 500 MG/1
500 TABLET, FILM COATED ORAL 4 TIMES DAILY
Status: DISCONTINUED | OUTPATIENT
Start: 2021-06-25 | End: 2021-01-01 | Stop reason: HOSPADM

## 2021-06-25 RX ORDER — CHOLECALCIFEROL (VITAMIN D3) 1250 MCG
50000 CAPSULE ORAL WEEKLY
Status: DISCONTINUED | OUTPATIENT
Start: 2021-06-28 | End: 2021-01-01 | Stop reason: HOSPADM

## 2021-06-25 RX ORDER — ONDANSETRON 2 MG/ML
4 INJECTION INTRAMUSCULAR; INTRAVENOUS EVERY 30 MIN PRN
Status: DISCONTINUED | OUTPATIENT
Start: 2021-06-25 | End: 2021-01-01 | Stop reason: HOSPADM

## 2021-06-25 RX ORDER — DONEPEZIL HYDROCHLORIDE 10 MG/1
10 TABLET, FILM COATED ORAL AT BEDTIME
Status: DISCONTINUED | OUTPATIENT
Start: 2021-06-25 | End: 2021-01-01 | Stop reason: HOSPADM

## 2021-06-25 RX ORDER — ACETAMINOPHEN 325 MG/1
975 TABLET ORAL 3 TIMES DAILY
Status: DISCONTINUED | OUTPATIENT
Start: 2021-06-25 | End: 2021-01-01 | Stop reason: HOSPADM

## 2021-06-25 RX ORDER — ATORVASTATIN CALCIUM 10 MG/1
10 TABLET, FILM COATED ORAL DAILY
Status: DISCONTINUED | OUTPATIENT
Start: 2021-06-25 | End: 2021-01-01 | Stop reason: HOSPADM

## 2021-06-25 RX ORDER — HYDROMORPHONE HCL IN WATER/PF 6 MG/30 ML
0.2 PATIENT CONTROLLED ANALGESIA SYRINGE INTRAVENOUS
Status: DISCONTINUED | OUTPATIENT
Start: 2021-06-25 | End: 2021-06-26 | Stop reason: CLARIF

## 2021-06-25 RX ORDER — CALCIUM CARBONATE 500 MG/1
1 TABLET, CHEWABLE ORAL DAILY
COMMUNITY
End: 2021-01-01

## 2021-06-25 RX ORDER — VIT C/E/CUPERIC/ZINC/LUTEIN 226-90-0.8
CAPSULE ORAL DAILY
Status: DISCONTINUED | OUTPATIENT
Start: 2021-06-25 | End: 2021-06-25

## 2021-06-25 RX ORDER — UBIDECARENONE 75 MG
100 CAPSULE ORAL DAILY
COMMUNITY
End: 2021-01-01

## 2021-06-25 RX ADMIN — METHOCARBAMOL 500 MG: 500 TABLET, FILM COATED ORAL at 16:03

## 2021-06-25 RX ADMIN — Medication 100 MCG: at 14:37

## 2021-06-25 RX ADMIN — HYDROMORPHONE HYDROCHLORIDE 0.5 MG: 1 INJECTION, SOLUTION INTRAMUSCULAR; INTRAVENOUS; SUBCUTANEOUS at 10:07

## 2021-06-25 RX ADMIN — ACETAMINOPHEN 975 MG: 325 TABLET, FILM COATED ORAL at 14:33

## 2021-06-25 RX ADMIN — CALCIUM CARBONATE (ANTACID) CHEW TAB 500 MG 500 MG: 500 CHEW TAB at 14:35

## 2021-06-25 RX ADMIN — METHOCARBAMOL 500 MG: 500 TABLET, FILM COATED ORAL at 21:11

## 2021-06-25 RX ADMIN — SODIUM CHLORIDE: 9 INJECTION, SOLUTION INTRAVENOUS at 23:17

## 2021-06-25 RX ADMIN — ACETAMINOPHEN 975 MG: 325 TABLET, FILM COATED ORAL at 21:11

## 2021-06-25 RX ADMIN — METHOCARBAMOL 500 MG: 500 TABLET, FILM COATED ORAL at 14:38

## 2021-06-25 RX ADMIN — Medication: at 17:18

## 2021-06-25 RX ADMIN — DONEPEZIL HYDROCHLORIDE 10 MG: 10 TABLET ORAL at 23:16

## 2021-06-25 RX ADMIN — SODIUM CHLORIDE: 9 INJECTION, SOLUTION INTRAVENOUS at 11:09

## 2021-06-25 RX ADMIN — HYDROCHLOROTHIAZIDE 25 MG: 25 TABLET ORAL at 14:35

## 2021-06-25 RX ADMIN — ATORVASTATIN CALCIUM 10 MG: 10 TABLET, FILM COATED ORAL at 14:37

## 2021-06-25 ASSESSMENT — ACTIVITIES OF DAILY LIVING (ADL)
TOILETING_ISSUES: YES
DRESSING/BATHING: BATHING DIFFICULTY, ASSISTANCE 1 PERSON;DRESSING DIFFICULTY, ASSISTANCE 1 PERSON
HEARING_DIFFICULTY_OR_DEAF: NO
WALKING_OR_CLIMBING_STAIRS_DIFFICULTY: YES
WEAR_GLASSES_OR_BLIND: YES
FALL_HISTORY_WITHIN_LAST_SIX_MONTHS: YES
DRESSING/BATHING_DIFFICULTY: YES
DOING_ERRANDS_INDEPENDENTLY_DIFFICULTY: OTHER (SEE COMMENTS)
DIFFICULTY_EATING/SWALLOWING: NO
WALKING_OR_CLIMBING_STAIRS: AMBULATION DIFFICULTY, ASSISTANCE 1 PERSON
WHICH_OF_THE_ABOVE_FUNCTIONAL_RISKS_HAD_A_RECENT_ONSET_OR_CHANGE?: AMBULATION
NUMBER_OF_TIMES_PATIENT_HAS_FALLEN_WITHIN_LAST_SIX_MONTHS: 1
DIFFICULTY_COMMUNICATING: NO
CONCENTRATING,_REMEMBERING_OR_MAKING_DECISIONS_DIFFICULTY: YES
VISION_MANAGEMENT: EYEGLASSES

## 2021-06-25 ASSESSMENT — ENCOUNTER SYMPTOMS
HEADACHES: 0
NECK PAIN: 0
NUMBNESS: 0
ARTHRALGIAS: 1
ABDOMINAL PAIN: 0
FEVER: 0
WOUND: 0
NECK STIFFNESS: 0
VOMITING: 0
COUGH: 0
NAUSEA: 0
BACK PAIN: 0
SHORTNESS OF BREATH: 0
DYSURIA: 0

## 2021-06-25 NOTE — ED TRIAGE NOTES
86 year old female with history of mild dementia, is presented by [facility] after a possible fall this morning.  Patient notes left sided hip pain and left leg is rotated laterally.  CMS intact.

## 2021-06-25 NOTE — H&P
[facility]    History and Physical: Trauma Service       Date of Admission:  6/25/2021    Time of Admission/Consult Request (page/call): called prior to imaging by ED  Time of my evaluation: 1140  Consulting services:  Orthopedics - Non-emergent consult: Called by ED    Assessment   Trauma mechanism: fall from bed  Time/date of injury: 6/25/21  Known Injuries:  1. Left intertrochanteric hip fracture    Procedure:  1. Planned Orthopaedic Surgery     Neuro/Pain/Psych:  # Fall   # Acute on chronic pain   - Scheduled: Tylenol, Robaxin  - Prn: oxycodone 2.5, dilaudid 0.2  - RAPS consulted for block. Patient at increased risk for delirium with opioid medications in the setting of trauma, hospitalization, and advanced age.     # Mild Dementia   - continue PTA: Aricept     Pulmonary:  - No acute concerns  - Supplemental oxygen to keep saturation above 92 %.    Cardiovascular:    # Hypertension   - Monitor hemodynamic status.   - continue PTA: atorvastatin, hctz    GI/Nutrition:    - npo, dependent on OR plan     Renal/ Fluids/Electrolytes:  - Creatinine: 0.75  - NS for IV fluid hydration.     Endocrine:  - No management indication.    Infectious disease:   # Stress Leukocytosis  - WBC: 15.9  - No indications for antibiotics.   - UA pending     Hematology:    # Acute blood loss anemia   - Hgb 10.7. Monitor and trend.   - Threshold for transfusion if hgb <7.0 or signs/symptoms of hypoperfusion.       # Thrombocytosis, unknown cause   - Recent Platelet Counts: 11/16/20: 686, 4/29/21: 765  - Platelets: 664    Musculoskeletal:  # Hx of Osteoporosis   # Hx of vitamin D Deficiency   # Weakness and deconditioning of chronic illness   - Hold PTA: fosomax  - Physical and occupational therapy consults.    Skin:   - dilgent cares to prevent skin breakdown and wound formation.      Primary Care Physician   Physician No Ref-Primary      Plan   1. Patient cleared for Surgery  2. [place]  [facility] Garrido PA-C  Primary team: Trauma Services   Job code pager [phone] (24 hours a day)  Use [employer] to text page (not text page compatible with [email or web address]).    Dial * * * [phone] then  [phone], wait for prompt and then enter call back number.   Do NOT call numbers listed to right in Treatment Team section.       Chief Complaint   Fall     History is obtained from the patient    History of Present Illness   Debi Morton is a 86 year old female who presented via EMS. Patient is a poor historian because of baseline dementia. Per patient she was attempting to get out of bed and fell. EMS found patient on floor in her Assisted Living bed room. Patient had pain on right hip and was unable to ambulate. She does not believe she hit her head or had LOC. No acute traumatic findings found on exam that would show that she hit her head.   Her family was bedside during exam and helped with patient's orientation and pain management plan.     Past Medical History    I have reviewed this patient's medical history and updated it with pertinent information if needed.   Past Medical History:   Diagnosis Date     Dementia (H)      Gastroesophageal reflux disease      Hypercalcemia      Hypertension      Lipidemia      Osteoporosis      Venous insufficiency      Vitamin D deficiency        Past Surgical History   I have reviewed this patient's surgical history and updated it with pertinent information if needed.  History reviewed. No pertinent surgical history.  Prior to Admission Medications   Prior to Admission Medications   Prescriptions Last Dose Informant Patient Reported? Taking?   Cholecalciferol (VITAMIN D3) 1.25 MG (76912 UT) TABS Past Week at Unknown time Self Yes Yes   Sig: Take 50,000 Units by mouth once a week   Multiple Vitamins-Minerals (PRESERVISION/LUTEIN PO) Past Week at Unknown time Self Yes Yes   Sig: Take 1 tablet by mouth daily   alendronate (FOSAMAX) 70 MG tablet Past Week at Unknown time Self  Yes Yes   Sig: Take 70 mg by mouth every 7 days   atorvastatin (LIPITOR) 10 MG tablet Past Week at Unknown time Self Yes Yes   Sig: Take 10 mg by mouth daily   calcium carbonate (TUMS) 500 MG chewable tablet Past Week at Unknown time Self Yes Yes   Sig: Take 1 chew tab by mouth daily   cyanocobalamin (VITAMIN B-12) 100 MCG tablet Past Week at Unknown time Self Yes Yes   Sig: Take 100 mcg by mouth daily   donepezil (ARICEPT) 10 MG tablet Past Week at Unknown time Self Yes Yes   Sig: Take 10 mg by mouth At Bedtime   hydrochlorothiazide (MICROZIDE) 12.5 MG capsule Past Week at Unknown time Self Yes Yes   Sig: Take 25 mg by mouth every other day   quinapril (ACCUPRIL) 40 MG tablet Past Week at Unknown time Self Yes Yes   Sig: Take 40 mg by mouth daily      Facility-Administered Medications: None     Allergies   Allergies   Allergen Reactions     Penicillins Unknown       Social History   Social History     Socioeconomic History     Marital status:      Spouse name: Not on file     Number of children: Not on file     Years of education: Not on file     Highest education level: Not on file   Occupational History     Not on file   Social Needs     Financial resource strain: Not on file     Food insecurity     Worry: Not on file     Inability: Not on file     Transportation needs     Medical: Not on file     Non-medical: Not on file   Tobacco Use     Smoking status: Unknown If Ever Smoked   Substance and Sexual Activity     Alcohol use: Not Currently     Drug use: Not Currently     Sexual activity: Not on file   Lifestyle     Physical activity     Days per week: Not on file     Minutes per session: Not on file     Stress: Not on file   Relationships     Social connections     Talks on phone: Not on file     Gets together: Not on file     Attends Latter day service: Not on file     Active member of club or organization: Not on file     Attends meetings of clubs or organizations: Not on file     Relationship status: Not  on file     Intimate partner violence     Fear of current or ex partner: Not on file     Emotionally abused: Not on file     Physically abused: Not on file     Forced sexual activity: Not on file   Other Topics Concern     Not on file   Social History Narrative     Not on file       Family History   Family history reviewed with patient and is noncontributory.    Review of Systems   CONSTITUTIONAL: No fever, chills, sweats, fatigue   EYES: no visual blurring, no double vision or visual loss  ENT: no decrease in hearing, no tinnitus, no vertigo, no hoarseness  RESPIRATORY: no shortness of breath, no cough, no sputum   CARDIOVASCULAR: no palpitations, no chest  pain, no exertional chest pain or pressure  GASTROINTESTINAL: no nausea or vomiting, or abd pain  GENITOURINARY: no dysuria, no frequency or hesitancy, no hematuria  MUSCULOSKELETAL: no weakness, no redness, no swelling, no joint pain,   SKIN: positive for ecchymoses and abrasions   NEUROLOGIC: no numbness or tingling of hands, no numbness or tingling  of feet, no syncope, no tremors or weakness  PSYCHIATRIC: no sleep disturbances, no anxiety or depression    Physical Exam   Temp: 98.1  F (36.7  C) Temp src: Oral BP: (!) 141/67 Pulse: 73   Resp: 16 SpO2: 98 % O2 Device: None (Room air)    Vital Signs with Ranges  Temp:  [98.1  F (36.7  C)] 98.1  F (36.7  C)  Pulse:  [71-78] 73  Resp:  [16-21] 16  BP: (134-141)/(67-95) 141/67  SpO2:  [98 %] 98 % 0 lbs 0 oz    Primary Survey:   Airway: patient talking  Breathing: symmetric respiratory effort bilaterally  Circulation: central pulses present and peripheral pulses present  Disability: Pupils - left 4 mm and brisk, right 4 mm and brisk     Shannon Coma Scale - Total 15/15    Secondary Survey:  General: alert, oriented to person, place, time  Head: atraumatic, normocephalic,  Eyes: PERRLA, pupils 4mm, EOMI, corneas and conjunctivae clear  Nose: nares patent, no drainage, nasal septum non-tender  Mouth/Throat: no  exudates or erythema,  no dental tenderness or malocclusions, no tongue lacerations  Neck: Trachea midline. No midline posterior tenderness, full AROM without pain or tenderness   Chest/Pulmonary: normal respiratory rate and rhythm,  bilateral clear breath sounds, no wheezes, rales or rhonchi, no chest wall tenderness or deformities,   Cardiovascular: S1, S2,  normal and regular rate and rhythm, no murmurs  Abdomen: soft, non-tender, no guarding, no rebound tenderness and no tenderness to palpation  : normal external genitalia, pelvis stable to lateral compression, no gutierrez, no urine assess  Back/Spine: no deformity, no midline tenderness, no sacral tenderness,  no step-offs  Musculoskel/Extremities: tenderness to right hip, no ROM to joint. Rest of major joints at baseline ROM without tenderness, edema, erythema, ecchymosis, or abrasions. + PP. - edema.   Hand: no gross deformities of hands or fingers. Full AROM of hand and fingers in flexion and extension.  strength equal and symmetric.   Skin: some ecchymosis throughout, skin warm and dry.   Neuro: PERRLA, alert, oriented x 3. CN II-XII grossly intact. No focal deficits. Strength 3/5 x 4 extremities.  Sensation intact.  Psychiatric: affect/mood normal, cooperative, normal judgement/insight and memory intact  # Pain Assessment:  Current Pain Score 6/25/2021   Patient currently in pain? yes   - Debi is experiencing pain due to fracture. Pain management was discussed with Debi and her son and daughter in law and the plan was created in a collaborative fashion.  Debi's response to the current recommendations: engaged  - Please see the plan for pain management as documented above      Data   Results for orders placed or performed during the hospital encounter of 06/25/21 (from the past 24 hour(s))   EKG 12-lead, tracing only   Result Value Ref Range    Interpretation ECG Click View Image link to view waveform and result    Basic metabolic panel   Result Value  Ref Range    Sodium 138 133 - 144 mmol/L    Potassium 3.3 (L) 3.4 - 5.3 mmol/L    Chloride 103 94 - 109 mmol/L    Carbon Dioxide 28 20 - 32 mmol/L    Anion Gap 7 3 - 14 mmol/L    Glucose 106 (H) 70 - 99 mg/dL    Urea Nitrogen 29 7 - 30 mg/dL    Creatinine 0.75 0.52 - 1.04 mg/dL    GFR Estimate 72 >60 mL/min/[1.73_m2]    GFR Estimate If Black 84 >60 mL/min/[1.73_m2]    Calcium 9.3 8.5 - 10.1 mg/dL   ABO/Rh type and screen   Result Value Ref Range    ABO O     RH(D) Pos     Antibody Screen Neg     Test Valid Only At          [facility]    Specimen Expires 06/28/2021    INR   Result Value Ref Range    INR 1.03 0.86 - 1.14   Asymptomatic SARS-CoV-2 COVID-19 Virus (Coronavirus) by PCR    Specimen: Nasopharyngeal   Result Value Ref Range    SARS-CoV-2 Virus Specimen Source Nasopharyngeal     SARS-CoV-2 PCR Result NEGATIVE     SARS-CoV-2 PCR Comment       Testing was performed using the Xpert Xpress SARS-CoV-2 Assay on the Cepheid Gene-Xpert   Instrument Systems. Additional information about this Emergency Use Authorization (EUA)   assay can be found via the Lab Guide.     XR Pelvis and Hip Left 2 Views    Narrative    1 view pelvis on the left hip radiographs 6/25/2021 11:10 AM    History: fall, deformity    Comparison: None available.    Findings:    AP view of pelvis, 3 crosstable lateral views of the left hip were  obtained.     Communicated left intertrochanteric fracture with coxa varus  alignment.    Degenerative changes of bilateral hips with associated  mild-to-moderate joint space loss.     Others:    Enthesopathic changes of pelvis and trochanters. Degenerative changes  of the visualized lumbar spine and pubic symphysis.    Vascular calcifications. Bones appear osteopenic.      Impression    Impression: Communicated left intertrochanteric fracture with coxa  varus alignment.    SWAPNA HEBERT   CBC with platelets differential   Result Value Ref Range    WBC 15.9  (H) 4.0 - 11.0 10e9/L    RBC Count 3.50 (L) 3.8 - 5.2 10e12/L    Hemoglobin 10.7 (L) 11.7 - 15.7 g/dL    Hematocrit 32.8 (L) 35.0 - 47.0 %    MCV 94 78 - 100 fl    MCH 30.6 26.5 - 33.0 pg    MCHC 32.6 31.5 - 36.5 g/dL    RDW 14.3 10.0 - 15.0 %    Platelet Count 664 (H) 150 - 450 10e9/L    Diff Method Automated Method     % Neutrophils 90.2 %    % Lymphocytes 2.0 %    % Monocytes 6.9 %    % Eosinophils 0.0 %    % Basophils 0.1 %    % Immature Granulocytes 0.8 %    Nucleated RBCs 0 0 /100    Absolute Neutrophil 14.4 (H) 1.6 - 8.3 10e9/L    Absolute Lymphocytes 0.3 (L) 0.8 - 5.3 10e9/L    Absolute Monocytes 1.1 0.0 - 1.3 10e9/L    Absolute Eosinophils 0.0 0.0 - 0.7 10e9/L    Absolute Basophils 0.0 0.0 - 0.2 10e9/L    Abs Immature Granulocytes 0.1 0 - 0.4 10e9/L    Absolute Nucleated RBC 0.0    XR Femur Left 2 Views    Narrative    EXAM: XR FEMUR LEFT 2 VIEW  6/25/2021 12:37 PM      HISTORY: left hip fracture, assess distal femur    COMPARISON: None    FINDINGS: AP and lateral views of the mid through distal left femur.    No acute osseous abnormality. Degenerative changes of the left knee,  especially medial compartments. Vascular calcifications.       Impression    IMPRESSION: No acute osseous abnormality.         FREEDOM CORREA MD (Joe)       Studies:  XR Pelvis and Hip Left 2 Views   Final Result   Impression: Communicated left intertrochanteric fracture with coxa   varus alignment.      SWAPNA HEBERT      XR Femur Left 2 Views    (Results Pending)

## 2021-06-25 NOTE — CONSULTS
[facility] Orthopedic Consultation    Debi Morton MRN# [id number]   Age: 86 year old YOB: 1934   Date of Admission:  6/25/2021    Reason for consult: Left hip fracture   Requesting physician: Jud Santos MD   Level of consult: Consult, follow and place orders          Impression and Recommendation:   Impression:  Debi Morton is an 86 year old female who presents s/p ground level fall with:  1. Left intertrochanteric hip fracture  2. Dementia  3. HTN     Recomendations: I spoke to the patient and her family about this left hip comminuted intertrochanteric hip fracture.  I explained that the best chance for healing and future ambulation is to do a surgery.  I explained what that would entail.  I explained the risks of the surgery, including:  Bleeding, clotting, infection, hardware failure, malunion/nonunion, and risks of anesthesia.  Since she was independently walking without gait assistance prior to this injury, our hope would be to return her to that level of function, but it is possible she may require a walker or cane from now on.  She will remain in the hospital for a few days, and then go to TCU facility for a few weeks.  This fracture will take 2-3 months to heal.  With her dementia, she is at risk for increased confusion or delirium with anesthesia and narcotics.  I will have the Pain Team see this patient to place a nerve pain catheter in the left hip to provide pain relief before, during and after the surgery.  Our hope is we can then use less narcotics.  Also, we would like to transfer this patient to [facility] to expedite the surgery for either tonight or tomorrow morning.  Dr. Mckenzie will meet the patient and her family before surgery.  I will update the family on the plan once it has been finalized.   I would like a U/A, CBC with platelets, as her platelet count has been high frequently, and a type and screen. They agree with the plan of care and  all questions have been answered.    Operative Plan: Left hip ORIF with Loyal long gamma nail   - NPO now, unless surgery tomorrow AM, then NPO at midnight   - Labs: CBC, BMP, INR/PTT, Type and Screen; COVID test   - Consent: To be obtained   - Case request by Dr. Mckenzie   - Medical clearance for surgery to be performed by Primary Team    Activity: NWB LLE  : may require gutierrez or straight cath due to pain and NWB status  Diet: NPO now until timing of surgery has been determined.  If surgery not until tomorrow, then NPO at midnight  Pain: PO/IV meds. Transition to PO meds only as patient tolerates. Consult pain team to place nerve catheter  DVT ppx: Mechanical prophylaxis at this time  Imaging: films reviewed and are satisfactory. No further imaging need at this time.  Consults: Anesthesia pain team for placement of nerve pain catheter.  Medicine for surgical clearance  Dispo: OR tonight or tomorrow  Follow up: TBD - likely 2 wks after surgery for wound check         Chief Complaint:   Left hip pain         History of Present Illness:   This patient is a 86 year old female with a significant past medical history of hypertension, osteoporosis and dementia who presents with Left hip pain and deformity after a fall getting out of bed this morning.  The details of the fall are not clear.  Patient states the phone rang and she got out of bed to answer it and was on the floor.  She tried to get up but fell again.  Someone found her and called the ambulance.  She normally walks without gait assistance and lives alone in an assisted living facility.  She states she had no previous hip pain, but she does have dementia and short term memory is poor.  Patient's son and daughter are here with her in the ED today.  Ortho was consulted to assess left hip fracture.  Patient denies any other areas of pain.  She has just moved to [place] this last year from [place].  Care facility has medical records if needed.  Patient states she gets a  "mild cough when lying flat, but otherwise, she is feeling ok.  She has been NPO all day today.  No other concerns.     History obtained from patient interview and chart review.        Past Medical History:     Past Medical History:   Diagnosis Date     Dementia (H)      Gastroesophageal reflux disease      Hypercalcemia      Hypertension      Lipidemia      Osteoporosis      Venous insufficiency      Vitamin D deficiency              Past Surgical History:   History reviewed. No pertinent surgical history.          Social History:   Tobacco use: 0 packs/day for 0 years  Alcohol use: 0 drinks/day  Elicit drug use: Patient denies  Occupation: retired  Living situation: assisted living facilty  Family contact information: Daughter is financial POA, Son Perfecto is medical POA          Family History:   No family history of anesthesia, bleeding or clotting complications.           Allergies:     Allergies   Allergen Reactions     Penicillins Unknown             Medications:   Medication reviewed with patient and in chart.          Review of Systems:    ROS: 10 point ROS neg other than the symptoms noted above in the HPI.          Physical Exam:     BP (!) 141/67   Pulse 73   Temp 98.1  F (36.7  C) (Oral)   Resp 16   Ht 1.676 m (5' 6\")   SpO2 98%   General: awake, alert, cooperative, no apparent distress, appears stated age  HEENT: normocephalic, atraumatic, PERRL, EOMI, no scleral icterus, MMM  Respiratory: breathing non-labored, no wheezing  Cardiovascular: peripheral pulses 2+ and symmetric, capillary refill < 2sec, skin wwp  Skin: no rashes or lesions  Neurological: A&Ox3, CN II-XII grossly intact  Musculoskeletal:  LLE: Patient's left leg is externally rotated, flexed and shortened.  Patient points to lateral hip and groin as painful.  Skin intact. Tenderness to palpation about the hip. Fires TA/Gastroc/EHL/FHL. SILT in femoral, sural, saphenous, deep peroneal, superficial peroneal, and tibial nerve distributions. " Dorsalis pedis and posterior tibial arteries 2+ and foot wwp with BCR.           Imaging:   Review of Left hip/pelvis films from 6/25/2021 demonstrate:  1. Communicated left intertrochanteric fracture with coxa  varus alignment.    Left femur xrays pending at time of this dictation          Laboratory date:   CBC:  Lab Results   Component Value Date    WBC 15.9 (H) 06/25/2021    HGB 10.7 (L) 06/25/2021     (H) 06/25/2021       BMP:  Lab Results   Component Value Date     06/25/2021    POTASSIUM 3.3 (L) 06/25/2021    CHLORIDE 103 06/25/2021    CO2 28 06/25/2021    BUN 29 06/25/2021    CR 0.75 06/25/2021    ANIONGAP 7 06/25/2021    TAY 9.3 06/25/2021     (H) 06/25/2021       Inflammatory Markers:  Lab Results   Component Value Date    WBC 15.9 (H) 06/25/2021       Time Patient Evaluated: 1130      Luz Elena Odom PA-C  Department of Orthopedics  [phone]

## 2021-06-25 NOTE — ED NOTES
[facility]   ED Nurse to Floor Handoff     Debi Morton is a 86 year old female who speaks Data Unavailable and lives with others,  in an assisted living  They arrived in the ED by ambulance from home    ED Chief Complaint: Hip Pain    ED Dx;   Final diagnoses:   None         Needed?: No    Allergies:   Allergies   Allergen Reactions     Penicillins Unknown   .  Past Medical Hx:   Past Medical History:   Diagnosis Date     Dementia (H)      Gastroesophageal reflux disease      Hypercalcemia      Hypertension      Lipidemia      Osteoporosis      Venous insufficiency      Vitamin D deficiency       Baseline Mental status: mild dementia  Current Mental Status changes: at basesline    Infection present or suspected this encounter: no  Sepsis suspected: No  Isolation type: No active isolations  Patient tested for COVID 19 prior to admission: NO     Activity level - Baseline/Home:  Independent  Activity Level - Current: total care    Bariatric equipment needed?: No    In the ED these meds were given:   Medications   HYDROmorphone (PF) (DILAUDID) injection 0.5 mg (0.5 mg Intravenous Given 6/25/21 1007)   ondansetron (ZOFRAN) injection 4 mg (has no administration in time range)   sodium chloride 0.9% infusion (has no administration in time range)       Drips running?  Yes    Home pump  No    Current LDAs  Peripheral IV 06/25/21 Anterior;Right Upper forearm (Active)   Site Assessment WDL 06/25/21 0902   Line Status Saline locked 06/25/21 0902   Phlebitis Scale 0-->no symptoms 06/25/21 0902   Infiltration Scale 0 06/25/21 0902   Number of days: 0       Labs results:   Labs Ordered and Resulted from Time of ED Arrival Up to the Time of Departure from the ED   BASIC METABOLIC PANEL   CBC WITH PLATELETS DIFFERENTIAL   INR   UA MACROSCOPIC WITH REFLEX TO MICRO AND CULTURE   SARS-COV-2 (COVID-19) VIRUS RT-PCR   PERIPHERAL IV CATHETER   ABO/RH TYPE AND SCREEN       Imaging  "Studies: No results found for this or any previous visit (from the past 24 hour(s)).    Recent vital signs:   BP (!) 141/67   Pulse 73   Temp 98.1  F (36.7  C) (Oral)   Resp 16   Ht 1.676 m (5' 6\")   SpO2 98%     Houston Coma Scale Score: 14 (06/25/21 0908)       Cardiac Rhythm: Other  Pt needs tele? No  Skin/wound Issues: None    Code Status: DNR    Pain control: fair    Nausea control: pt had none    Abnormal labs/tests/findings requiring intervention: none  Family present during ED course? Yes   Family Comments/Social Situation comments: Patient lives in an assisted living facility due to mild demential and short term memory loss. A son and a daughter live very close by.     Tasks needing completion: SUNDAY CHAVEZ RN  ascom --[phone] [facility]    "

## 2021-06-25 NOTE — ANESTHESIA PROCEDURE NOTES
Fascia iliaca Procedure Note  Pre-Procedure   Staff -        Anesthesiologist:  Raul Haley MD       Resident/Fellow: Cedric Garcia DO       Performed By: resident       Location: ED       Procedure Start/Stop Times: 6/25/2021 1:20 PM and 6/25/2021 1:35 PM       Pre-Anesthestic Checklist: patient identified, IV checked, site marked, risks and benefits discussed, informed consent, monitors and equipment checked, pre-op evaluation, at physician/surgeon's request and post-op pain management  Timeout:       Correct Patient: Yes        Correct Procedure: Yes        Correct Site: Yes        Correct Position: Yes        Correct Laterality: Yes        Site Marked: Yes  Procedure Documentation  Procedure: Fascia iliaca       Diagnosis: ANALGESIA       Laterality: left       Patient Position: supine       Patient Prep/Sterile Barriers: sterile gloves, mask, patient draped       Skin prep: Chloraprep       Local skin infiltrated with 3 mL of 2% lidocaine.        Needle Type: Touhy needle       Needle Gauge: 17.        Needle Length (Inches): 3.13        Catheter: 19 G.         Catheter threaded easily.         Threaded 19 cm at skin.         Ultrasound guided       1. Ultrasound was used to identify targeted nerve, plexus, vascular marker, or fascial plane and place a needle adjacent to it in real-time.       2. Ultrasound was used to visualize the spread of anesthetic in close proximity to the above referenced structure.    Assessment/Narrative         The placement was negative for: blood aspirated, painful injection and site bleeding       Paresthesias: No.       Bolus given via catheter. No blood aspirated via catheter.        Secured via Tegaderm and Dermabond.        Insertion/Infusion Method: Continuous Infusion       Complications: none

## 2021-06-25 NOTE — ED PROVIDER NOTES
"    [place] EMERGENCY DEPARTMENT ([facility])  6/25/21  History     Chief Complaint   Patient presents with     Hip Pain     The history is provided by the patient and medical records.     Debi Morton is a 86 year old female with a past medical history significant for dementia, hypertension, osteoporosis, and vitamin D deficiency who presents to the Emergency Department for evaluation of left hip pain.  History at this time is limited due to the patient's baseline dementia.  Per EMS, patient was found on the floor of her bedroom this morning.  She reports that she was attempting to get out of bed this morning when she hung her legs off the bed, and in an attempt to get up, she subsequently fell onto the floor with immediate left-hip pain.  She cannot recall hearing any \"popping\" when she fell.  She denies hitting her head during this fall.  No neck pain, or back pain.  No headache, nausea, or vomiting.  She denies history of hip replacements.  She denies any other injuries after this fall.  No chest pains, or abdominal pain.  No recent cough, fever, or other URI symptoms.  No dysuria, or recent change in urinary habits.  Patient reports her last meal was yesterday evening.      I have reviewed the Medications, Allergies, Past Medical and Surgical History, and Social History in the [employer] system.  PAST MEDICAL HISTORY:   Past Medical History:   Diagnosis Date     Dementia (H)      Gastroesophageal reflux disease      Hypercalcemia      Hypertension      Lipidemia      Osteoporosis      Venous insufficiency      Vitamin D deficiency        PAST SURGICAL HISTORY: History reviewed. No pertinent surgical history.    Past medical history, past surgical history, medications, and allergies were reviewed with the patient. Additional pertinent items: None    FAMILY HISTORY: History reviewed. No pertinent family history.    SOCIAL HISTORY:   Social History     Tobacco Use     Smoking status: Unknown If Ever Smoked " "  Substance Use Topics     Alcohol use: Not Currently     Social history was reviewed with the patient. Additional pertinent items: None      Patient's Medications   New Prescriptions    No medications on file   Previous Medications    ALENDRONATE (FOSAMAX) 70 MG TABLET    Take 70 mg by mouth every 7 days    ATORVASTATIN (LIPITOR) 10 MG TABLET    Take 10 mg by mouth daily    CALCIUM CARBONATE (TUMS) 500 MG CHEWABLE TABLET    Take 1 chew tab by mouth daily    CHOLECALCIFEROL (VITAMIN D3) 1.25 MG (87362 UT) TABS    Take 50,000 Units by mouth once a week    CYANOCOBALAMIN (VITAMIN B-12) 100 MCG TABLET    Take 100 mcg by mouth daily    DONEPEZIL (ARICEPT) 10 MG TABLET    Take 10 mg by mouth At Bedtime    HYDROCHLOROTHIAZIDE (MICROZIDE) 12.5 MG CAPSULE    Take 25 mg by mouth every other day    MULTIPLE VITAMINS-MINERALS (PRESERVISION/LUTEIN PO)    Take 1 tablet by mouth daily    QUINAPRIL (ACCUPRIL) 40 MG TABLET    Take 40 mg by mouth daily   Modified Medications    No medications on file   Discontinued Medications    No medications on file          Allergies   Allergen Reactions     Penicillins Unknown        Review of Systems   Unable to perform ROS: Dementia   Constitutional: Negative for fever.   Respiratory: Negative for cough and shortness of breath.    Cardiovascular: Negative for chest pain.   Gastrointestinal: Negative for abdominal pain, nausea and vomiting.   Genitourinary: Negative for dysuria.   Musculoskeletal: Positive for arthralgias and gait problem. Negative for back pain, neck pain and neck stiffness.        Left-hip pain   Skin: Negative for wound.   Neurological: Negative for numbness and headaches.       Physical Exam   BP: 135/71  Pulse: 71  Temp: 98.1  F (36.7  C)  Resp: 16  Height: 167.6 cm (5' 6\")  SpO2: 98 %      Physical Exam  Vitals signs and nursing note reviewed.   Constitutional:       General: She is not in acute distress.     Appearance: Normal appearance. She is well-developed. She is " not ill-appearing or diaphoretic.   HENT:      Head: Normocephalic and atraumatic. No raccoon eyes, abrasion, contusion or laceration.      Jaw: No trismus.      Nose: Nose normal.      Mouth/Throat:      Mouth: No lacerations.      Dentition: Normal dentition.   Eyes:      General: No scleral icterus.     Conjunctiva/sclera: Conjunctivae normal.   Neck:      Musculoskeletal: Normal range of motion and neck supple. Normal range of motion. No neck rigidity, crepitus, injury, pain with movement, spinous process tenderness or muscular tenderness.   Cardiovascular:      Rate and Rhythm: Normal rate.   Pulmonary:      Effort: Pulmonary effort is normal. No respiratory distress.      Breath sounds: No stridor.   Chest:      Chest wall: No tenderness.   Abdominal:      General: There is no distension.      Palpations: Abdomen is soft.      Tenderness: There is no abdominal tenderness. There is no guarding or rebound.   Musculoskeletal:         General: Tenderness and signs of injury present. No swelling.      Left hip: She exhibits decreased range of motion, tenderness, bony tenderness and deformity. She exhibits no crepitus.        Legs:       Comments: Tender over lateral left hip. LLE foreshortened and held in external rotation. Unable to internally rotate. Can wiggle toes. Distally well perfused with brisk pedal pulses. Normal distal sensation. Pelvis stable    Skin:     General: Skin is warm and dry.      Coloration: Skin is not jaundiced or pale.      Findings: No bruising, lesion or rash.   Neurological:      General: No focal deficit present.      Mental Status: She is alert and oriented to person, place, and time.      Cranial Nerves: No dysarthria or facial asymmetry.      Motor: No tremor or seizure activity.   Psychiatric:         Attention and Perception: Attention normal.         Mood and Affect: Mood and affect normal.         Speech: Speech normal.         Behavior: Behavior normal. Behavior is cooperative.          Cognition and Memory: Memory is impaired.         ED Course   9:34 AM  The patient was seen and examined by Jud Santos MD in Room ED18.        Procedures         EKG Interpretation:      Interpreted by Jud Santos MD  Time reviewed: 958  Symptoms at time of EKG: Fall   Rhythm: normal sinus   Rate: Normal  Axis: Normal  Ectopy: premature atrial contraction  Conduction: normal  ST Segments/ T Waves: No acute ischemic changes and Non-specific ST-T wave changes  Q Waves: septal  Comparison to prior: No old EKG available    Clinical Impression: no acute changes and non-specific EKG                   Critical Care Addendum    My initial assessment, based on my review of prehospital provider report, review of nursing observations, review of vital signs, focused history, physical exam and 12 lead ECG analysis, established that Debi Morton has severe traumatic injuries and altered mental status, which requires immediate intervention, and therefore she is critically ill.     After the initial assessment, the care team initiated multiple lab tests and consulted with Trauma, Ortho to provide stabilization care. Due to the critical nature of this patient, I reassessed nursing observations, vital signs, physical exam and mental status multiple times prior to her disposition.     Time also spent performing documentation, discussion with family to obtain medical information for decision making, reviewing test results, discussion with consultants and coordination of care.     Critical care time (excluding teaching time and procedures): 30 minutes.      Trauma:  Level of trauma activation: Trauma evaluation (consult) called   C-collar and immobilization: not indicated, cleared.  CSpine Clearance: by Nexus Criteria  GCS at arrival: 15  GCS at disposition: unchanged  Full Primary and Secondary survey with appropriate immobilization of spine completed in exam section.  Consults prior to admission or transfer:  Orthopedics   Procedures done in the ED: none              Results for orders placed or performed during the hospital encounter of 06/25/21 (from the past 24 hour(s))   EKG 12-lead, tracing only   Result Value Ref Range    Interpretation ECG Click View Image link to view waveform and result    Basic metabolic panel   Result Value Ref Range    Sodium 138 133 - 144 mmol/L    Potassium 3.3 (L) 3.4 - 5.3 mmol/L    Chloride 103 94 - 109 mmol/L    Carbon Dioxide 28 20 - 32 mmol/L    Anion Gap 7 3 - 14 mmol/L    Glucose 106 (H) 70 - 99 mg/dL    Urea Nitrogen 29 7 - 30 mg/dL    Creatinine 0.75 0.52 - 1.04 mg/dL    GFR Estimate 72 >60 mL/min/[1.73_m2]    GFR Estimate If Black 84 >60 mL/min/[1.73_m2]    Calcium 9.3 8.5 - 10.1 mg/dL   ABO/Rh type and screen   Result Value Ref Range    ABO O     RH(D) Pos     Antibody Screen Neg     Test Valid Only At          [facility]    Specimen Expires 06/28/2021    INR   Result Value Ref Range    INR 1.03 0.86 - 1.14   Asymptomatic SARS-CoV-2 COVID-19 Virus (Coronavirus) by PCR    Specimen: Nasopharyngeal   Result Value Ref Range    SARS-CoV-2 Virus Specimen Source Nasopharyngeal     SARS-CoV-2 PCR Result NEGATIVE     SARS-CoV-2 PCR Comment       Testing was performed using the Xpert Xpress SARS-CoV-2 Assay on the Cepheid Gene-Xpert   Instrument Systems. Additional information about this Emergency Use Authorization (EUA)   assay can be found via the Lab Guide.     XR Pelvis and Hip Left 2 Views    Narrative    1 view pelvis on the left hip radiographs 6/25/2021 11:10 AM    History: fall, deformity    Comparison: None available.    Findings:    AP view of pelvis, 3 crosstable lateral views of the left hip were  obtained.     Communicated left intertrochanteric fracture with coxa varus  alignment.    Degenerative changes of bilateral hips with associated  mild-to-moderate joint space loss.     Others:    Enthesopathic changes of pelvis and  trochanters. Degenerative changes  of the visualized lumbar spine and pubic symphysis.    Vascular calcifications. Bones appear osteopenic.      Impression    Impression: Communicated left intertrochanteric fracture with coxa  varus alignment.    SWAPNA HEBERT   CBC with platelets differential   Result Value Ref Range    WBC 15.9 (H) 4.0 - 11.0 10e9/L    RBC Count 3.50 (L) 3.8 - 5.2 10e12/L    Hemoglobin 10.7 (L) 11.7 - 15.7 g/dL    Hematocrit 32.8 (L) 35.0 - 47.0 %    MCV 94 78 - 100 fl    MCH 30.6 26.5 - 33.0 pg    MCHC 32.6 31.5 - 36.5 g/dL    RDW 14.3 10.0 - 15.0 %    Platelet Count 664 (H) 150 - 450 10e9/L    Diff Method Automated Method     % Neutrophils 90.2 %    % Lymphocytes 2.0 %    % Monocytes 6.9 %    % Eosinophils 0.0 %    % Basophils 0.1 %    % Immature Granulocytes 0.8 %    Nucleated RBCs 0 0 /100    Absolute Neutrophil 14.4 (H) 1.6 - 8.3 10e9/L    Absolute Lymphocytes 0.3 (L) 0.8 - 5.3 10e9/L    Absolute Monocytes 1.1 0.0 - 1.3 10e9/L    Absolute Eosinophils 0.0 0.0 - 0.7 10e9/L    Absolute Basophils 0.0 0.0 - 0.2 10e9/L    Abs Immature Granulocytes 0.1 0 - 0.4 10e9/L    Absolute Nucleated RBC 0.0    XR Femur Left 2 Views    Narrative    EXAM: XR FEMUR LEFT 2 VIEW  6/25/2021 12:37 PM      HISTORY: left hip fracture, assess distal femur    COMPARISON: None    FINDINGS: AP and lateral views of the mid through distal left femur.    No acute osseous abnormality. Degenerative changes of the left knee,  especially medial compartments. Vascular calcifications.       Impression    IMPRESSION: No acute osseous abnormality.         FREEDOM CORREA MD (Joe)     Medications   ondansetron (ZOFRAN) injection 4 mg (has no administration in time range)   sodium chloride 0.9% infusion ( Intravenous New Bag 6/25/21 1109)   acetaminophen (TYLENOL) tablet 975 mg (975 mg Oral Given 6/25/21 1433)   methocarbamol (ROBAXIN) tablet 500 mg (500 mg Oral Given 6/25/21 4495)   oxyCODONE IR (ROXICODONE) half-tab 2.5 mg  (has no administration in time range)   HYDROmorphone (DILAUDID) injection 0.2 mg (has no administration in time range)   atorvastatin (LIPITOR) tablet 10 mg (10 mg Oral Given 6/25/21 1437)   calcium carbonate (TUMS) chewable tablet 500 mg (500 mg Oral Given 6/25/21 1435)   Vitamin D3 TABS 50,000 Units (has no administration in time range)   cyanocobalamin (VITAMIN B-12) tablet 100 mcg (100 mcg Oral Given 6/25/21 1437)   donepezil (ARICEPT) tablet 10 mg (has no administration in time range)   hydrochlorothiazide (HYDRODIURIL) tablet 25 mg (25 mg Oral Given 6/25/21 1435)   bupivacaine 0.125 % in NaCl 0.9%, ON-Q C-Bloc select flow ([id number] holds 600-750 mL) single cath disposable pump 1 Device (has no administration in time range)             Assessments & Plan (with Medical Decision Making)   Debi Morton is a 86 year old female with a past medical history significant for dementia, hypertension, osteoporosis, and vitamin D deficiency who presents to the Emergency Department for evaluation of left hip pain.     Ddx: hip fx, fall, syncope, dementia, UTI     Patient does not report a fall or known traumatic mechanism. However her description of events is imprecise and inconsistent making it seem like she does not actually remember what happened. No e/o head injury on exam. Obvious left hip deformity with external rotation and foreshortening. C/o pain at hip. Given dilaudid PRN for pain. Made NPO. Xrays ordered and Trauma and Ortho consulted. Preop Labs and EKG obtained.     X-ray shows left comminuted intertrochanteric fracture with coxa varus alignment.  White count elevated to 15.9 platelets 664, hemoglobin 10.7.  Potassium 3.3.  Creatinine 0.75.  No obvious signs or symptoms to suggest acute infection.  May be a stress reaction.  We will continue to monitor.  Patient evaluated by trauma surgery and orthopedics.  They plan to admit patient to medical service on the [facility] for operative intervention tomorrow  morning.  Handoff was provided to the triage hospitalist.  Patient placement states there will likely be beds available on [facility] after the evening shift.  In the meantime, trauma plans to arrange for patient to get a local block for pain control.    I have reviewed the nursing notes.    I have reviewed the findings, diagnosis, plan and need for follow up with the patient.    New Prescriptions    No medications on file       Final diagnoses:   Closed fracture of left hip, initial encounter (H)   I, Vikas Wilkinson, am serving as a trained medical scribe to document services personally performed by Jud Santos MD, based on the provider's statements to me.      IJud MD, was physically present and have reviewed and verified the accuracy of this note documented by Vikas Wilkinson.      6/25/2021   [facility] EMERGENCY DEPARTMENT     Jud Santos MD  06/25/21 1442       Jud Santos MD  06/25/21 1443

## 2021-06-25 NOTE — LETTER
Health Information Management Services               Recipient: [facility]          Sender: Rafaela Finn, , ph: [phone]          Date: July 2, 2021  Patient Name:  eDbi Morton  Routing Message:  Referral for TCU.  The pt is medically ready for discharge.  She has [facility] for insurance. If you can take her over the weekend, please call [phone] and asked to speak to the  on-call. Thank you for reviewing!          The documents accompanying this notice contain confidential information belonging to the sender.  This information is intended only for the use of the individual or entity named above.  The authorized recipient of this information is prohibited from disclosing this information to any other party and is required to destroy the information after its stated need has been fulfilled, unless otherwise required by state law.      If you are not the intended recipient, you are hereby notified that any disclosure, copy, distribution or action taken in reliance on the contents of these documents is strictly prohibited.  If you have received this document in error, please return it by fax to [phone] with a note on the cover sheet explaining why you are returning it (e.g. not your patient, not your provider, etc.).  If you need further assistance, please call [facility] Centralized Transcription at [phone].  Documents may also be returned by mail to [employer], , [facility] [address], [place], [place] [address].

## 2021-06-25 NOTE — LETTER
Health Information Management Services               Recipient:  [facility]          Sender: Rafaela Briseno, , ph; [phone]          Date: July 6, 2021  Patient Name:  Debi Morton  Routing Message:  Referral for TCU, thanks!          The documents accompanying this notice contain confidential information belonging to the sender.  This information is intended only for the use of the individual or entity named above.  The authorized recipient of this information is prohibited from disclosing this information to any other party and is required to destroy the information after its stated need has been fulfilled, unless otherwise required by state law.      If you are not the intended recipient, you are hereby notified that any disclosure, copy, distribution or action taken in reliance on the contents of these documents is strictly prohibited.  If you have received this document in error, please return it by fax to [phone] with a note on the cover sheet explaining why you are returning it (e.g. not your patient, not your provider, etc.).  If you need further assistance, please call [facility] Centralized Transcription at [phone].  Documents may also be returned by mail to [employer], , [facility] [address], [place], [place] [address].

## 2021-06-25 NOTE — LETTER
Health Information Management Services               Recipient: [facility]          Sender: Rafaela Briseno, , ph: [phone]          Date: July 6, 2021  Patient Name:  Debi Morton  Routing Message:  Referral for TCU - patient is medically ready for discharge. She has [employer] for insurance, hope you are in-network! Thanks!          The documents accompanying this notice contain confidential information belonging to the sender.  This information is intended only for the use of the individual or entity named above.  The authorized recipient of this information is prohibited from disclosing this information to any other party and is required to destroy the information after its stated need has been fulfilled, unless otherwise required by state law.      If you are not the intended recipient, you are hereby notified that any disclosure, copy, distribution or action taken in reliance on the contents of these documents is strictly prohibited.  If you have received this document in error, please return it by fax to [phone] with a note on the cover sheet explaining why you are returning it (e.g. not your patient, not your provider, etc.).  If you need further assistance, please call [facility] Centralized Transcription at [phone].  Documents may also be returned by mail to [employer], , [address], [place], [place] [address].

## 2021-06-25 NOTE — LETTER
Health Information Management Services               Recipient: [facility]          Sender: Rafaela Finn, , ph: [phone]          Date: July 8, 2021  Patient Name:  Debi Morton  Routing Message:  Discharge orders! [id number]. Thanks!          The documents accompanying this notice contain confidential information belonging to the sender.  This information is intended only for the use of the individual or entity named above.  The authorized recipient of this information is prohibited from disclosing this information to any other party and is required to destroy the information after its stated need has been fulfilled, unless otherwise required by state law.      If you are not the intended recipient, you are hereby notified that any disclosure, copy, distribution or action taken in reliance on the contents of these documents is strictly prohibited.  If you have received this document in error, please return it by fax to [phone] with a note on the cover sheet explaining why you are returning it (e.g. not your patient, not your provider, etc.).  If you need further assistance, please call [place] [facility] Centralized Transcription at [phone].  Documents may also be returned by mail to [employer], , [facility] [address], [place], [place] [address].

## 2021-06-25 NOTE — H&P
[facility]    History and Physical - Hospitalist Service       Date of Admission:  6/25/2021    Assessment & Plan      Debi Morton is a 86 year old female admitted on 6/25/2021 for evaluation of left intra trochanteric fracture. She had a fall. No report or syncope or LOC. She has a PMHx significant for HTN, Osteoporosis, Dementia and Dyslipidemia. Ortho evaluated the patient and is planning to take the patient to the OR tomorrow.     #Left intratrochanteric fracture  -Ortho following the patient.   -NPO after midnight.   -EKG showed sinus rhythm, no acute ischemic changes. She is medically optimized for surgery tomorrow.   -Gentle hydration.   -Pain control.     #Leucocytosis   -Most likely reactive. No signs of infection. UA negative for UTI.   -Continue trending.     #Dementia   -Sitter at bedside.   -Continue on PTA Aricept    #Acute urinary retention   -Bladder scan showed > 500 ml.   -Lizarraga cath     #HTN   #Dyslipimdemia   -Continue on hydrochlorothiazide with holding parameters.   -Continue on PTA Lipitor.     #Osteoporosis   -Continue on Vitamin D        Diet: NPO per Anesthesia Guidelines for Procedure/Surgery Except for: Meds, Ice Chips  Regular Diet Adult    DVT Prophylaxis: Pneumatic Compression Devices and Anti-embolisim stockings (TEDs)  Lizarraga Catheter: Not present  Central Lines: None  Code Status:  Full code       Disposition Plan   Expected discharge:TBD      The patient's care was discussed with the Patient.    Anthony Hernandez MD  [facility]  Securely message with the [employer] Web Console (learn more here)  Text page via [employer] Paging/Directory      ______________________________________________________________________    Chief Complaint   Fall   Hip pain     History is obtained from the patient    History of Present Illness   Debi Morton is a 86 year old female who presented to the  [facility] for evaluation after fall.   PMHx significant for dementia, hypertension, osteoporosis, and vitamin D deficiency who presents to the Emergency Department for evaluation of left hip pain.   Per EMS, patient was found on the floor of her bedroom this morning.  She reports that she was attempting to get out of bed this morning when she hung her legs off the bed, and in an attempt to get up, she subsequently fell onto the floor with immediate left-hip pain. She denies hitting her head during this fall or LOC. No neck pain, or back pain. No headache, nausea, or vomiting. She denies any other injuries after this fall. No chest pains, palpitations or abdominal pain. No recent cough, fever, or other URI symptoms.      Review of Systems    The 10 point Review of Systems is negative other than noted in the HPI or here. Other ROS negative.     Past Medical History    I have reviewed this patient's medical history and updated it with pertinent information if needed.   Past Medical History:   Diagnosis Date     Dementia (H)      Gastroesophageal reflux disease      Hypercalcemia      Hypertension      Lipidemia      Osteoporosis      Venous insufficiency      Vitamin D deficiency        Past Surgical History   I have reviewed this patient's surgical history and updated it with pertinent information if needed.  History reviewed. No pertinent surgical history.    Social History   I have reviewed this patient's social history and updated it with pertinent information if needed.  Social History     Tobacco Use     Smoking status: Unknown If Ever Smoked   Substance Use Topics     Alcohol use: Not Currently     Drug use: Not Currently       Family History   Patient denies any significant family medical hx.     Prior to Admission Medications   Prior to Admission Medications   Prescriptions Last Dose Informant Patient Reported? Taking?   Cholecalciferol (VITAMIN D3) 1.25 MG (53984 UT) TABS Past Week at Unknown time Self Yes Yes    Sig: Take 50,000 Units by mouth once a week   Multiple Vitamins-Minerals (PRESERVISION/LUTEIN PO) Past Week at Unknown time Self Yes Yes   Sig: Take 1 tablet by mouth daily   alendronate (FOSAMAX) 70 MG tablet Past Week at Unknown time Self Yes Yes   Sig: Take 70 mg by mouth every 7 days   atorvastatin (LIPITOR) 10 MG tablet Past Week at Unknown time Self Yes Yes   Sig: Take 10 mg by mouth daily   calcium carbonate (TUMS) 500 MG chewable tablet Past Week at Unknown time Self Yes Yes   Sig: Take 1 chew tab by mouth daily   cyanocobalamin (VITAMIN B-12) 100 MCG tablet Past Week at Unknown time Self Yes Yes   Sig: Take 100 mcg by mouth daily   donepezil (ARICEPT) 10 MG tablet Past Week at Unknown time Self Yes Yes   Sig: Take 10 mg by mouth At Bedtime   hydrochlorothiazide (MICROZIDE) 12.5 MG capsule Past Week at Unknown time Self Yes Yes   Sig: Take 25 mg by mouth every other day   quinapril (ACCUPRIL) 40 MG tablet Past Week at Unknown time Self Yes Yes   Sig: Take 40 mg by mouth daily      Facility-Administered Medications: None     Allergies   Allergies   Allergen Reactions     Penicillins Unknown       Physical Exam   Vital Signs: Temp: 98.3  F (36.8  C) Temp src: Oral BP: 131/56 Pulse: 59   Resp: 16 SpO2: 97 % O2 Device: None (Room air)    Weight: 0 lbs 0 oz    General Appearance: Alert, no acute distress, on room air.   Eyes: Pupils equal and reactive to light.   HEENT: Oral mucosa moist.   Respiratory: Normal respiratory effort, auscultated anteriorly, clear lungs, no crackles or wheezing.   Cardiovascular: RRR, no murmur.   GI: Non-distended, + BS, soft, no tenderness to palpation.   Lymph/Hematologic: No lymphadenopathy.   Genitourinary: + gutierrez cath   Skin: No rash.   Musculoskeletal: Left leg externally rotated. No peripheral edema.   Neurologic: Alert, pleasantly confused, speech is normal, no facial droop.   Psychiatric: Mood stable.     Data   Data reviewed today: I reviewed all medications, new labs  and imaging results over the last 24 hours. I personally reviewed no images or EKG's today.    Recent Labs   Lab 06/25/21  1156 06/25/21  1005   WBC 15.9*  --    HGB 10.7*  --    MCV 94  --    *  --    INR  --  1.03   NA  --  138   POTASSIUM  --  3.3*   CHLORIDE  --  103   CO2  --  28   BUN  --  29   CR  --  0.75   ANIONGAP  --  7   TAY  --  9.3   GLC  --  106*

## 2021-06-25 NOTE — ED NOTES
Bed: ED18  Expected date: 6/25/21  Expected time: 8:39 AM  Means of arrival: Ambulance  Comments:  [facility] [place] [facility] with a 87 yo F reports of fall, hip injury. Triaged yellow in 6 mins

## 2021-06-26 ENCOUNTER — APPOINTMENT (OUTPATIENT)
Dept: GENERAL RADIOLOGY | Facility: CLINIC | Age: 86
DRG: 482 | End: 2021-06-26
Attending: ORTHOPAEDIC SURGERY
Payer: COMMERCIAL

## 2021-06-26 ENCOUNTER — ANESTHESIA (OUTPATIENT)
Dept: SURGERY | Facility: CLINIC | Age: 86
DRG: 482 | End: 2021-06-26
Payer: COMMERCIAL

## 2021-06-26 ENCOUNTER — ANESTHESIA EVENT (OUTPATIENT)
Dept: SURGERY | Facility: CLINIC | Age: 86
DRG: 482 | End: 2021-06-26
Payer: COMMERCIAL

## 2021-06-26 LAB
GLUCOSE BLDC GLUCOMTR-MCNC: 89 MG/DL (ref 70–99)
INTERPRETATION ECG - MUSE: NORMAL

## 2021-06-26 PROCEDURE — 120N000002 HC R&B MED SURG/OB UMMC

## 2021-06-26 PROCEDURE — 999N001017 HC STATISTIC GLUCOSE BY METER IP

## 2021-06-26 PROCEDURE — 0QS706Z REPOSITION LEFT UPPER FEMUR WITH INTRAMEDULLARY INTERNAL FIXATION DEVICE, OPEN APPROACH: ICD-10-PCS | Performed by: ORTHOPAEDIC SURGERY

## 2021-06-26 PROCEDURE — 250N000009 HC RX 250: Performed by: NURSE ANESTHETIST, CERTIFIED REGISTERED

## 2021-06-26 PROCEDURE — 258N000003 HC RX IP 258 OP 636: Performed by: EMERGENCY MEDICINE

## 2021-06-26 PROCEDURE — 710N000010 HC RECOVERY PHASE 1, LEVEL 2, PER MIN: Performed by: ORTHOPAEDIC SURGERY

## 2021-06-26 PROCEDURE — 99207 PR CDG-CUT & PASTE-POTENTIAL IMPACT ON LEVEL: CPT | Performed by: HOSPITALIST

## 2021-06-26 PROCEDURE — 99232 SBSQ HOSP IP/OBS MODERATE 35: CPT | Performed by: HOSPITALIST

## 2021-06-26 PROCEDURE — 272N000002 HC OR SUPPLY OTHER OPNP: Performed by: ORTHOPAEDIC SURGERY

## 2021-06-26 PROCEDURE — 250N000011 HC RX IP 250 OP 636: Performed by: NURSE ANESTHETIST, CERTIFIED REGISTERED

## 2021-06-26 PROCEDURE — 250N000009 HC RX 250

## 2021-06-26 PROCEDURE — 360N000084 HC SURGERY LEVEL 4 W/ FLUORO, PER MIN: Performed by: ORTHOPAEDIC SURGERY

## 2021-06-26 PROCEDURE — 250N000024 HC ISOFLURANE, PER MIN: Performed by: ORTHOPAEDIC SURGERY

## 2021-06-26 PROCEDURE — C1713 ANCHOR/SCREW BN/BN,TIS/BN: HCPCS | Performed by: ORTHOPAEDIC SURGERY

## 2021-06-26 PROCEDURE — 272N000001 HC OR GENERAL SUPPLY STERILE: Performed by: ORTHOPAEDIC SURGERY

## 2021-06-26 PROCEDURE — 258N000003 HC RX IP 258 OP 636: Performed by: NURSE ANESTHETIST, CERTIFIED REGISTERED

## 2021-06-26 PROCEDURE — 999N000141 HC STATISTIC PRE-PROCEDURE NURSING ASSESSMENT: Performed by: ORTHOPAEDIC SURGERY

## 2021-06-26 PROCEDURE — 370N000017 HC ANESTHESIA TECHNICAL FEE, PER MIN: Performed by: ORTHOPAEDIC SURGERY

## 2021-06-26 PROCEDURE — 250N000013 HC RX MED GY IP 250 OP 250 PS 637: Performed by: PHYSICIAN ASSISTANT

## 2021-06-26 PROCEDURE — 999N000180 XR SURGERY CARM FLUORO LESS THAN 5 MIN: Mod: TC

## 2021-06-26 PROCEDURE — 250N000013 HC RX MED GY IP 250 OP 250 PS 637: Performed by: HOSPITALIST

## 2021-06-26 DEVICE — LAG SCREW, RECON
Type: IMPLANTABLE DEVICE | Site: FEMUR | Status: FUNCTIONAL
Brand: T2

## 2021-06-26 RX ORDER — CLINDAMYCIN PHOSPHATE 900 MG/50ML
900 INJECTION, SOLUTION INTRAVENOUS EVERY 8 HOURS
Status: DISCONTINUED | OUTPATIENT
Start: 2021-06-26 | End: 2021-06-26

## 2021-06-26 RX ORDER — EPHEDRINE SULFATE 50 MG/ML
INJECTION, SOLUTION INTRAMUSCULAR; INTRAVENOUS; SUBCUTANEOUS PRN
Status: DISCONTINUED | OUTPATIENT
Start: 2021-06-26 | End: 2021-06-26

## 2021-06-26 RX ORDER — ONDANSETRON 2 MG/ML
4 INJECTION INTRAMUSCULAR; INTRAVENOUS EVERY 30 MIN PRN
Status: DISCONTINUED | OUTPATIENT
Start: 2021-06-26 | End: 2021-06-26 | Stop reason: HOSPADM

## 2021-06-26 RX ORDER — LIDOCAINE HYDROCHLORIDE 20 MG/ML
INJECTION, SOLUTION INFILTRATION; PERINEURAL PRN
Status: DISCONTINUED | OUTPATIENT
Start: 2021-06-26 | End: 2021-06-26

## 2021-06-26 RX ORDER — FENTANYL CITRATE 50 UG/ML
INJECTION, SOLUTION INTRAMUSCULAR; INTRAVENOUS PRN
Status: DISCONTINUED | OUTPATIENT
Start: 2021-06-26 | End: 2021-06-26

## 2021-06-26 RX ORDER — NALOXONE HYDROCHLORIDE 0.4 MG/ML
0.4 INJECTION, SOLUTION INTRAMUSCULAR; INTRAVENOUS; SUBCUTANEOUS
Status: DISCONTINUED | OUTPATIENT
Start: 2021-06-26 | End: 2021-01-01 | Stop reason: HOSPADM

## 2021-06-26 RX ORDER — NALOXONE HYDROCHLORIDE 0.4 MG/ML
0.2 INJECTION, SOLUTION INTRAMUSCULAR; INTRAVENOUS; SUBCUTANEOUS
Status: DISCONTINUED | OUTPATIENT
Start: 2021-06-26 | End: 2021-01-01 | Stop reason: HOSPADM

## 2021-06-26 RX ORDER — PROPOFOL 10 MG/ML
INJECTION, EMULSION INTRAVENOUS CONTINUOUS PRN
Status: DISCONTINUED | OUTPATIENT
Start: 2021-06-26 | End: 2021-06-26

## 2021-06-26 RX ORDER — FENTANYL CITRATE 50 UG/ML
25-50 INJECTION, SOLUTION INTRAMUSCULAR; INTRAVENOUS
Status: DISCONTINUED | OUTPATIENT
Start: 2021-06-26 | End: 2021-06-26 | Stop reason: HOSPADM

## 2021-06-26 RX ORDER — ONDANSETRON 2 MG/ML
INJECTION INTRAMUSCULAR; INTRAVENOUS PRN
Status: DISCONTINUED | OUTPATIENT
Start: 2021-06-26 | End: 2021-06-26

## 2021-06-26 RX ORDER — SODIUM CHLORIDE, SODIUM LACTATE, POTASSIUM CHLORIDE, CALCIUM CHLORIDE 600; 310; 30; 20 MG/100ML; MG/100ML; MG/100ML; MG/100ML
INJECTION, SOLUTION INTRAVENOUS CONTINUOUS PRN
Status: DISCONTINUED | OUTPATIENT
Start: 2021-06-26 | End: 2021-06-26

## 2021-06-26 RX ORDER — CEFAZOLIN SODIUM 2 G/100ML
INJECTION, SOLUTION INTRAVENOUS PRN
Status: DISCONTINUED | OUTPATIENT
Start: 2021-06-26 | End: 2021-06-26

## 2021-06-26 RX ORDER — DEXAMETHASONE SODIUM PHOSPHATE 4 MG/ML
INJECTION, SOLUTION INTRA-ARTICULAR; INTRALESIONAL; INTRAMUSCULAR; INTRAVENOUS; SOFT TISSUE PRN
Status: DISCONTINUED | OUTPATIENT
Start: 2021-06-26 | End: 2021-06-26

## 2021-06-26 RX ORDER — CLINDAMYCIN PHOSPHATE 900 MG/50ML
900 INJECTION, SOLUTION INTRAVENOUS EVERY 8 HOURS
Status: COMPLETED | OUTPATIENT
Start: 2021-06-26 | End: 2021-06-27

## 2021-06-26 RX ORDER — ONDANSETRON 4 MG/1
4 TABLET, ORALLY DISINTEGRATING ORAL EVERY 30 MIN PRN
Status: DISCONTINUED | OUTPATIENT
Start: 2021-06-26 | End: 2021-06-26 | Stop reason: HOSPADM

## 2021-06-26 RX ORDER — SODIUM CHLORIDE, SODIUM LACTATE, POTASSIUM CHLORIDE, CALCIUM CHLORIDE 600; 310; 30; 20 MG/100ML; MG/100ML; MG/100ML; MG/100ML
INJECTION, SOLUTION INTRAVENOUS CONTINUOUS
Status: DISCONTINUED | OUTPATIENT
Start: 2021-06-26 | End: 2021-06-26 | Stop reason: HOSPADM

## 2021-06-26 RX ORDER — POTASSIUM CHLORIDE 750 MG/1
20 TABLET, EXTENDED RELEASE ORAL ONCE
Status: COMPLETED | OUTPATIENT
Start: 2021-06-26 | End: 2021-06-26

## 2021-06-26 RX ORDER — PROPOFOL 10 MG/ML
INJECTION, EMULSION INTRAVENOUS PRN
Status: DISCONTINUED | OUTPATIENT
Start: 2021-06-26 | End: 2021-06-26

## 2021-06-26 RX ADMIN — PHENYLEPHRINE HYDROCHLORIDE 100 MCG: 10 INJECTION INTRAVENOUS at 08:46

## 2021-06-26 RX ADMIN — PROPOFOL 130 MG: 10 INJECTION, EMULSION INTRAVENOUS at 08:36

## 2021-06-26 RX ADMIN — Medication 5 MG: at 08:51

## 2021-06-26 RX ADMIN — ROCURONIUM BROMIDE 20 MG: 10 INJECTION INTRAVENOUS at 09:31

## 2021-06-26 RX ADMIN — CLINDAMYCIN PHOSPHATE 900 MG: 900 INJECTION, SOLUTION INTRAVENOUS at 16:18

## 2021-06-26 RX ADMIN — ACETAMINOPHEN 975 MG: 325 TABLET, FILM COATED ORAL at 19:20

## 2021-06-26 RX ADMIN — DEXAMETHASONE SODIUM PHOSPHATE 8 MG: 4 INJECTION, SOLUTION INTRAMUSCULAR; INTRAVENOUS at 08:41

## 2021-06-26 RX ADMIN — ROCURONIUM BROMIDE 20 MG: 10 INJECTION INTRAVENOUS at 10:17

## 2021-06-26 RX ADMIN — METHOCARBAMOL 500 MG: 500 TABLET, FILM COATED ORAL at 16:17

## 2021-06-26 RX ADMIN — Medication 5 MG: at 08:48

## 2021-06-26 RX ADMIN — LIDOCAINE HYDROCHLORIDE 60 MG: 20 INJECTION, SOLUTION INFILTRATION; PERINEURAL at 08:35

## 2021-06-26 RX ADMIN — METHOCARBAMOL 500 MG: 500 TABLET, FILM COATED ORAL at 19:20

## 2021-06-26 RX ADMIN — SODIUM CHLORIDE, SODIUM LACTATE, POTASSIUM CHLORIDE, CALCIUM CHLORIDE: 600; 310; 30; 20 INJECTION, SOLUTION INTRAVENOUS at 08:43

## 2021-06-26 RX ADMIN — PROPOFOL 30 MG: 10 INJECTION, EMULSION INTRAVENOUS at 10:54

## 2021-06-26 RX ADMIN — Medication 2 G: at 09:04

## 2021-06-26 RX ADMIN — ROCURONIUM BROMIDE 50 MG: 10 INJECTION INTRAVENOUS at 08:38

## 2021-06-26 RX ADMIN — PHENYLEPHRINE HYDROCHLORIDE 100 MCG: 10 INJECTION INTRAVENOUS at 08:51

## 2021-06-26 RX ADMIN — FENTANYL CITRATE 100 MCG: 50 INJECTION, SOLUTION INTRAMUSCULAR; INTRAVENOUS at 08:35

## 2021-06-26 RX ADMIN — HYDROMORPHONE HYDROCHLORIDE 0.25 MG: 1 INJECTION, SOLUTION INTRAMUSCULAR; INTRAVENOUS; SUBCUTANEOUS at 10:39

## 2021-06-26 RX ADMIN — SODIUM CHLORIDE: 9 INJECTION, SOLUTION INTRAVENOUS at 21:47

## 2021-06-26 RX ADMIN — PHENYLEPHRINE HYDROCHLORIDE 0.5 MCG/KG/MIN: 10 INJECTION INTRAVENOUS at 10:23

## 2021-06-26 RX ADMIN — SODIUM CHLORIDE, SODIUM LACTATE, POTASSIUM CHLORIDE, CALCIUM CHLORIDE: 600; 310; 30; 20 INJECTION, SOLUTION INTRAVENOUS at 08:46

## 2021-06-26 RX ADMIN — SODIUM CHLORIDE, SODIUM LACTATE, POTASSIUM CHLORIDE, CALCIUM CHLORIDE: 600; 310; 30; 20 INJECTION, SOLUTION INTRAVENOUS at 10:46

## 2021-06-26 RX ADMIN — HYDROMORPHONE HYDROCHLORIDE 0.25 MG: 1 INJECTION, SOLUTION INTRAMUSCULAR; INTRAVENOUS; SUBCUTANEOUS at 10:55

## 2021-06-26 RX ADMIN — POTASSIUM CHLORIDE 20 MEQ: 750 TABLET, EXTENDED RELEASE ORAL at 14:55

## 2021-06-26 RX ADMIN — PHENYLEPHRINE HYDROCHLORIDE 50 MCG: 10 INJECTION INTRAVENOUS at 08:48

## 2021-06-26 RX ADMIN — DONEPEZIL HYDROCHLORIDE 10 MG: 10 TABLET ORAL at 21:46

## 2021-06-26 RX ADMIN — ACETAMINOPHEN 975 MG: 325 TABLET, FILM COATED ORAL at 14:14

## 2021-06-26 RX ADMIN — PROPOFOL 20 MCG/KG/MIN: 10 INJECTION, EMULSION INTRAVENOUS at 09:18

## 2021-06-26 RX ADMIN — SUGAMMADEX 60 MG: 100 INJECTION, SOLUTION INTRAVENOUS at 11:22

## 2021-06-26 RX ADMIN — ONDANSETRON 4 MG: 2 INJECTION INTRAMUSCULAR; INTRAVENOUS at 10:50

## 2021-06-26 RX ADMIN — SODIUM CHLORIDE, SODIUM LACTATE, POTASSIUM CHLORIDE, CALCIUM CHLORIDE: 600; 310; 30; 20 INJECTION, SOLUTION INTRAVENOUS at 08:31

## 2021-06-26 RX ADMIN — SUGAMMADEX 140 MG: 100 INJECTION, SOLUTION INTRAVENOUS at 11:19

## 2021-06-26 ASSESSMENT — MIFFLIN-ST. JEOR: SCORE: 1182.51

## 2021-06-26 NOTE — PROGRESS NOTES
Orthopedic progress note:    Patient was transferred to [facility].    Patient seen in her room.  Patient is lying comfortably.  Patient does have a catheter at left hip for pain control.      Patient is confused, but able to participate in examination.    Physical exam:  Focused musculoskeletal exam of the left lower extremity:  Skin is intact at the left hip  No obvious wounds are noted.    Patient is not tender to palpation at the knee despite complaining of left knee pain  Patient is nontender with the range of motion of the knee  Patient can dorsiflex, plantarflex  EHL FHL intact  DP is palpable    Plan:  Plan for OR tomorrow morning with Dr. Mckenzie    N.p.o. at midnight  Remaining plan as per noted in the previous consult note    Bud Branch MD  Orthopaedic Surgery Resident, PGY4  Pager: [phone]

## 2021-06-26 NOTE — BRIEF OP NOTE
[facility]    Brief Operative Note    Pre-operative diagnosis: Fracture of left hip (H) [S72.002A]  Post-operative diagnosis Same as pre-operative diagnosis    Procedure: Procedure(s):  OPEN REDUCTION INTERNAL FIXATION, FRACTURE, FEMUR, USING INTRAMEDULLARY MICHELINE  Surgeon: Surgeon(s) and Role:     * Elizabeth Mckenzie MD - Primary     * Bud Branch MD - Resident - Assisting  Anesthesia: General   Estimated blood loss: 100 ml  Drains: None  Specimens: * No specimens in log *  Findings:   None.  Complications: None.  Implants:   Implant Name Type Inv. Item Serial No.  Lot No. LRB No. Used Action   IMPLANT RECORD       1 Implanted   11 x 400mm T2 Aplha Femoral Nail GT    LEE [id number] Left 1 Implanted   6.5 x 100mm Lag Screw, Recon    LEE [id number] Left 1 Implanted   SCREW LAG T2 RECON 6.5X90MM - [id number] Metallic Hardware/[place] SCREW LAG T2 RECON 6.5X90MM  [employer] [id number] Left 1 Implanted   5 x 42.5mm locking screw    LEE [id number] Left 1 Implanted and Explanted   5 x 45mm locking screw    LEE [id number] Left 1 Implanted       Assessment: Debi Morton is a 86 year old female s/p L hip IM nail on 6/26/21 with Dr. Mckenzie. Doing well.    Plan:  Orthopedics Primary  Activity: Up with assist.  Weight bearing status: NWB x6 weeks.  Antibiotics: clindamycin x24 hours perioperatively.  Diet: Begin with clear fluids and progress diet as tolerated.  DVT prophylaxis: lovenox in house, and 4 weeks aspirin 162mg QD and mechanical while in the hospital  Wound Care: Dressing change at bedside by Ortho on POD #3.  Pain management: transition from IV to orals as tolerated.   X-rays:  Obtain intraoperatively  Physical Therapy:  ROM, ADL's.  Occupational Therapy: ADL's.  Labs: Trend Hgb on POD #1  Consults: podiatry for toe nail trimming, Medicine for comanagement  Follow-up: Clinic with Dr. Mckenzie in 2 weeks or if going to nursing home  then with Lexa  Disposition: Pending progress with therapies, pain control on orals, and medical stability, anticipate discharge to [facility] or previous facility  on POD #2-3.        Bud Branch MD  Orthopaedic Surgery Resident, PGY1   Pager: [phone]    Please page me directly with any questions/concerns during regular weekday hours before 5pm. If there is no response, if it is a weekend, or if it is during evening hours then please page the orthopaedic surgery resident on call.

## 2021-06-26 NOTE — PHARMACY-ADMISSION MEDICATION HISTORY
Admission Medication History Completed by Pharmacy    See [facility] Admission Navigator for allergy information, preferred outpatient pharmacy, prior to admission medications and immunization status.     Medication History Sources:     Patient and daughter    Changes made to PTA medication list (reason):    Added: all    Deleted: None    Changed: None    Additional Information:    Medication history was completed by medication history scribe on 6/25/21.    Prior to Admission medications    Medication Sig Last Dose Taking? Auth Provider   alendronate (FOSAMAX) 70 MG tablet Take 70 mg by mouth every 7 days Past Week at Unknown time Yes Reported, Patient   atorvastatin (LIPITOR) 10 MG tablet Take 10 mg by mouth daily Past Week at Unknown time Yes Reported, Patient   calcium carbonate (TUMS) 500 MG chewable tablet Take 1 chew tab by mouth daily Past Week at Unknown time Yes Reported, Patient   Cholecalciferol (VITAMIN D3) 1.25 MG (12324 UT) TABS Take 50,000 Units by mouth once a week Past Week at Unknown time Yes Reported, Patient   cyanocobalamin (VITAMIN B-12) 100 MCG tablet Take 100 mcg by mouth daily Past Week at Unknown time Yes Reported, Patient   donepezil (ARICEPT) 10 MG tablet Take 10 mg by mouth At Bedtime Past Week at Unknown time Yes Reported, Patient   hydrochlorothiazide (MICROZIDE) 12.5 MG capsule Take 25 mg by mouth every other day Past Week at Unknown time Yes Reported, Patient   Multiple Vitamins-Minerals (PRESERVISION/LUTEIN PO) Take 1 tablet by mouth daily Past Week at Unknown time Yes Reported, Patient   quinapril (ACCUPRIL) 40 MG tablet Take 40 mg by mouth daily Past Week at Unknown time Yes Reported, Patient       Date completed: 06/26/21    Medication history completed by: Luann Phillip [facility]

## 2021-06-26 NOTE — PLAN OF CARE
Focus: Transfer back to room 546 post ORIF/IM nailing  D: Afebrile, vss, lungs are clear. Baseline confused. Neuro's intact. I: Incentive spirometer given and used. P: Continue current plan of care.

## 2021-06-26 NOTE — PLAN OF CARE
"  VS: /57   Pulse 62   Temp 98.3  F (36.8  C) (Oral)   Resp 16   Ht 1.676 m (5' 6\")   SpO2 96%   Pt denies chest pain   O2: >90% on room air. Pt denies SOB   Output: Lizarraga catheter in place. Adequate output   Last BM: 6/25/21   Activity: NWB to LLE, pt is bedrest.    Up for meals? N/A NPO at 0000   Skin: Bruising to L hip  Cracked skin on bilateral feet and toes   Pain: Pt denies pain. ON-Q pain pump in place   CMS: Intact, pt denies numbness or tingling   Dressing: CDI to ON-Q pump   Diet: NPO   LDA: Lizarraga catheter in place  PIV R AC infusing  L hip ON-Q pump in place   Equipment: IV pole/pump, ON-Q pump, and personal belongings   Plan: Surgery scheduled for 6/26/21 at 0730   Additional Info:        "

## 2021-06-26 NOTE — ANESTHESIA CARE TRANSFER NOTE
Patient: Debi Morton    Procedure(s):  OPEN REDUCTION INTERNAL FIXATION, FRACTURE, FEMUR, USING INTRAMEDULLARY MICHELINE    Diagnosis: Fracture of left hip (H) [S72.002A]  Diagnosis Additional Information: No value filed.    Anesthesia Type:   General     Note:    Oropharynx: oropharynx clear of all foreign objects  Level of Consciousness: drowsy  Oxygen Supplementation: face mask  Level of Supplemental Oxygen (L/min / FiO2): 8  Independent Airway: airway patency satisfactory and stable  Dentition: dentition unchanged  Vital Signs Stable: post-procedure vital signs reviewed and stable  Report to RN Given: handoff report given  Patient transferred to: PACU    Handoff Report: Identifed the Patient, Identified the Reponsible Provider, Reviewed the pertinent medical history, Discussed the surgical course, Reviewed Intra-OP anesthesia mangement and issues during anesthesia, Set expectations for post-procedure period and Allowed opportunity for questions and acknowledgement of understanding      Vitals: (Last set prior to Anesthesia Care Transfer)  CRNA VITALS  6/26/2021 1105 - 6/26/2021 1150      6/26/2021             NIBP:  141/66    Pulse:  89    NIBP Mean:  39    Ht Rate:  89    Temp:  36.4  C (97.5  F)    SpO2:  100 %    Resp Rate (observed):  13    EKG:  Sinus rhythm        Electronically Signed By: CESAR Gomez CRNA  June 26, 2021  11:50 AM

## 2021-06-26 NOTE — PROGRESS NOTES
[facility], [place], [place]  Internal Medicine Progress Note      Assessment and Plans:     Debi Morton is a 86 year old female admitted on 6/25/2021 for evaluation of left intra trochanteric fracture. She had a fall. No report or syncope or LOC. She has a PMHx significant for HTN, Osteoporosis, Dementia and Dyslipidemia. Ortho evaluated the patient and is planning to take the patient to the OR tomorrow.      #Left intratrochanteric fracture  -Ortho following the patient.   -NPO after midnight.   -EKG showed sinus rhythm, no acute ischemic changes. She is medically optimized for surgery tomorrow.   -Gentle hydration.   -Pain control.      #Leucocytosis   -Most likely reactive. No signs of infection. UA negative for UTI.   -Continue trending.      #Dementia   -Sitter at bedside.   -Continue on PTA Aricept     #Acute urinary retention   -Bladder scan showed > 500 ml.   -Lizarraga cath PRN     #HTN   #Dyslipimdemia     -Continue on hydrochlorothiazide with holding parameters.   -Continue on PTA Lipitor.      #Osteoporosis   -Continue on Vitamin D        Diet: NPO per Anesthesia Guidelines for Procedure/Surgery Except for: Meds, Ice Chips  Regular Diet Adult    DVT Prophylaxis: Pneumatic Compression Devices and Anti-embolisim stockings (TEDs)  Lizarraga Catheter: Not present  Central Lines: None  Code Status:  Full code          Disposition Plan     Expected discharge:BRIAN Sethi MD, MPH.  Internal Medicine Attending  [place], [place]    Click on the link below to page me.   Text Page  (7am - 5pm, M-F)    Subjective:         No new issues   No chest pain or sob.    No fevers or chills      -Data reviewed today: I reviewed all new labs and imaging results over the last 24 hours.     Exam:         Temp: 95.6  F (35.3  C) Temp src: Axillary BP: 122/60 Pulse: 65   Resp: 16 SpO2: 96 % O2 Device: Nasal  cannula Oxygen Delivery: 1 LPM  Vitals:    06/26/21 0800   Weight: 72.6 kg (160 lb)     Vital Signs with Ranges  Temp:  [95.6  F (35.3  C)-98.3  F (36.8  C)] 95.6  F (35.3  C)  Pulse:  [55-71] 65  Resp:  [8-18] 16  BP: (112-141)/(55-70) 122/60  SpO2:  [93 %-100 %] 96 %  I/O last 3 completed shifts:  In: -   Out: 510 [Urine:510]    Constitutional: No distress noted, Alert, Answering questions appropriately  Respiratory: AE is good on both sides, no wheezing onr crackles  Cardiovascular: S1S2 normal, no new murmur  GI: Soft, non tender  Skin/Integumen: No rash      Medications     BUPivacaine 0.125% in On-Q pump (select flow) 14 mL/hr at 06/26/21 1330     - MEDICATION INSTRUCTIONS -       sodium chloride Stopped (06/26/21 1300)       acetaminophen  975 mg Oral TID     atorvastatin  10 mg Oral Daily     calcium carbonate  500 mg Oral Daily     [START ON 6/28/2021] cholecalciferol  50,000 Units Oral Weekly     clindamycin  900 mg Intravenous Q8H     cyanocobalamin  100 mcg Oral Daily     donepezil  10 mg Oral At Bedtime     [START ON 6/27/2021] enoxaparin ANTICOAGULANT  40 mg Subcutaneous Q24H     hydrochlorothiazide  25 mg Oral Every Other Day     methocarbamol  500 mg Oral 4x Daily       Data   Recent Labs   Lab 06/25/21  1156 06/25/21  1005   WBC 15.9*  --    HGB 10.7*  --    MCV 94  --    *  --    INR  --  1.03   NA  --  138   POTASSIUM  --  3.3*   CHLORIDE  --  103   CO2  --  28   BUN  --  29   CR  --  0.75   ANIONGAP  --  7   TAY  --  9.3   GLC  --  106*       Recent Results (from the past 24 hour(s))   XR Surgery AMANDA L/T 5 Min Fluoro    Narrative    This exam was marked as non-reportable because it will not be read by a   radiologist or a [place] non-radiologist provider.

## 2021-06-26 NOTE — PLAN OF CARE
"  VS: /56 (BP Location: Left arm)   Pulse 59   Temp 98.3  F (36.8  C) (Oral)   Resp 16   Ht 1.676 m (5' 6\")   SpO2 97%   Denies SOB and chest pain    O2: 97% at room air    Output: Patient retaining urine. Pt arrived 6:30pm had the urge to void but unsuccessful. Bladder scan showed 552ml urine. Gutierrez catheter was place.     Last BM: Unknown    Activity: Bedrest    Up for meals In bed    Skin: Intact    Pain: Mild pain when not moving  Has scheduled tylenol    CMS: Intact. Denies numbness and tingling    Dressing: none   Diet: Regular    LDA: PIV on R ac, infusing 100ml/hr  On Q pump continuous, 14ml/hr    Equipment: IV pole/pump, call light    Plan: For surgery brian    Additional Info: Patient NPO at midnight , for surgery brian   1:1 sitter in place. Pt has dementia and intermittent forgetful. Has the tendency to move out of bed, tried to removed gutierrez and pick IV line.          "

## 2021-06-26 NOTE — ANESTHESIA PREPROCEDURE EVALUATION
Anesthesia Pre-Procedure Evaluation    Patient: Debi Morton   MRN: [id number] : 11/10/1934        Preoperative Diagnosis: Fracture of left hip (H) [S72.002A]   Procedure : Procedure(s):  INTERNAL FIXATION, FRACTURE, TROCHANTERIC, HIP, USING PINS OR RODS     Past Medical History:   Diagnosis Date     Dementia (H)      Gastroesophageal reflux disease      Hypercalcemia      Hypertension      Lipidemia      Osteoporosis      Venous insufficiency      Vitamin D deficiency       History reviewed. No pertinent surgical history.   Allergies   Allergen Reactions     Penicillins Unknown      Social History     Tobacco Use     Smoking status: Unknown If Ever Smoked   Substance Use Topics     Alcohol use: Not Currently      Wt Readings from Last 1 Encounters:   No data found for Wt        Anesthesia Evaluation   Pt has had prior anesthetic. Type: General.        ROS/MED HX  ENT/Pulmonary:  - neg pulmonary ROS     Neurologic:     (+) dementia,     Cardiovascular:     (+) hypertension-----Irregular Heartbeat/Palpitations,     METS/Exercise Tolerance:     Hematologic:     (+) anemia,     Musculoskeletal: Comment: Osteoporosis,  Intertrochanteric  fracture      GI/Hepatic: Comment: Occasional GERD    (+) GERD, Other,     Renal/Genitourinary:  - neg Renal ROS     Endo: Comment: Hypercalcemia      Psychiatric/Substance Use:  - neg psychiatric ROS     Infectious Disease:  - neg infectious disease ROS     Malignancy:  - neg malignancy ROS     Other:            Physical Exam    Airway        Mallampati: II   TM distance: < 3 FB   Neck ROM: full   Mouth opening: > 3 cm    Respiratory Devices and Support         Dental       (+) chipped      Cardiovascular   cardiovascular exam normal       Rhythm and rate: regular     Pulmonary   pulmonary exam normal                OUTSIDE LABS:  CBC:   Lab Results   Component Value Date    WBC 15.9 (H) 2021    HGB 10.7 (L) 2021    HCT 32.8 (L) 2021     (H)  06/25/2021     BMP:   Lab Results   Component Value Date     06/25/2021    POTASSIUM 3.3 (L) 06/25/2021    CHLORIDE 103 06/25/2021    CO2 28 06/25/2021    BUN 29 06/25/2021    CR 0.75 06/25/2021     (H) 06/25/2021     COAGS:   Lab Results   Component Value Date    INR 1.03 06/25/2021     POC: No results found for: BGM, HCG, HCGS  HEPATIC: No results found for: ALBUMIN, PROTTOTAL, ALT, AST, GGT, ALKPHOS, BILITOTAL, BILIDIRECT, MIRA  OTHER:   Lab Results   Component Value Date    TAY 9.3 06/25/2021       Anesthesia Plan    ASA Status:  3, emergent       Anesthesia Type: General.   Induction: Intravenous, Propofol.   Maintenance: Balanced.   Techniques and Equipment:     - Lines/Monitors: 2nd IV     Consents    Anesthesia Plan(s) and associated risks, benefits, and realistic alternatives discussed. Questions answered and patient/representative(s) expressed understanding.     - Discussed with:  Legal Guardian      - Extended Intubation/Ventilatory Support Discussed: No.      - Patient is DNR/DNI Status: No    Use of blood products discussed: Yes.     - Discussed with: Legal guardian.     - Consented: consented to blood products            Reason for refusal: other.     Postoperative Care    Pain management: IV analgesics, Oral pain medications.     - Plan for long acting post-op opioid use   PONV prophylaxis: Ondansetron (or other 5HT-3), Dexamethasone or Solumedrol, Background Propofol Infusion     Comments:                Maranda Yepez MD

## 2021-06-26 NOTE — ANESTHESIA POSTPROCEDURE EVALUATION
Patient: Debi Morton    Procedure(s):  OPEN REDUCTION INTERNAL FIXATION, FRACTURE, FEMUR, USING INTRAMEDULLARY MICHELINE    Diagnosis:Fracture of left hip (H) [S72.002A]  Diagnosis Additional Information: No value filed.    Anesthesia Type:  General    Note:  Disposition: Inpatient   Postop Pain Control: Uneventful            Sign Out: Well controlled pain   PONV: No   Neuro/Psych: Uneventful            Sign Out: Acceptable/Baseline neuro status   Airway/Respiratory: Uneventful            Sign Out: Acceptable/Baseline resp. status   CV/Hemodynamics: Uneventful            Sign Out: Acceptable CV status; No obvious hypovolemia; No obvious fluid overload   Other NRE: NONE   DID A NON-ROUTINE EVENT OCCUR? No           Last vitals:  Vitals:    06/26/21 1215 06/26/21 1230 06/26/21 1237   BP: 132/63 129/65    Pulse: 67 61    Resp: 10 10    Temp: 36.5  C (97.7  F)     SpO2: 98% 97% 97%       Last vitals prior to Anesthesia Care Transfer:  CRNA VITALS  6/26/2021 1105 - 6/26/2021 1205      6/26/2021             NIBP:  141/66    Pulse:  89    NIBP Mean:  39    Ht Rate:  89    Temp:  36.4  C (97.5  F)    SpO2:  100 %    Resp Rate (observed):  13    EKG:  Sinus rhythm          Electronically Signed By: Maranda Yepez MD  June 26, 2021  12:45 PM

## 2021-06-26 NOTE — PROGRESS NOTES
Orthopedic progress note    Patient seen in her room.  Patient lying comfortably.  Patient able to provide an interview.  Patient confused at baseline.    Physical exam:  General: No acute distress sitting in bed comfortably  Chest:    Respiring comfortably  CV: Extremities are warm well perfused    MUSCULOSKELETAL exam notable for extremity:  Skin is intact  Catheter is present of the left hip  No obvious wounds are noted  EHL, FHL, ankle plantarflexion, ankle dorsiflexion is intact  DP palpable    Hemoglobin 10.7    Assessment and plan:  Patient is a 86-year-old female with past medical history of dementia who is presenting with a left intertrochanteric fracture.    Consent via phone obtained from the patient's power of  and ePrfecto Morton, son.  Additionally patient's daughter Marisol Morton is informed about the procedure as well.  Both children agreed to proceed with surgical intervention.    N.p.o. for OR  Nonweightbearing on affected extremity  Hold DVT prophylaxis for OR    Patient to go to the OR this morning for IM nail fixation of the left hip.    Bud Branch MD  Orthopaedic Surgery Resident, PGY4  Pager: [phone]

## 2021-06-26 NOTE — ANESTHESIA PROCEDURE NOTES
Airway       Patient location during procedure: OR  Staff -        CRNA: Marcel Davis APRN CRNA       Performed By: CRNA  Consent for Airway        Urgency: elective  Indications and Patient Condition       Indications for airway management: cecilia-procedural       Induction type:intravenous       Mask difficulty assessment: 2 - vent by mask + OA or adjuvant +/- NMBA    Final Airway Details       Final airway type: endotracheal airway       Successful airway: ETT - single and Oral  Endotracheal Airway Details        ETT size (mm): 7.0       Cuffed: yes       Successful intubation technique: direct laryngoscopy       DL Blade Type: MAC 3       Grade View of Cords: 2       Adjucts: stylet       Position: Right       Measured from: gums/teeth       Secured at (cm): 22       Bite block used: None    Post intubation assessment        Placement verified by: capnometry, equal breath sounds and chest rise        Number of attempts at approach: 1       Number of other approaches attempted: 0       Secured with: silk tape and other (comment) (tegaderm)       Ease of procedure: easy       Dentition: Intact and Unchanged    Medication(s) Administered   Medication Administration Time: 6/26/2021 8:40 AM

## 2021-06-27 ENCOUNTER — APPOINTMENT (OUTPATIENT)
Dept: PHYSICAL THERAPY | Facility: CLINIC | Age: 86
DRG: 482 | End: 2021-06-27
Payer: COMMERCIAL

## 2021-06-27 LAB
CREAT SERPL-MCNC: 0.73 MG/DL (ref 0.52–1.04)
GFR SERPL CREATININE-BSD FRML MDRD: 75 ML/MIN/{1.73_M2}
PLATELET # BLD AUTO: 605 10E9/L (ref 150–450)

## 2021-06-27 PROCEDURE — 85049 AUTOMATED PLATELET COUNT: CPT

## 2021-06-27 PROCEDURE — 250N000009 HC RX 250

## 2021-06-27 PROCEDURE — 99207 PR CDG-CUT & PASTE-POTENTIAL IMPACT ON LEVEL: CPT | Performed by: HOSPITALIST

## 2021-06-27 PROCEDURE — 250N000013 HC RX MED GY IP 250 OP 250 PS 637: Performed by: PHYSICIAN ASSISTANT

## 2021-06-27 PROCEDURE — 120N000002 HC R&B MED SURG/OB UMMC

## 2021-06-27 PROCEDURE — 99232 SBSQ HOSP IP/OBS MODERATE 35: CPT | Performed by: HOSPITALIST

## 2021-06-27 PROCEDURE — 97530 THERAPEUTIC ACTIVITIES: CPT | Mod: GP | Performed by: PHYSICAL THERAPIST

## 2021-06-27 PROCEDURE — 250N000011 HC RX IP 250 OP 636

## 2021-06-27 PROCEDURE — 36415 COLL VENOUS BLD VENIPUNCTURE: CPT

## 2021-06-27 PROCEDURE — 97161 PT EVAL LOW COMPLEX 20 MIN: CPT | Mod: GP | Performed by: PHYSICAL THERAPIST

## 2021-06-27 PROCEDURE — 82565 ASSAY OF CREATININE: CPT

## 2021-06-27 RX ADMIN — METHOCARBAMOL 500 MG: 500 TABLET, FILM COATED ORAL at 16:49

## 2021-06-27 RX ADMIN — Medication 2.5 MG: at 22:57

## 2021-06-27 RX ADMIN — Medication 100 MCG: at 08:41

## 2021-06-27 RX ADMIN — HYDROCHLOROTHIAZIDE 25 MG: 25 TABLET ORAL at 09:08

## 2021-06-27 RX ADMIN — ATORVASTATIN CALCIUM 10 MG: 10 TABLET, FILM COATED ORAL at 08:41

## 2021-06-27 RX ADMIN — CALCIUM CARBONATE (ANTACID) CHEW TAB 500 MG 500 MG: 500 CHEW TAB at 08:41

## 2021-06-27 RX ADMIN — CLINDAMYCIN PHOSPHATE 900 MG: 900 INJECTION, SOLUTION INTRAVENOUS at 01:33

## 2021-06-27 RX ADMIN — ACETAMINOPHEN 975 MG: 325 TABLET, FILM COATED ORAL at 08:41

## 2021-06-27 RX ADMIN — METHOCARBAMOL 500 MG: 500 TABLET, FILM COATED ORAL at 08:41

## 2021-06-27 RX ADMIN — METHOCARBAMOL 500 MG: 500 TABLET, FILM COATED ORAL at 12:18

## 2021-06-27 RX ADMIN — ENOXAPARIN SODIUM 40 MG: 40 INJECTION SUBCUTANEOUS at 09:08

## 2021-06-27 RX ADMIN — ACETAMINOPHEN 975 MG: 325 TABLET, FILM COATED ORAL at 19:32

## 2021-06-27 RX ADMIN — METHOCARBAMOL 500 MG: 500 TABLET, FILM COATED ORAL at 19:32

## 2021-06-27 RX ADMIN — ACETAMINOPHEN 975 MG: 325 TABLET, FILM COATED ORAL at 14:09

## 2021-06-27 RX ADMIN — DONEPEZIL HYDROCHLORIDE 10 MG: 10 TABLET ORAL at 21:31

## 2021-06-27 NOTE — PROGRESS NOTES
06/27/21 1253   Quick Adds   Type of Visit Initial PT Evaluation       Present no   Language English   Living Environment   People in home alone   Current Living Arrangements assisted living   Home Accessibility no concerns   Living Environment Comments Pt reported at the [facility].    Self-Care   Usual Activity Tolerance moderate   Current Activity Tolerance poor   Equipment Currently Used at Home none   Activity/Exercise/Self-Care Comment Pt was independent with basic ADLs at baseline (dressing and showering).  Pt received assistance for meds and cleaning/laundry 1x a week.  Pt also received breakfast and dinner.     Disability/Function   Hearing Difficulty or Deaf no   Wear Glasses or Blind yes   Vision Management glasses   Concentrating, Remembering or Making Decisions Difficulty yes   Difficulty Communicating no   Difficulty Eating/Swallowing no   Walking or Climbing Stairs Difficulty no   Dressing/Bathing Difficulty no   Toileting issues no   Fall history within last six months yes   Number of times patient has fallen within last six months 1   Change in Functional Status Since Onset of Current Illness/Injury yes   General Information   Onset of Illness/Injury or Date of Surgery 06/25/21   Referring Physician Bud Branch MD   Patient/Family Therapy Goals Statement (PT) Goal not verbalized   Pertinent History of Current Problem (include personal factors and/or comorbidities that impact the POC) Pt is a 86 year old female s/p L hip IM nail, fracture sustained after falling when getting out of bed.  PMHx significant for HTN, Osteoporosis, Dementia and Dyslipidemia.   Existing Precautions/Restrictions fall   Weight-Bearing Status - LUE full weight-bearing   Weight-Bearing Status - RUE full weight-bearing   Weight-Bearing Status - LLE nonweight-bearing   Weight-Bearing Status - RLE full weight-bearing   Heart Disease Risk Factors Age;High blood pressure   General Observations Pt supine in  bed at start of PT evaluation, agreeable to PT evaluation.    Cognition   Affect/Mental Status (Cognition) other (see comments);anxious  (dementia at baseline)   Follows Commands (Cognition) 75-90% accuracy;follows one-step commands   Safety Deficit (Cognition) judgment;insight into deficits/self-awareness;awareness of need for assistance;safety precautions awareness   Pain Assessment   Patient Currently in Pain Yes, see Vital Sign flowsheet   Integumentary/Edema   Integumentary/Edema other (describe)   Integumentary/Edema Comments Incision not observed secondary to post-op dressing in place.    Posture    Posture Comments Not tested   Range of Motion (ROM)   ROM Comment Left LE ROM limited secondary to post-op pain and s/p left hip IMN   Strength   Strength Comments LE strength not formally tested secondary to pain.     Bed Mobility   Impairments Contributing to Impaired Bed Mobility pain;decreased ROM;decreased strength   Comment (Bed Mobility) Pt demonstrated rolling left to/from right side with mod A x 2, able to assist with rolling using bed rails.  Pt dependent for scooting/repositioning in bed.    Transfers   Transfer Safety Comments Not initiated secondary to pain    Gait/Stairs (Locomotion)   Comment (Gait/Stairs) Not initiated secondary to pain.    Balance   Balance Comments Not tested.    Sensory Examination   Sensory Perception Comments Not tested.    Clinical Impression   Criteria for Skilled Therapeutic Intervention yes, treatment indicated   PT Diagnosis (PT) Decreased ROM, decreased strength, and impaired functional mobility.    Influenced by the following impairments Post-op pain, weakness, and s/p left hip IMN.    Functional limitations due to impairments Impaired bed mobility, transfers, and gait.    Clinical Presentation Stable/Uncomplicated   Clinical Presentation Rationale Pt is a 85 y/o female s/p left hip IMN.  Pt was independent with all mobility at baseline without AD.     Clinical Decision  Making (Complexity) low complexity   Therapy Frequency (PT) Daily   Predicted Duration of Therapy Intervention (days/wks) 5 days   Planned Therapy Interventions (PT) bed mobility training;transfer training;strengthening   Risk & Benefits of therapy have been explained evaluation/treatment results reviewed;care plan/treatment goals reviewed;risks/benefits reviewed;current/potential barriers reviewed;patient;daughter;son   PT Discharge Planning    PT Discharge Recommendation (DC Rec) Transitional Care Facility   PT Rationale for DC Rec Pt would benefit from a TCU stay prior to discharge to home for strengthening, bed mobility, and transfers.    PT Brief overview of current status  Pt needed mod A x 2 for rolling and dependent for scooting/repositioning in bed.   Total Evaluation Time   Total Evaluation Time (Minutes) 18

## 2021-06-27 NOTE — PLAN OF CARE
"  VS: /44 (BP Location: Left arm)   Pulse 72   Temp 97  F (36.1  C) (Oral)   Resp 19   Ht 1.676 m (5' 6\")   Wt 72.6 kg (160 lb)   SpO2 98%   BMI 25.82 kg/m    LS clear, BS active. Denies SOB or CP   O2: 98% at RA   Output: Lizarraga with 400 ml of output   Last BM: 6/27   Activity: Pt was not OOB, since NWB of LLE, writer paged Ortho , ordered Therapy to work  and assess patient first. They where unable to assess as lko lift was out of order. Plan to fix it of change the room   Skin: Intact with the left hip incision dressings CD&I   Pain: Pain managed with scheduled Robaxin  500 mg and Tylenol 975 mg and ice pack for comfort.   CMS: Intact, No N/T   Dressing: CD&I   Diet: Regular   and hin liquids, tolerating well   LDA: PIV in Rt arm patent and SL. Lizarraga patent with good output. Q pump intact .   Equipment: None   Plan: Continue with POC   Additional Info: 1:1 Patient is A&O with intermittent confusion.  Encouraged fluids.       "

## 2021-06-27 NOTE — PROGRESS NOTES
[facility], [place], [place]  Internal Medicine Progress Note      Assessment and Plans:     Debi Morton is a 86 year old female admitted on 6/25/2021 for evaluation of left intra trochanteric fracture. She had a fall. No report or syncope or LOC. She has a PMHx significant for HTN, Osteoporosis, Dementia and Dyslipidemia. Ortho evaluated the patient and is planning to take the patient to the OR tomorrow.      #Left intratrochanteric fracture  -Ortho following the patient.   -NPO after midnight.   -EKG showed sinus rhythm, no acute ischemic changes. She is medically optimized for surgery tomorrow.   -Gentle hydration.   -Pain control.      #Leucocytosis   -Most likely reactive. No signs of infection. UA negative for UTI.   -Continue trending.      #Dementia   -Sitter at bedside.   -Continue on PTA Aricept     #Acute urinary retention   -Bladder scan showed > 500 ml.   -Lizarraga cath PRN     #HTN   #Dyslipimdemia     -Continue on hydrochlorothiazide with holding parameters.   -Continue on PTA Lipitor.      #Osteoporosis   -Continue on Vitamin D        Diet: NPO per Anesthesia Guidelines for Procedure/Surgery Except for: Meds, Ice Chips  Regular Diet Adult    DVT Prophylaxis: Pneumatic Compression Devices and Anti-embolisim stockings (TEDs)  Lizarraga Catheter: Not present  Central Lines: None  Code Status:  Full code          Disposition Plan     Expected discharge:BRIAN Sethi MD, MPH.  Internal Medicine Attending  [place], [place]    Click on the link below to page me.   Text Page  (7am - 5pm, M-F)    Subjective:         No new issues   No chest pain or sob.    No fevers or chills      -Data reviewed today: I reviewed all new labs and imaging results over the last 24 hours.     Exam:         Temp: 97  F (36.1  C) Temp src: Oral BP: 111/44 Pulse: 72   Resp: 19 SpO2: 98 % O2 Device: None (Room  air) Oxygen Delivery: 1 LPM  Vitals:    06/26/21 0800   Weight: 72.6 kg (160 lb)     Vital Signs with Ranges  Temp:  [97  F (36.1  C)-97.4  F (36.3  C)] 97  F (36.1  C)  Pulse:  [63-76] 72  Resp:  [13-19] 19  BP: ()/(44-82) 111/44  SpO2:  [94 %-98 %] 98 %  I/O last 3 completed shifts:  In: 2194 [P.O.:90; I.V.:2104]  Out: 465 [Urine:365; Blood:100]    Constitutional: No distress noted, Alert, Answering questions appropriately  Respiratory: AE is good on both sides, no wheezing onr crackles  Cardiovascular: S1S2 normal, no new murmur  GI: Soft, non tender  Skin/Integumen: No rash      Medications     BUPivacaine 0.125% in On-Q pump (select flow) 14 mL/hr at 06/26/21 1330     - MEDICATION INSTRUCTIONS -       sodium chloride 100 mL/hr at 06/26/21 2147       acetaminophen  975 mg Oral TID     atorvastatin  10 mg Oral Daily     calcium carbonate  500 mg Oral Daily     [START ON 6/28/2021] cholecalciferol  50,000 Units Oral Weekly     cyanocobalamin  100 mcg Oral Daily     donepezil  10 mg Oral At Bedtime     enoxaparin ANTICOAGULANT  40 mg Subcutaneous Q24H     hydrochlorothiazide  25 mg Oral Every Other Day     methocarbamol  500 mg Oral 4x Daily       Data   Recent Labs   Lab 06/27/21  0622 06/25/21  1156 06/25/21  1005   WBC  --  15.9*  --    HGB  --  10.7*  --    MCV  --  94  --    * 664*  --    INR  --   --  1.03   NA  --   --  138   POTASSIUM  --   --  3.3*   CHLORIDE  --   --  103   CO2  --   --  28   BUN  --   --  29   CR 0.73  --  0.75   ANIONGAP  --   --  7   TAY  --   --  9.3   GLC  --   --  106*       No results found for this or any previous visit (from the past 24 hour(s)).

## 2021-06-27 NOTE — PLAN OF CARE
"  VS: /44 (BP Location: Left arm)   Pulse 72   Temp 97  F (36.1  C) (Oral)   Resp 19   Ht 1.676 m (5' 6\")   Wt 72.6 kg (160 lb)   SpO2 98%   BMI 25.82 kg/m    Pt denies chest pain   O2: >90% on room air. Pt denies SOB.    Output: Lizarraga in place, catheter is patent.    Last BM: 6/26/21 per pt report.    Activity: NWB to LLE for six weeks. Pt has confusion and is struggling to understand what NWB is. Writer felt unsafe trying to get pt OOB during this shift.    Up for meals? N/A   Skin: Incision to L hip and lateral L knee   Pain: Denies   CMS: Intact. Pt denies numbness or tingling.    Dressing: CDI to L hip and lateral knee dressings   Diet: Regular   LDA: PIV R forearm - infusing  PIV L hand - SL  ON - Q pump is continuous at 14 ml/Hr   Equipment: IV pole/pump, CAPNO, walker gaitbelt, and pt belongings.    Plan: Per Dr. Branch's note: pending therapies and pain control pt to discharge on POD #2-3.    Additional Info:        "

## 2021-06-27 NOTE — PLAN OF CARE
OT: HOLD per PT, one discipline appropriate at this time based on current status and participation. Will initiate OT when appropriate according to POC.

## 2021-06-27 NOTE — PROGRESS NOTES
Orthopaedic Surgery Progress Note 06/27/2021    Assessment: Debi Morton is a 86 year old female s/p L hip IM nail on 6/26/21 with Dr. Mckenzie. Doing well.     Doing well.    Goals: work w/ therapies today. Will likely return to her previous living facility if able. Podiatry consult pending.    Plan:  Orthopedics Primary  Activity: Up with assist.  Weight bearing status: NWB x6 weeks.  Antibiotics: clindamycin x24 hours perioperatively.  Diet: Begin with clear fluids and progress diet as tolerated.  DVT prophylaxis: lovenox in house, and 4 weeks aspirin 162mg QD and mechanical while in the hospital  Wound Care: Dressing change at bedside by Ortho on POD #3.  Pain management: transition from IV to orals as tolerated.   X-rays:  Obtain intraoperatively  Physical Therapy:  ROM, ADL's.  Occupational Therapy: ADL's.  Labs: Trend Hgb on POD #1  Consults: podiatry for toe nail trimming, Medicine for comanagement  Follow-up: Clinic with Dr. Mckenzie in 2 weeks or if going to nursing home then with Lexa  Disposition: Pending progress with therapies, pain control on orals, and medical stability, anticipate discharge to [facility] or previous facility  on POD #2-3.       Bud Branch MD  Orthopaedic Surgery Resident, PGY4      --------------------------------------------------------------------------------------------------------------------------------------------------------------------------------------------    S: No acute events overnight.  Pain  controlled on meds. Appears comfortable. Tolerating po diet. Pleasantly confused.      O:  Temp: 97  F (36.1  C) Temp src: Oral BP: 111/44 Pulse: 72   Resp: 19 SpO2: 98 % O2 Device: None (Room air) Oxygen Delivery: 1 LPM    Exam:  Gen: No acute distress, resting comfortably in bed.  Resp: Non-labored breathing  MSK:  LLE:  - Dressings c/d/i  - SILT grossly throughout extremity  - Fires TA, EHL, FHL, GaSC  - DP pulses intact, foot wwp.    Recent Labs   Lab 06/27/21  0622  06/25/21  1156   WBC  --  15.9*   HGB  --  10.7*   * 664*     No results found for: SED

## 2021-06-27 NOTE — PROGRESS NOTES
REGIONAL ANESTHESIA PAIN SERVICE CONTINUOUS NERVE INFUSION NOTE  SUBJECTIVE:  Interval History: Pt reports good pain pain control via continuous peripheral nerve block (CPNB) infusion.  Denies any weakness, paresthesias, circumoral numbness, metallic taste or tinnitus. . Patient is tolerating a regular diet.        Anticoagulation:       Medications related to Pain Management (From now, onward)    Start     Dose/Rate Route Frequency Ordered Stop    06/25/21 1400  bupivacaine 0.125 % in NaCl 0.9%, ON-Q C-Bloc select flow ([id number] holds 600-750 mL) single cath disposable pump 1 Device      14 mL/hr  Irrigation CONTINUOUS 06/25/21 1356      06/25/21 1205  acetaminophen (TYLENOL) tablet 975 mg      975 mg Oral 3 TIMES DAILY 06/25/21 1201      06/25/21 1205  methocarbamol (ROBAXIN) tablet 500 mg      500 mg Oral 4 TIMES DAILY 06/25/21 1201      06/25/21 1201  oxyCODONE IR (ROXICODONE) half-tab 2.5 mg      2.5 mg Oral EVERY 4 HOURS PRN 06/25/21 1201               OBJECTIVE:    Diagnostic:  Lab Results   Component Value Date    WBC 15.9 06/25/2021     Lab Results   Component Value Date    RBC 3.50 06/25/2021     Lab Results   Component Value Date    HGB 10.7 06/25/2021     Lab Results   Component Value Date    HCT 32.8 06/25/2021     Lab Results   Component Value Date     06/25/2021         Vitals:    Temp:  [95.6  F (35.3  C)-98.1  F (36.7  C)] 95.6  F (35.3  C)  Pulse:  [55-76] 76  Resp:  [8-18] 15  BP: (109-141)/(50-82) 109/50  SpO2:  [93 %-100 %] 96 %    Exam:    Strength 5/5 and symmetric grossly in bilateral LE.  Catheter site dressing c/d/i, no tenderness, erythema, heme, edema      ASSESSMENT/PLAN:    Debi Morton is a 86 year old female POD #0 s/p No admission procedures for hospital encounter..   Pt is receiving good analgesia with current rate. States that she has 'no pain'. No evidence of adverse side effects associated with local anesthetic.     - continue current total infusion rate of local  anesthetics  - will continue to follow and adjust as needed    Raquel Seals MD  Regional Anesthesia Pain Service  6/26/2021 7:12 PM    24 hour Job Code Pager.  For in-house use only.     [place]:  * * [phone]  [place]: * * [phone]  Peds: * * [phone]

## 2021-06-27 NOTE — CONSULTS
Fascia iliaca catheter placed by raps team. See procedure note by Cedric Garcia DO. Patient is having good analgesia after her surgery today. Reports no pain.      Raquel Seals MD, PhD    [facility] MED SURG ORTHOPEDIC  [address] [place] [id number] [phone]  Dept: [phone]

## 2021-06-27 NOTE — OP NOTE
Orthopedic Surgery Operative Note      Medical Record Number: [id number]     YOB: 1934     Surgery Date:  6/26/2021     Primary Surgeon:  Dr. Mckenzie     Resident: Bud Branch, PGY4       Pre-operative Diagnosis:      1. left Intertrochanteric Hip Fracture     Post-operative Diagnosis:      Same as pre-op     Procedure:    1. Left Hip Intramedullary Nail     Anesthesia: General Endotracheal Anesthesia      EBL:  100 mL     Fluid:  see anesthesia report      Specimens:  none     Complications: none     Findings:  same as pre-operative diagnosis. Reduction was easily achieved with closed manipulation.      Implants:   Implants:           Implant Name Type Inv. Item Serial No.  Lot No. LRB No. Used Action   IMPLANT RECORD             1 Implanted   11 x 400mm T2 Aplha Femoral Nail GT       LEE [id number] Left 1 Implanted   6.5 x 100mm Lag Screw, Recon       LEE [id number] Left 1 Implanted   SCREW LAG T2 RECON 6.5X90MM - [id number] [employer] SCREW LAG T2 RECON 6.5X90MM   [employer] [id number] Left 1 Implanted   5 x 42.5mm locking screw       LEE [id number] Left 1 Implanted and Explanted   5 x 45mm locking screw       LEE [id number] Left 1 Implanted         SURGICAL INDICATIONS:  This is an 86 year old-year-old female who unfortunately sustained a left femur fracture after a fall.  The patient received medical evaluation as well as evaluation by the orthopedic surgery service.  When the patient was cleared from a medical standpoint for surgical intervention we proceeded  to the operating room.  We discussed the risks / benefits and alternatives.  The benefits include pain control and improved rehab potential, risks include infection, neurovascular injury, cardiovascular injury, blood loss, transfusion,blood clot, symptomatic hardware, hardware failure, wound issues, weakness / limp, leg length inequality, repeat surgery, periprosthetic fracture and even death.       DESCRIPTION OF PROCEDURE: The patient was identified in the preoperative holding area where the correct operative site was marked.  Consent was signed and verified. The patient  was wheeled to the operating theater and induced under anesthesia.  The patient was positioned on the fracture table in the supine position. All bony prominences were padded.  Using a combination of gross traction and rotation, we were able to reduce the fracture confirmed under fluoroscopy.  At that point, we prepped and draped the leg.  A time-out was performed per hospital protocol.  We used imaging to guide the placement of the guide pin on the medial aspect of the tip of the trochanter in line with the femoral shaft. This was then placed down past the lesser.  We used the opening reamer to gain access to the proximal femur..       At that point, we placed a ball-tipped guidewire across the fracture and down to the physeal scar in the center center position under image guidance.  We measured approximately 400 mm and a 400mm nail would be eventually chosen.  We reamed the femoral canal..     At that point, an size 11mm nail was chosen and implanted into place.  We placed the cephalomedullary guide pin and the derotation guide pin, followed by a 100mm lag screw just short of the subchondral bone and then the 90mm derotation screw was placed.  The set screw was not used.     A distal interlock screw was placed in the standard fashion in locked position.  All wounds were copiously irrigated and the wounds were closed in layers (0-vicryl for fascia, 2-0 monocryl for dermis and 3-0 monocryl for skin).  Sterile dressings were applied.  The patient was safely transferred to the [facility] and extubated in the standard fashion.     All counts were correct.      Dr. Mckenzie was scrubbed and/or immediately available for all key and critical portions of the procedure.      There were no complications noted at the conclusion of the case.      PLAN:  Orthopedics Primary  Activity: Up with assist.  Weight bearing status: NWB x6 weeks.  Antibiotics: clindamycin x24 hours perioperatively.  Diet: Begin with clear fluids and progress diet as tolerated.  DVT prophylaxis: lovenox in house, and 4 weeks aspirin 162mg QD and mechanical while in the hospital  Wound Care: Dressing change at bedside by Ortho on POD #3.  Pain management: transition from IV to orals as tolerated.   X-rays:  Obtain intraoperatively  Physical Therapy:  ROM, ADL's.  Occupational Therapy: ADL's.  Labs: Trend Hgb on POD #1  Consults: podiatry for toe nail trimming, Medicine for comanagement  Follow-up: Clinic with Dr. Mckenzie in 2 weeks or if going to nursing home then with Lexa  Disposition: Pending progress with therapies, pain control on orals, and medical stability, anticipate discharge to [facility] or previous facility  on POD #2-3.     Bud Branch, PGY-4  Orthopedic Surgery Resident

## 2021-06-28 ENCOUNTER — APPOINTMENT (OUTPATIENT)
Dept: PHYSICAL THERAPY | Facility: CLINIC | Age: 86
DRG: 482 | End: 2021-06-28
Payer: COMMERCIAL

## 2021-06-28 LAB
ANION GAP SERPL CALCULATED.3IONS-SCNC: 4 MMOL/L (ref 3–14)
BUN SERPL-MCNC: 16 MG/DL (ref 7–30)
CALCIUM SERPL-MCNC: 7.8 MG/DL (ref 8.5–10.1)
CHLORIDE SERPL-SCNC: 105 MMOL/L (ref 94–109)
CO2 SERPL-SCNC: 29 MMOL/L (ref 20–32)
CREAT SERPL-MCNC: 0.62 MG/DL (ref 0.52–1.04)
ERYTHROCYTE [DISTWIDTH] IN BLOOD BY AUTOMATED COUNT: 14.8 % (ref 10–15)
GFR SERPL CREATININE-BSD FRML MDRD: 81 ML/MIN/{1.73_M2}
GLUCOSE SERPL-MCNC: 99 MG/DL (ref 70–99)
HCT VFR BLD AUTO: 26.9 % (ref 35–47)
HGB BLD-MCNC: 9 G/DL (ref 11.7–15.7)
MCH RBC QN AUTO: 30.5 PG (ref 26.5–33)
MCHC RBC AUTO-ENTMCNC: 33.5 G/DL (ref 31.5–36.5)
MCV RBC AUTO: 91 FL (ref 78–100)
PLATELET # BLD AUTO: 685 10E9/L (ref 150–450)
POTASSIUM SERPL-SCNC: 3.6 MMOL/L (ref 3.4–5.3)
RBC # BLD AUTO: 2.95 10E12/L (ref 3.8–5.2)
SODIUM SERPL-SCNC: 138 MMOL/L (ref 133–144)
WBC # BLD AUTO: 14 10E9/L (ref 4–11)

## 2021-06-28 PROCEDURE — 250N000011 HC RX IP 250 OP 636: Performed by: STUDENT IN AN ORGANIZED HEALTH CARE EDUCATION/TRAINING PROGRAM

## 2021-06-28 PROCEDURE — 99207 PR CDG-MDM COMPONENT: MEETS MODERATE - UP CODED: CPT | Performed by: HOSPITALIST

## 2021-06-28 PROCEDURE — 80048 BASIC METABOLIC PNL TOTAL CA: CPT | Performed by: HOSPITALIST

## 2021-06-28 PROCEDURE — 250N000011 HC RX IP 250 OP 636

## 2021-06-28 PROCEDURE — 97530 THERAPEUTIC ACTIVITIES: CPT | Mod: GP

## 2021-06-28 PROCEDURE — 36415 COLL VENOUS BLD VENIPUNCTURE: CPT | Performed by: HOSPITALIST

## 2021-06-28 PROCEDURE — 250N000013 HC RX MED GY IP 250 OP 250 PS 637: Performed by: PHYSICIAN ASSISTANT

## 2021-06-28 PROCEDURE — 85027 COMPLETE CBC AUTOMATED: CPT | Performed by: HOSPITALIST

## 2021-06-28 PROCEDURE — 99232 SBSQ HOSP IP/OBS MODERATE 35: CPT | Performed by: HOSPITALIST

## 2021-06-28 PROCEDURE — 120N000002 HC R&B MED SURG/OB UMMC

## 2021-06-28 PROCEDURE — 258N000003 HC RX IP 258 OP 636: Performed by: HOSPITALIST

## 2021-06-28 PROCEDURE — 271N000002 HC RX 271: Performed by: STUDENT IN AN ORGANIZED HEALTH CARE EDUCATION/TRAINING PROGRAM

## 2021-06-28 RX ORDER — OXYCODONE HYDROCHLORIDE 5 MG/1
2.5 TABLET ORAL EVERY 4 HOURS PRN
Qty: 30 TABLET | Refills: 0 | Status: SHIPPED | OUTPATIENT
Start: 2021-06-28 | End: 2021-01-01

## 2021-06-28 RX ORDER — ACETAMINOPHEN 325 MG/1
975 TABLET ORAL EVERY 8 HOURS PRN
Qty: 60 TABLET | Refills: 0 | DISCHARGE
Start: 2021-06-28 | End: 2022-01-01

## 2021-06-28 RX ORDER — ACETAMINOPHEN 325 MG/1
975 TABLET ORAL EVERY 8 HOURS PRN
Qty: 60 TABLET | Refills: 0 | Status: SHIPPED | OUTPATIENT
Start: 2021-06-28 | End: 2021-06-28

## 2021-06-28 RX ADMIN — ACETAMINOPHEN 975 MG: 325 TABLET, FILM COATED ORAL at 21:12

## 2021-06-28 RX ADMIN — ENOXAPARIN SODIUM 40 MG: 40 INJECTION SUBCUTANEOUS at 07:39

## 2021-06-28 RX ADMIN — SODIUM CHLORIDE 1000 ML: 9 INJECTION, SOLUTION INTRAVENOUS at 15:56

## 2021-06-28 RX ADMIN — DONEPEZIL HYDROCHLORIDE 10 MG: 10 TABLET ORAL at 21:12

## 2021-06-28 RX ADMIN — METHOCARBAMOL 500 MG: 500 TABLET, FILM COATED ORAL at 15:56

## 2021-06-28 RX ADMIN — METHOCARBAMOL 500 MG: 500 TABLET, FILM COATED ORAL at 21:12

## 2021-06-28 RX ADMIN — Medication 2.5 MG: at 13:29

## 2021-06-28 RX ADMIN — ACETAMINOPHEN 975 MG: 325 TABLET, FILM COATED ORAL at 07:39

## 2021-06-28 RX ADMIN — ATORVASTATIN CALCIUM 10 MG: 10 TABLET, FILM COATED ORAL at 07:39

## 2021-06-28 RX ADMIN — Medication: at 00:22

## 2021-06-28 RX ADMIN — METHOCARBAMOL 500 MG: 500 TABLET, FILM COATED ORAL at 13:29

## 2021-06-28 RX ADMIN — ACETAMINOPHEN 975 MG: 325 TABLET, FILM COATED ORAL at 13:29

## 2021-06-28 RX ADMIN — Medication 100 MCG: at 07:39

## 2021-06-28 RX ADMIN — CALCIUM CARBONATE (ANTACID) CHEW TAB 500 MG 500 MG: 500 CHEW TAB at 07:39

## 2021-06-28 RX ADMIN — CHOLECALCIFEROL CAP 1.25 MG (50000 UNIT) 50000 UNITS: 1.25 CAP at 07:47

## 2021-06-28 RX ADMIN — METHOCARBAMOL 500 MG: 500 TABLET, FILM COATED ORAL at 07:39

## 2021-06-28 NOTE — DISCHARGE SUMMARY
ORTHOPAEDIC SURGERY DISCHARGE SUMMARY     Date of Admission: 6/25/2021  Date of Discharge: 7/8/21  Disposition: Rehab  Staff Physician: No att. providers found  Primary Care Provider: No Ref-Primary, Physician    DISCHARGE DIAGNOSIS:  Fracture of left hip (H) [S72.002A]    PROCEDURES: Procedure(s):  LEFT OPEN REDUCTION INTERNAL FIXATION, FRACTURE, FEMUR, USING INTRAMEDULLARY MICHELINE on 6/25/2021 - 6/26/2021    BRIEF HISTORY:  This is an 86 year old-year-old female who unfortunately sustained a left femur fracture after a fall.  The patient received medical evaluation as well as evaluation by the orthopedic surgery service.  When the patient was cleared from a medical standpoint for surgical intervention we proceeded  to the operating room.  We discussed the risks / benefits and alternatives.  The benefits include pain control and improved rehab potential, risks include infection, neurovascular injury, cardiovascular injury, blood loss, transfusion,blood clot, symptomatic hardware, hardware failure, wound issues, weakness / limp, leg length inequality, repeat surgery, periprosthetic fracture and even death.     HOSPITAL COURSE:    The patient was admitted following the above listed procedures for pain control and rehabilitation. Debi Morton did well post-operatively. Medicine was consulted post operatively to aid in management of medical co-morbidities. The patient received routine nursing cares and at the time of discharge was medically stable. Vital signs were stable throughout admission. The patient is tolerating a regular diet and is voiding spontaneously. All PT/OT goals have been met for safe mobility. Pain is now controlled on oral medications which will be available on discharge. Stool softeners have been used while taking pain medications to help prevent constipation. Debi Morton is deemed medically safe to discharge. Podiatry consult was obtained for nail evaluation and trimming and completed  on 7/1.    Antibiotics:   given periop and 24 hours postop.   DVT prophylaxis:  lovenox in house and aspirin for 4 weeks outpatient   PT Progress:  Has met PT/OT goals for safe mobility.    Pain Meds:  Weaned off all IV pain meds by discharge.  Inpatient Events: No significant events or complications.     PHYSICAL EXAM:    Gen: No acute distress, resting comfortably in bed.  Resp: Non-labored breathing  MSK:  LLE:  - incision c/d/i.   - SILT grossly throughout extremity  - Fires TA, EHL, FHL, GaSC  -  foot wwp.    FOLLOWUP:    Clinic with Dr. Mckenzie in 2 weeks or if going to nursing home then with Lexa  Future Appointments   Date Time Provider Department Center   6/28/2021  3:15 PM Eulalia Le, PT URPT [place]   6/28/2021  7:00 PM UR OT OVERFLOW UROT [place]   6/29/2021  6:30 AM UR PT OVERFLOW PT [place]       Orthopaedic Surgery appointments are at the [facility] [place] ([address] [place] [address]). Call [phone] to schedule a follow-up appointment at this location with your provider.     PLANNED DISCHARGE ORDERS:      Discharge Medication List as of 7/8/2021 12:37 PM      START taking these medications    Details   aspirin (ASA) 325 MG tablet Take 0.5 tablets (162 mg) by mouth daily, Disp-30 tablet, R-0, Transitional      oxyCODONE (ROXICODONE) 5 MG tablet Take 0.5 tablets (2.5 mg) by mouth every 4 hours as needed for moderate to severe pain or severe pain, Disp-30 tablet, R-0, Local Print      polyethylene glycol (MIRALAX) 17 g packet Take 17 g by mouth daily, Disp-7 packet, R-0, E-Prescribe      senna-docusate (SENOKOT-S/PERICOLACE) 8.6-50 MG tablet Take 1-2 tablets by mouth 2 times daily as needed for constipation, Disp-30 tablet, R-0, Transitional         CONTINUE these medications which have CHANGED    Details   acetaminophen (TYLENOL) 325 MG tablet Take 3 tablets (975 mg) by mouth every 8 hours as needed for mild pain, Disp-60 tablet, R-0, Transitional       tiZANidine (ZANAFLEX) 4 MG tablet Take 1 tablet (4 mg) by mouth 3 times daily as needed, Disp-30 tablet, R-0, Transitional         CONTINUE these medications which have NOT CHANGED    Details   alendronate (FOSAMAX) 70 MG tablet Take 70 mg by mouth every 7 days, Historical      atorvastatin (LIPITOR) 10 MG tablet Take 10 mg by mouth daily, Historical      calcium carbonate (TUMS) 500 MG chewable tablet Take 1 chew tab by mouth daily, Historical      Cholecalciferol (VITAMIN D3) 1.25 MG (44621 UT) TABS Take 50,000 Units by mouth once a week, Historical      cyanocobalamin (VITAMIN B-12) 100 MCG tablet Take 100 mcg by mouth daily, Historical      donepezil (ARICEPT) 10 MG tablet Take 10 mg by mouth At Bedtime, Historical      hydrochlorothiazide (MICROZIDE) 12.5 MG capsule Take 25 mg by mouth every other day, Historical      Multiple Vitamins-Minerals (PRESERVISION/LUTEIN PO) Take 1 tablet by mouth daily, Historical      quinapril (ACCUPRIL) 40 MG tablet Take 40 mg by mouth daily, Historical               Discharge Procedure Orders   Reason for your hospital stay   Order Comments: s/p L hip IM nail on 6/26/21 with Dr. Mckenzie.     Activity   Order Comments: Your activity upon discharge: activity as tolerated    Activity: Up with assist.  Weight bearing status: NWB x6 weeks.     Order Specific Question Answer Comments   Is discharge order? Yes      When to contact your care team   Order Comments: Call Dr Mckenzie if you have any of the following: temperature greater than 101.5  or less than 96.3 ,  increased shortness of breath, increased drainage, increased swelling or increased pain.     Adult [facility] Follow-up and recommended labs and tests   Order Comments: Follow-up: Clinic with Dr. Mckenzie in 2 weeks or if going to nursing home then with SARKIS Giles    Appointments on [place] and/or [facility] (with [facility] or [facility] provider or service). Call [phone] if you haven't heard regarding these  appointments within 7 days of discharge.     Wound care and dressings   Order Comments: Instructions to care for your wound at home: ice to area for comfort, keep wound clean and dry, may get incision wet in shower but do not soak or scrub, reinforce dressing as needed and remove dressing in 7 days.     General info for SNF   Order Comments: Length of Stay Estimate: Short Term Care: Estimated # of Days <30  Condition at Discharge: Improving  Level of care:skilled   Rehabilitation Potential: Excellent  Admission H&P remains valid and up-to-date: Yes  Recent Chemotherapy: N/A  Use Nursing Home Standing Orders: Yes     Mantoux instructions   Order Comments: Give two-step Mantoux (PPD) Per Facility Policy Yes     Physical Therapy Adult Consult   Order Comments: Evaluate and treat as clinically indicated.    Reason:  S/p left hip repair     Occupational Therapy Adult Consult   Order Comments: Evaluate and treat as clinically indicated.    Reason: s/p left hip repair     Diet   Order Comments: Follow this diet upon discharge: Orders Placed This Encounter      Room Service      Regular Diet Adult     Order Specific Question Answer Comments   Is discharge order? Yes        Bud Branch MD  Orthopaedic Surgery Resident, PGY4  Pager: [phone]

## 2021-06-28 NOTE — PROGRESS NOTES
[facility], [place], [place]  Internal Medicine Progress Note    Assessment and Plans:     Debi Morton is a 86 year old female admitted on 6/25/2021 for evaluation of left intra trochanteric fracture. She had a fall. No report or syncope or LOC. She has a PMHx significant for HTN, Osteoporosis, Dementia and Dyslipidemia. Ortho evaluated the patient and is planning to take the patient to the OR tomorrow.      #Left intratrochanteric fracture  -Ortho following the patient.   -NPO after midnight.   -EKG showed sinus rhythm, no acute ischemic changes. She is medically optimized for surgery tomorrow.   -Gentle hydration.   -Pain control.     #HTN   #Dyslipimdemia     -Was getting hydrochlorothiazide  -BP dropped on standing with therapy  -give one liter of IVF bolus. Hold hydrochlorothiazide.   -PTA Accupril on hold.   -Continue on PTA Lipitor.      #Leucocytosis   -likely stress demargination. Improving.   -Most likely reactive. No signs of infection. UA negative for UTI.   -Continue trending.      #Dementia   -Sitter at bedside.   -Continue on PTA Aricept     #Acute urinary retention   -Bladder scan showed > 500 ml.   -Lizarraga cath PRN     #Osteoporosis   -Continue on Vitamin D        Diet: NPO per Anesthesia Guidelines for Procedure/Surgery Except for: Meds, Ice Chips  Regular Diet Adult    DVT Prophylaxis: Pneumatic Compression Devices and Anti-embolisim stockings (TEDs)  Lizarraga Catheter: Not present  Central Lines: None  Code Status:  Full code      Disposition Plan     Expected discharge:TBD       Aryan Sethi MD, MPH.  Internal Medicine Attending  [place], [place]    Click on the link below to page me.   Text Page  (7am - 5pm, M-F)    Subjective:      No new issues   No chest pain or sob.    No fevers or chills   No nee issues noted.    NO BM yet.     -Data reviewed today: I reviewed all new labs and  imaging results over the last 24 hours.     Exam:         Temp: 99  F (37.2  C) Temp src: Oral BP: 136/54 Pulse: 70   Resp: 18 SpO2: 97 % O2 Device: None (Room air)    Vitals:    06/26/21 0800   Weight: 72.6 kg (160 lb)     Vital Signs with Ranges  Temp:  [98.2  F (36.8  C)-99  F (37.2  C)] 99  F (37.2  C)  Pulse:  [68-82] 70  Resp:  [18] 18  BP: (103-136)/(49-54) 136/54  SpO2:  [95 %-97 %] 97 %  I/O last 3 completed shifts:  In: 120 [P.O.:120]  Out: 550 [Urine:550]    Constitutional: No distress noted, Alert, Answering questions appropriately  Respiratory: AE is good on both sides, no wheezing onr crackles  Cardiovascular: S1S2 normal, no new murmur  GI: Soft, non tender  Skin/Integumen: No rash      Medications     BUPivacaine 0.125% in On-Q pump (select flow) 14 mL/hr at 06/28/21 0022     - MEDICATION INSTRUCTIONS -       sodium chloride 100 mL/hr at 06/26/21 2147       acetaminophen  975 mg Oral TID     atorvastatin  10 mg Oral Daily     calcium carbonate  500 mg Oral Daily     cholecalciferol  50,000 Units Oral Weekly     cyanocobalamin  100 mcg Oral Daily     donepezil  10 mg Oral At Bedtime     enoxaparin ANTICOAGULANT  40 mg Subcutaneous Q24H     hydrochlorothiazide  25 mg Oral Every Other Day     methocarbamol  500 mg Oral 4x Daily       Data   Recent Labs   Lab 06/28/21  0905 06/27/21  0622 06/25/21  1156 06/25/21  1005   WBC 14.0*  --  15.9*  --    HGB 9.0*  --  10.7*  --    MCV 91  --  94  --    * 605* 664*  --    INR  --   --   --  1.03     --   --  138   POTASSIUM 3.6  --   --  3.3*   CHLORIDE 105  --   --  103   CO2 29  --   --  28   BUN 16  --   --  29   CR 0.62 0.73  --  0.75   ANIONGAP 4  --   --  7   TAY 7.8*  --   --  9.3   GLC 99  --   --  106*       No results found for this or any previous visit (from the past 24 hour(s)).

## 2021-06-28 NOTE — PLAN OF CARE
"VS: /54 (BP Location: Left arm)   Pulse 70   Temp 99  F (37.2  C) (Oral)   Resp 18   Ht 1.676 m (5' 6\")   Wt 72.6 kg (160 lb)   SpO2 97%   BMI 25.82 kg/m     O2: Stable on room air    Output: Lizarraga pulled out at 2pm. Due to void. Incontinent of bowel in bladder.   Last BM: 6/28/21, on a bedpan, small BM.   Activity: Bedrest, PT scheduled to assist with mobility. Out of bed with PT and lift assist with Ax2. Sat on chair for 10 minute and got back to bed.    Skin: Intact except for incision on L hip and L lateral knee   Pain: Has prn oxycodone 2.5mg q 4hr and On Q pump running at 14mL/hr.   CMS: Intact oriented to self and person. Easily redirectable.    Dressing: CDI - L hip and L lateral knee.   Diet: Regular, fair appetite, need help with ordering food.   LDA: PIV  and left forearm infusing   On Q pump at 14mls on L hip.   Equipment: IV pole/pump, bed pan, commode, ceiling lift, call light    Plan: Continue plan of care, PT, and Podiatry consult.    Additional Info: niece and daughter involved in pt cares.  daughter and niece  at bedside during PT eval.    Pt got little hypotensive and diaphoretic while sitting up on chair VS obtained see flowsheet. BP when stable once in bed. Bolus of NS 1L given over 4 hours.        "

## 2021-06-28 NOTE — PLAN OF CARE
"  VS: /52 (BP Location: Left arm)   Pulse 68   Temp 98.3  F (36.8  C) (Oral)   Resp 18   Ht 1.676 m (5' 6\")   Wt 72.6 kg (160 lb)   SpO2 95%   BMI 25.82 kg/m    Denies SOB and chest pain    O2: 95% at room air    Output: Has gutierrez catheter in place , output adequate    Last BM: 6/27/21, pt use bedpan    Activity: Bedrest not out of bed this shift    Up for meals In bed   Skin: Intact except for incision on L hip and L lateral knee   Pain: Has prn oxycodone 2.5mg q 4hr    CMS: Intact  Intermittent disorientation to time, place and situation    Dressing: CDI - L hip and L lateral knee   Diet: Regular, fair appetite   LDA: PIV on right ac, SL and left hand SL   Equipment: IV pole/pump, bed pan, commode, ceiling lift, call light    Plan: Continue plan of care   Additional Info: Refill requested fr pharmacy for On Q pump.       "

## 2021-06-28 NOTE — PLAN OF CARE
VS: Temp: 98.3  F (36.8  C) Temp src: Oral BP: 108/52 Pulse: 68   Resp: 18 SpO2: 95 % O2 Device: None (Room air)    O2: 95% at room air    Output: Adequate amount in gutierrez catheter   Last BM: 6/27/21, with bedpan    Activity: Bedrest not out of bed this shift    Skin: Intact except for incision on L hip and L lateral knee   Pain: Has prn oxycodone 2.5mg q 4hr    CMS: Intact  Intermittent disorientation to time, place and situation    Dressing: CDI - L hip and L lateral knee   Diet: Regular   LDA: PIV on right ac, SL and left hand SL  On Q pump at 14mls   Equipment: IV pole/pump, bed pan, commode, ceiling lift, call light    Plan: Continue plan of care   Additional Info: Son and daughter involved in cares.

## 2021-06-28 NOTE — PROGRESS NOTES
Orthopaedic Surgery Progress Note 06/28/2021    Assessment: Debi Morton is a 86 year old female s/p L hip IM nail on 6/26/21 with Dr. Mckenzie. Doing well.     Doing well. Working w/ therapies. Last hgb 10.7.    Goals: work w/ therapies today. Will likely return to her previous living facility if able. Podiatry consult pending. Lizarraga out today.    Plan:  Orthopedics Primary  Activity: Up with assist.  Weight bearing status: NWB x6 weeks.  Antibiotics: clindamycin x24 hours perioperatively.  Diet: Begin with clear fluids and progress diet as tolerated.  DVT prophylaxis: lovenox in house, and 4 weeks aspirin 162mg QD and mechanical while in the hospital  Wound Care: Dressing change at bedside by Ortho on POD #3.  Pain management: transition from IV to orals as tolerated.   X-rays:  Obtain intraoperatively  Physical Therapy:  ROM, ADL's.  Occupational Therapy: ADL's.  Labs: Trend Hgb on POD #1  Consults: podiatry for toe nail trimming, Medicine for comanagement  Follow-up: Clinic with Dr. Mckenzie in 2 weeks or if going to nursing home then with Lexa  Disposition: Pending progress with therapies, pain control on orals, and medical stability, anticipate discharge to [facility] or previous facility  on POD #2-3.       Bud Branch MD  Orthopaedic Surgery Resident, PGY4      --------------------------------------------------------------------------------------------------------------------------------------------------------------------------------------------    S: Pain  controlled on meds. Appears comfortable. Tolerating po diet. Pleasantly confused.      O:  Temp: 99  F (37.2  C) Temp src: Oral BP: 136/54 Pulse: 70   Resp: 18 SpO2: 97 % O2 Device: None (Room air)      Exam:  Gen: No acute distress, resting comfortably in bed.  Resp: Non-labored breathing  MSK:  LLE:  - Dressings c/d/i  - SILT grossly throughout extremity  - Fires TA, EHL, FHL, GaSC  - DP pulses intact, foot wwp.    Recent Labs   Lab 06/27/21  0622  06/25/21  1156   WBC  --  15.9*   HGB  --  10.7*   * 664*     No results found for: SED

## 2021-06-28 NOTE — PROGRESS NOTES
REGIONAL ANESTHESIA PAIN SERVICE CONTINUOUS NERVE INFUSION NOTE  SUBJECTIVE:  Interval History: Pt reports good pain control via continuous peripheral nerve block (CPNB) infusion.  Denies any weakness, paresthesias, circumoral numbness, metallic taste or tinnitus. Two doses of oxycodone, 2.5mg, one overnight and one this afternoon.          Numerical Rating Scale:  1/10 at rest and 5/10 with movement.        Anticoagulation:  Enoxaparin q24      OBJECTIVE:    Diagnostic:  Lab Results   Component Value Date    WBC 14.0 06/28/2021     Lab Results   Component Value Date    RBC 2.95 06/28/2021     Lab Results   Component Value Date    HGB 9.0 06/28/2021     Lab Results   Component Value Date    HCT 26.9 06/28/2021     Lab Results   Component Value Date     06/28/2021         Vitals:    Temp:  [36.8  C (98.2  F)-37.2  C (99  F)] 37.2  C (99  F)  Pulse:  [68-82] 70  Resp:  [18] 18  BP: (103-136)/(49-54) 136/54  SpO2:  [95 %-97 %] 97 %    Exam:    Left fascia iliaca catheter site with dressing c/d/i, no tenderness, erythema, heme, edema      ASSESSMENT/PLAN:    Debi Morton is a 86 year old female POD #3 s/p left femur ORIF and placement of a fascia iliaca for analgesia.  Pt is receiving adequate analgesia with bupivacaine 0.2%, total infusion 14mL/hour.  Planning to work with PT shortly    - continue current total infusion of 14mL/hour  - can discontinue catheter any time, at this point not impeding progress. If difficult to work with PT today, I would favor removing the catheter.    Shmuel Odom MD  Regional Anesthesia Pain Service  6/28/2021 2:07 PM    RAPS Contact Info (24 hour job code pager is the last 4 digits) For in-house use only:   [employer] phone: [place] [phone], [place] [phone], [facility] [phone], then enter call-back number.    Text: Use [employer] on the Intranet <Paging/Directory> tab and enter Jobcode ID.   If no call back at any time, contact the hospital  and ask for RAPS attending  or backup

## 2021-06-29 NOTE — PLAN OF CARE
"VS: /54 (BP Location: Left arm)   Pulse 70   Temp 99  F (37.2  C) (Oral)   Resp 18   Ht 1.676 m (5' 6\")   Wt 72.6 kg (160 lb)   SpO2 97%   BMI 25.82 kg/m     O2: Stable on room air    Output:  Due to void. Bladder scanned 230cc @ 8:00pm.Incontinent of bowel in bladder.   Last BM: 6/28/21, on a bedpan, small BM.   Activity: Bedrest, PT scheduled to assist with mobility. Out of bed with PT and lift assist with Ax2. Sat on chair for 10 minute and got back to bed.    Skin: Intact except for incision on L hip and L lateral knee   Pain: Has prn oxycodone 2.5mg q 4hr and On Q pump running at 14mL/hr.   CMS: Intact oriented to self and person. Easily redirectable.    Dressing: CDI - L hip and L lateral knee.   Diet: Regular, fair appetite, need help with ordering food.   LDA: infusing  On Q pump at 14mls on L hip.   Equipment: IV pole/pump, bed pan, commode, ceiling lift, call light    Plan: Continue plan of care, PT, and Podiatry consult.    Additional Info: niece and daughter involved in pt cares.  daughter and niece  at bedside during PT eval. Pt would like to have all her 3 kids with her to visit.      Pt got little hypotensive and diaphoretic while sitting up on chair VS obtained see flowsheet. BP when stable once in bed. Bolus of NS 1L given over 4 hours.         "

## 2021-06-29 NOTE — PROGRESS NOTES
06/29/21 1400   Quick Adds   Type of Visit Initial Occupational Therapy Evaluation   Living Environment   People in home facility resident   Current Living Arrangements assisted living   Home Accessibility no concerns   Transportation Anticipated family or friend will provide   Living Environment Comments pt lives at [facility]. Has A for meals, meds, cleaning, laundry. Pt otherwise IND with ADL's without AE.    Self-Care   Usual Activity Tolerance moderate   Current Activity Tolerance poor   Regular Exercise No   Equipment Currently Used at Home none   Activity/Exercise/Self-Care Comment INE at baseline. A for IADL's at facility    Disability/Function   Hearing Difficulty or Deaf no   Wear Glasses or Blind yes   Vision Management glasses   Concentrating, Remembering or Making Decisions Difficulty yes   Concentration Management per daughter, memory decline recently    Difficulty Communicating no   Difficulty Eating/Swallowing no   Walking or Climbing Stairs Difficulty no   Dressing/Bathing Difficulty no   Toileting issues no   Doing Errands Independently Difficulty (such as shopping) yes   Errands Management facility A    Fall history within last six months yes   Number of times patient has fallen within last six months 1   Change in Functional Status Since Onset of Current Illness/Injury yes   General Information   Onset of Illness/Injury or Date of Surgery 06/25/21   Patient/Family Therapy Goal Statement (OT) open to TCU    Additional Occupational Profile Info/Pertinent History of Current Problem  Now S/P left hip IM nailing on 6/26/21    Existing Precautions/Restrictions fall   Left Lower Extremity (Weight-bearing Status) non weight-bearing (NWB)   Cognitive Status Examination   Orientation Status person   Affect/Mental Status (Cognitive) WFL   Follows Commands WFL   Memory Deficit moderate deficit   Attention Deficit minimal deficit   Executive Function Deficit moderate deficit   Cognitive Status Comments per  daughter, memory decline.    Visual Perception   Visual Impairment/Limitations WNL   Sensory   Sensory Quick Adds No deficits were identified   Pain Assessment   Patient Currently in Pain Yes, see Vital Sign flowsheet   Integumentary/Edema   Integumentary/Edema no deficits were identifed   Posture   Posture kyphosis   Range of Motion Comprehensive   Comment, General Range of Motion not formally tested but appears WFL   Strength Comprehensive (MMT)   Comment, General Manual Muscle Testing (MMT) Assessment not formally tested but appears WFL   Muscle Tone Assessment   Muscle Tone Quick Adds No deficits were identified   Coordination   Upper Extremity Coordination No deficits were identified   Instrumental Activities of Daily Living (IADL)   IADL Comments facility A with all IADLs    Clinical Impression   Criteria for Skilled Therapeutic Interventions Met (OT) yes   OT Diagnosis dec IND with ADL's and transfers   OT Problem List-Impairments impacting ADL balance;activity tolerance impaired;cognition;pain;post-surgical precautions   Assessment of Occupational Performance 5 or more Performance Deficits   Identified Performance Deficits all ADLs and transfers   Planned Therapy Interventions (OT) ADL retraining;cognition;transfer training;strengthening   Clinical Decision Making Complexity (OT) low complexity   Therapy Frequency (OT) 5x/week   Predicted Duration of Therapy 7 days   Risk & Benefits of therapy have been explained current/potential barriers reviewed;risks/benefits reviewed;care plan/treatment goals reviewed;participants voiced agreement with care plan;patient   OT Discharge Planning    OT Discharge Recommendation (DC Rec) Transitional Care Facility   OT Rationale for DC Rec pt below functional baseline with ADLs and functional transfers. Pt now NWBing on LLE and unable to safety return to prior living situation therefore will need TCU to progress IND with ADLS and transfers.   OT Brief overview of current  status  Lift for transfers.    Total Evaluation Time (Minutes)   Total Evaluation Time (Minutes) 5

## 2021-06-29 NOTE — PROGRESS NOTES
[facility], [place], [place]  Internal Medicine Progress Note    Assessment and Plans:     Debi Morton is a 86 year old female admitted on 6/25/2021 for evaluation of left intra trochanteric fracture. She had a fall. No report or syncope or LOC. She has a PMHx significant for HTN, Osteoporosis, Dementia and Dyslipidemia.    Now S/P left hip IM nailing on 6/26/21     #Left intratrochanteric fracture  #S/P IM nailing on 6/26  -Ortho following the patient.   - PT/ OT as per protocol    adequate pain management   DVT prophylaxis with Lovenox    #HTN   #Dyslipimdemia   Resume home dose of Accupril. Hold HCTZ  -Continue on PTA Lipitor.      #Leucocytosis   -likely stress demargination. Improving.   -Most likely reactive. No signs of infection. UA negative for UTI.   -Continue trending.      #Dementia   -Sitter at bedside. Will trial off sitter today   -Continue on PTA Aricept     #Acute urinary retention   -Bladder scan showed > 500 ml.   -Lizarraga cath PRN.  -Has been voiding Ok for 24 hrs with PVR < 300      #Osteoporosis   -Continue on Vitamin D        Diet:   Regular Diet Adult    DVT Prophylaxis: Lovenox  Lizarraga Catheter: Not present  Central Lines: None  Code Status:  Full code      Disposition Plan     Expected discharge:HEIDED       Michael Carter MD, MPH.  Internal Medicine Attending  [place], [place]        Subjective:   Cinthya feels well this morning. Events and progress reviewed.   Daughter at bed side   No fever or chills  Eating and drinking well   No tingling/ numbness or weakness in the lower extremity     -Data reviewed today: I reviewed all new labs and imaging results over the last 24 hours.     Exam:         Temp: 97.6  F (36.4  C) Temp src: Oral BP: (!) 145/56 Pulse: 65   Resp: 18 SpO2: 97 % O2 Device: None (Room air)    Vitals:    06/26/21 0800   Weight: 72.6 kg (160 lb)     Vital  Signs with Ranges  Temp:  [97.2  F (36.2  C)-97.6  F (36.4  C)] 97.6  F (36.4  C)  Pulse:  [58-68] 65  Resp:  [16-20] 18  BP: ()/(56-67) 145/56  SpO2:  [91 %-97 %] 97 %  I/O last 3 completed shifts:  In: -   Out: 300 [Urine:300]    Constitutional: No distress noted, Alert, Answering questions appropriately  Respiratory: AE is good on both sides, no wheezing onr crackles  Cardiovascular: S1S2 normal, no new murmur  GI: Soft, non tender  Skin/Integumen: No rash      Medications     BUPivacaine 0.125% in On-Q pump (select flow) 14 mL/hr at 06/28/21 0022     - MEDICATION INSTRUCTIONS -       sodium chloride 100 mL/hr at 06/29/21 1025       acetaminophen  975 mg Oral TID     atorvastatin  10 mg Oral Daily     calcium carbonate  500 mg Oral Daily     cholecalciferol  50,000 Units Oral Weekly     cyanocobalamin  100 mcg Oral Daily     donepezil  10 mg Oral At Bedtime     enoxaparin ANTICOAGULANT  40 mg Subcutaneous Q24H     [Held by provider] hydrochlorothiazide  25 mg Oral Every Other Day     methocarbamol  500 mg Oral 4x Daily       Data   Recent Labs   Lab 06/28/21  0905 06/27/21  0622 06/25/21  1156 06/25/21  1005   WBC 14.0*  --  15.9*  --    HGB 9.0*  --  10.7*  --    MCV 91  --  94  --    * 605* 664*  --    INR  --   --   --  1.03     --   --  138   POTASSIUM 3.6  --   --  3.3*   CHLORIDE 105  --   --  103   CO2 29  --   --  28   BUN 16  --   --  29   CR 0.62 0.73  --  0.75   ANIONGAP 4  --   --  7   TAY 7.8*  --   --  9.3   GLC 99  --   --  106*       No results found for this or any previous visit (from the past 24 hour(s)).

## 2021-06-29 NOTE — PLAN OF CARE
"  VS: /57 (BP Location: Right arm)   Pulse 66   Temp 97.6  F (36.4  C) (Oral)   Resp 18   Ht 1.676 m (5' 6\")   Wt 72.6 kg (160 lb)   SpO2 97%   BMI 25.82 kg/m    LS clear , BS active   O2: 97% at RA   Output: Voiding small amounts of urine per flow sheet   Last BM: 6/29, had two small BM at each interval   Activity: Bedrest, Up with two assist liko lift on and off the commode   Skin: Surgical incisions on left LE   Pain: Managed with Scheduled Tylenol 975 mg  and Robaxin 500 mg, and  Ice packs for comfort. Has Q pump bupivacaine 0.2%, total infusion 14mL/hour. Shmuel from Anaesthesia pain service was here to clump the pump to see how pt tolerate with out it.In the meantime he reinforced Tegaderm to keep the needle in place.Writer noticed it was not clumped after daughter had questions about it. Writer notified RN on duty to call  and clarify on clumping of pump.   CMS: Alert with Dementia. Pt denies numbness or tingling   Dressing: CD&I   Diet: Regular diet, poor apatite. Pt reported feeling nausea, Zofran 4 mg given IV with relief. Ate all the Yogurt, a can of apple juice and a bite of omelet.   LDA: PIV in left arm patent with N/S at 100 ml/hr   Equipment: Liko lift   Plan: Continue to monitor and update changes   Additional Info: PCD's on bilat LE, Encouraged fluids and IS use.  NWB on LLE.  Daughter  from [place] and son (Perfecto) were here to visit       "

## 2021-06-29 NOTE — CONSULTS
Care Management Initial Consult    General Information  Assessment completed with: Marychuy (Daughter) and Jack (Son)  Type of CM/SW Visit: Initial Assessment  Primary Care Provider verified and updated as needed: No   Readmission within the last 30 days: no previous admission in last 30 days   Reason for Consult: discharge planning  Advance Care Planning: Advance Care Planning Reviewed: no concerns identified        Communication Assessment  Patient's communication style: spoken language (English or Bilingual)    Hearing Difficulty or Deaf: no   Wear Glasses or Blind: yes    Cognitive  Cognitive/Neuro/Behavioral: .WDL except, orientation  Level of Consciousness: confused, alert  Arousal Level: opens eyes spontaneously  Orientation: disoriented to, place, situation  Mood/Behavior: calm, cooperative  Best Language: 0 - No aphasia  Speech: clear    Living Environment:   People in home: Lives alone     Current living Arrangements: [facility]  Able to return to prior arrangements: No  Living Arrangement Comments: TCU is recommended    Family/Social Support:  Care provided by: Self  Provides care for: no one, unable/limited ability to care for self  Marital Status:   Description of Support System: Supportive    Support Assessment: Adequate family and caregiver support- Pt's children are supportive, Marychuy (Daughter) is currently in town from [place].    Current Resources:   Patient receiving home care services: No  Community Resources: None  Equipment currently used at home: None  Supplies currently used at home: None    Employment/Financial:  Employment Status: Retired     Financial Concerns: No concerns identified      Lifestyle & Psychosocial Needs:        Socioeconomic History     Marital status:      Spouse name: Not on file     Number of children: Not on file     Years of education: Not on file     Highest education level: Not on file     Tobacco Use     Smoking status:  Unknown If Ever Smoked   Substance and Sexual Activity     Alcohol use: Not Currently     Drug use: Never     Sexual activity: Never       Functional Status:  Prior to admission patient needed assistance:   Dependent ADLs:: Ambulation-no assistive device  Dependent IADLs:: Cleaning, Cooking, Laundry, Shopping, Meal Preparation, Medication Management, Transportation    Mental Health Status:  Mental Health Status: No Current Concerns       Chemical Dependency Status:  Chemical Dependency Status: No Current Concerns             Values/Beliefs:  Spiritual, Cultural Beliefs, Jewish Practices, Values that affect care: no               Care Management Follow Up    Length of Stay (days): 4  Expected Discharge Date: 06/30/21     Concerns to be Addressed: cognitive/perceptual, discharge planning     Patient plan of care discussed at interdisciplinary rounds: Yes    Anticipated Discharge Disposition: Transitional Care/ Memory Care     Anticipated Discharge Services: Therapies  Anticipated Discharge DME: None    Patient/family educated on Medicare website which has current facility and service quality ratings: yes  Education Provided on the Discharge Plan:    Patient/Family in Agreement with the Plan: yes    Referrals Placed by CM/SW: N/A  Private pay costs discussed: Not applicable    Additional Information:  Chart reviewed and discussed during MDRs. Pt is currently on a 1:1 sitter, will need TCU/ Memory Care.  1:1 sitter will likely need to be discontinued for 24 hours before placement can be found.  ROB met with Perfecto (Son) and Marychuy (Daughter) and completed the above assessment.  Pt's D[facility] has a Memory Care [facility] attached which they are considering moving pt to.  ROB discussed current recommendations for TCU, discussed Memory Care TCUs.  SW provided a Medicare list, Memory Care list as well as a list of facilities in network with [facility].  Family review the list and notify ROB of their preferences (suggested  identifying at least 5 facility preferences).    GREGORIA Wells, [facility]

## 2021-06-29 NOTE — PROGRESS NOTES
[facility]AL ANESTHESIA PAIN SERVICE CONTINUOUS NERVE INFUSION NOTE  June 29, 2021    SUBJECTIVE:  Interval History: Pt reports good pain control via continuous peripheral nerve block (CPNB) infusion.  Denies any weakness, paresthesias, circumoral numbness, metallic taste or tinnitus. Oxycodone 2.5 mg x 2 given in past 24 hrs. Block infusion held for 3 hrs without significant increase in pain. Catheter removed at 1400 today without complication. No evidence of local anesthetic adverse effects.    Numerical Rating Scale:  0/10 at rest and 4/10 with movement.      Antithrombotic/Thrombolytic Therapy ordered: Enoxaparin 40 mg q24hrs    Analgesic Medications:   Medications related to Pain Management (From now, onward)    Start     Dose/Rate Route Frequency Ordered Stop    06/28/21 0000  oxyCODONE (ROXICODONE) 5 MG tablet      2.5 mg Oral EVERY 4 HOURS PRN 06/28/21 1042      06/28/21 0000  acetaminophen (TYLENOL) 325 MG tablet      975 mg Oral EVERY 8 HOURS PRN 06/28/21 1305      06/28/21 0000  tiZANidine (ZANAFLEX) 4 MG tablet      4 mg Oral 3 TIMES DAILY PRN 06/28/21 1305      06/25/21 1400  bupivacaine 0.125 % in NaCl 0.9%, ON-Q C-Bloc select flow ([id number] holds 600-750 mL) single cath disposable pump 1 Device      14 mL/hr  Irrigation CONTINUOUS 06/25/21 1356      06/25/21 1205  acetaminophen (TYLENOL) tablet 975 mg      975 mg Oral 3 TIMES DAILY 06/25/21 1201      06/25/21 1205  methocarbamol (ROBAXIN) tablet 500 mg      500 mg Oral 4 TIMES DAILY 06/25/21 1201      06/25/21 1201  oxyCODONE IR (ROXICODONE) half-tab 2.5 mg      2.5 mg Oral EVERY 4 HOURS PRN 06/25/21 1201             Objective:  Lab results  Recent Labs   Lab Test 06/28/21  0905   WBC 14.0*   RBC 2.95*   HGB 9.0*   HCT 26.9*   MCV 91   MCH 30.5   MCHC 33.5   RDW 14.8   *       Lab Results   Component Value Date    INR 1.03 06/25/2021       Vitals:    Temp:  [36.4  C (97.5  F)-36.4  C (97.6  F)] 36.4  C (97.6  F)  Pulse:  [58-68] 66  Resp:  [16-18]  18  BP: ()/(56-67) 136/57  SpO2:  [91 %-97 %] 97 %    Exam:   GEN: Alert and no distress  NEURO/MSK: Appropriate sensory block corresponding to femoral nerve distribution, significant LLE quads weakness  SKIN: left fascia iliaca catheter site intact with dressing c/d/i, no tenderness, erythema, heme, edema    Assessment and Plan:     Debi Morton is a 86 year old female POD #4 s/p left femur ORIF and placement of a fascia iliaca block catheter. Adequate analgesia achieved.    - Block infusion held for 3 hours without worsening pain so catheter removed today at 1400. No complications.  - RAPS will sign off at this time  - Contact RAPS at the number below with any questions or concerns     Vj Calvert MD  [facility]  6/29/2021 2:27 PM     RAPS Contact Info (24 hour job code pager is the last 4 digits) For in-house use only:   [employer] phone: [place] [phone], [place] [phone], Peds [phone], then enter call-back number.    Text: Use [employer] on the Intranet <Paging/Directory> tab and enter Jobcode ID.   If no call back at any time, contact the hospital  and ask for RAPS attending or backup

## 2021-06-29 NOTE — PLAN OF CARE
VS: VSS, pt denied CP or SOB.   O2: Room air sat. > 90%   Output: Voiding small amount on bedside commode using sling lift.   Pt was getting up to commode frequently to use bathroom with assist, bladder scan was done just now and pt had 532 ml in her bladder, report given to on coming RN will continue to monitor and will get order to straight cath. The pt.    Last BM: 06/29/21 small BM.   Activity: Up to BSC using sling lift and assist of two,LLE NEB pt was able to pivot back to bed with assist of two, walker and transfer belt,    Skin: Intact except surgical incision.    Pain: Comfortably manageable with PRN medication.    CMS: CMS and neuro intact to baseline, pt confused but oriented to self.    Dressing: CDI   Diet: Regular tolerating okay.    LDA: PIV infusing.    Equipment: Walker, commode, IV pole and personal belongings.    Plan: TBD.   Additional Info: Pt off 1:1, bed alarm on for safety, pt monitored closely through the shift, family was at the bedside most of the day, pt resting in bed at this time call light within reach, will continue to monitor

## 2021-06-29 NOTE — PROGRESS NOTES
"VS: /56 (BP Location: Left arm)   Pulse 67   Temp 97.5  F (36.4  C) (Oral)   Resp 18   Ht 1.676 m (5' 6\")   Wt 72.6 kg (160 lb)   SpO2 97%   BMI 25.82 kg/m  Pt denies CP or SOB, lightheadedness, dizziness and nausea and vomiting.    O2: Room air sat. > 90%.    Output: Voiding adequate amount in commode without difficulty. Pt incontinent of B/B.  .   Last BM: 6/29/21   Activity: Bedrest. AX 2  With  Transfer.   Skin: Intact.Ex right hip and knee incision sites.   Pain: PRN Oxycodone administered for pain. Bupivacaine infusing.   CMS: CMS and neuro intact.Denies numbness and tingling. Alert to self.    Dressing: CDI   Diet: Tolerating regular diet.    LDA: PIV L infusing at 100 ml/hr and Q pump at 14 ml/hr   Equipment: IV pole, commode, walker and personal belongings.   Plan: Continue with plan of care. Call light within reach.   Additional Info: 1:1 Sitter at bedside for safety.     "

## 2021-06-29 NOTE — PROGRESS NOTES
Orthopaedic Surgery Progress Note 06/29/2021    Assessment: Debi Morton is a 86 year old female s/p L hip IM nail on 6/26/21 with Dr. Mckenzie. Doing well.     Doing well. No therapy note yesterday. Was little hypotensive, given bolus of fluid, hgb 9.0.     Goals: work w/ therapies today. Will likely return to her previous living facility if able. Podiatry consult pending.     Plan:  Orthopedics Primary  Activity: Up with assist.  Weight bearing status: NWB x6 weeks.  Antibiotics: clindamycin x24 hours perioperatively.  Diet: Begin with clear fluids and progress diet as tolerated.  DVT prophylaxis: lovenox in house, and 4 weeks aspirin 162mg QD and mechanical while in the hospital  Wound Care: Dressing change at bedside by Ortho on POD #3.  Pain management: transition from IV to orals as tolerated.   X-rays:  Obtain intraoperatively  Physical Therapy:  ROM, ADL's.  Occupational Therapy: ADL's.  Labs: Trend Hgb on POD #1  Consults: podiatry for toe nail trimming, Medicine for comanagement  Follow-up: Clinic with Dr. Mckenzie in 2 weeks or if going to nursing home then with Lexa  Disposition: Pending progress with therapies, pain control on orals, and medical stability, anticipate discharge to [facility] or previous facility  on POD #2-3.       Bud Branch MD  Orthopaedic Surgery Resident, PGY4      --------------------------------------------------------------------------------------------------------------------------------------------------------------------------------------------    S: Pain  controlled on meds. Appears comfortable. Tolerating po diet. Pleasantly confused. Bolus of fluid given yesterday due to hypotension.      O:  Temp: 97.5  F (36.4  C) Temp src: Oral BP: 139/56 Pulse: 67   Resp: 18 SpO2: 97 % O2 Device: None (Room air)      Exam:  Gen: No acute distress, resting comfortably in bed.  Resp: Non-labored breathing  MSK:  LLE:  - Dressings c/d/i. Small streak of strike through noted at the edge  of the dressing.  - SILT grossly throughout extremity  - Fires TA, EHL, FHL, GaSC  - DP pulses intact, foot wwp.    Recent Labs   Lab 06/28/21  0905 06/27/21  0622 06/25/21  1156   WBC 14.0*  --  15.9*   HGB 9.0*  --  10.7*   * 605* 664*     No results found for: SED

## 2021-06-30 NOTE — PROGRESS NOTES
Orthopaedic Surgery Progress Note 06/30/2021    Assessment: Debi Morton is a 86 year old female s/p L hip IM nail on 6/26/21 with Dr. Mckenzie. Doing well.     Required straight cath due to urinary retention x2.    Goals: work w/ therapies today. Will likely return to her previous living facility if able. Podiatry consult pending. Monitor urine output.    Appreciate medicine team for remaining cares.     Plan:  Orthopedics Primary  Activity: Up with assist.  Weight bearing status: NWB x6 weeks.  Antibiotics: clindamycin x24 hours perioperatively.  Diet: Begin with clear fluids and progress diet as tolerated.  DVT prophylaxis: lovenox in house, and 4 weeks aspirin 162mg QD and mechanical while in the hospital  Wound Care: Dressing change at bedside by Ortho on POD #3.  Pain management: transition from IV to orals as tolerated.   X-rays:  Obtain intraoperatively  Physical Therapy:  ROM, ADL's.  Occupational Therapy: ADL's.  Labs: Trend Hgb on POD #1  Consults: podiatry for toe nail trimming, Medicine for comanagement  Follow-up: Clinic with Dr. Mckenzie in 2 weeks or if going to nursing home then with Lexa  Disposition: Pending progress with therapies, pain control on orals, and medical stability, anticipate discharge to [facility] or previous facility  on POD #2-3.       Bud Branch MD  Orthopaedic Surgery Resident, PGY4      --------------------------------------------------------------------------------------------------------------------------------------------------------------------------------------------    S: Pain  controlled on meds. Appears comfortable. Pleasantly confused this am.     Required straight cath x2 due to urinary retention.      O:  Temp: 98.9  F (37.2  C) Temp src: Oral BP: 124/59 Pulse: 67   Resp: 16 SpO2: 94 % O2 Device: None (Room air)      Exam:  Gen: No acute distress, resting comfortably in bed.  Resp: Non-labored breathing  MSK:  LLE:  - Dressings c/d/i. Small streak of strike  through noted at the edge of the dressing.  - SILT grossly throughout extremity  - Fires TA, EHL, FHL, GaSC  -  foot wwp.    Recent Labs   Lab 06/30/21  0633 06/28/21  0905 06/27/21  0622 06/25/21  1156   WBC  --  14.0*  --  15.9*   HGB  --  9.0*  --  10.7*   * 685* 605* 664*     No results found for: SED

## 2021-06-30 NOTE — TELEPHONE ENCOUNTER
[facility] Call Center    Phone Message    May a detailed message be left on voicemail: yes     Reason for Call: Other: This pt of Dr. Mckenzie's son called to speak with someone on Dr. Mckenzie's care team. This pt's son stated the pt had surgery with Dr. Mckenzie last week. (I see no record of a visit or surgery in the chart.) The pt's son is concerned that his mother is not having PT at the appropriate intervals + is requesting that someone on Dr. Mckenzie's care team reach out to provide assistance or guidance for who to address this issue. Additionally, the pt's son stated that Dr. Mckenzie placed an order to podiatry for the pt to have her nails trimmed. The pt's son stated that this has not yet happened, he is wondering in what time frame that occurs. Please have someone on Dr. Mckenzie's care team check with Dr. Mckenzie regarding this pt and these concerns. Please reach back out to this pt's son at [phone].    Action Taken: Message routed to:  Clinic and Surgery Center: Ortho    Travel Screening: Not Applicable

## 2021-06-30 NOTE — PLAN OF CARE
"  VS: VSS, pt alert and oriented to self only, denied CP or SOB.    O2: Room air sat. > 90%.   Output: Pt voided very small amount on BSC,straight cathter done about 07 pm for 300 ml, and UA/UC sent to lab per order.   Last BM: 06/29/21 small amount.    Activity: Up to chair and up on WC most of the day.   Skin: Intact except surgical incision.    Pain: Comfortably manageable   CMS: CMS and neuro intact to baseline.    Dressing: CDI.    Diet: Regular tolerating.    LDA: None.    Equipment: Walker, commode, IV pole and personal belongings.    Plan: TBD.    Additional Info: Please encourage fluid pt is not drinking enough stating \" I don't went to go to bathroom a lot\", pt resting in bed, bed alarm on for safety, call light in reach, will encourage fluid and continue to monitor,         "

## 2021-06-30 NOTE — PLAN OF CARE
VS: Stable except hypertensive, /57.   O2: RA.   Output: UTV, straight cathed x2, last straight cathed with 325 out at 0551.   Last BM: 6/29 small, senna and miralax given.   Activity: Ax2 for cares and repositioning, NWB.   Skin: Incision, scab left leg, bruises left hip.   Pain: Left hip and leg pain managed with oxycodone, scheduled tylenol and robaxin, pt declined ice.   CMS: Intact.   Dressing: CDI.   Diet: Regular.   LDA: PIV left lower forearm infusing NS at 100 ml/hr.   Equipment: Bed alarm, IV pole, IS, PCDs, call light within reach.   Plan: Continue to monitor.   Additional Info: Disoriented to time, place, situation. Repeated questions, forgetfulness.    Pt reporting drainage from her nose down the back of her throat. IS/cough encouraged, pt spitting in cup.

## 2021-06-30 NOTE — PROGRESS NOTES
[facility],   Internal Medicine Progress Note    Assessment and Plans:     Debi Morton is a 86 year old female admitted on 6/25/2021 for evaluation of left intra trochanteric fracture. She had a fall. No report or syncope or LOC. She has a PMHx significant for HTN, Osteoporosis, Dementia and Dyslipidemia.    Now S/P left hip IM nailing on 6/26/21     #Left intratrochanteric fracture  #S/P IM nailing on 6/26  -Ortho following the patient.   - PT/ OT as per protocol    adequate pain management   DVT prophylaxis with Lovenox    #HTN   #Dyslipimdemia   Resume home dose of Accupril.   hydrochlorothiazide to be resumed from 7/1  -Continue on PTA Lipitor.      #Leucocytosis   -likely stress demargination. Improving.   -Most likely reactive. No signs of infection. UA negative for UTI.   -Repeat UA in the setting of waxing and waning encephalopathy      #Dementia   #Worseining encephalopathy   -Sitter at bedside. We should being trailing off sitter    -Continue on PTA Aricept  -Long dissuasion with son and daughter with regards to course of demential and encephalopathy in the immediate post operative period  They hoped to have a geriatrician involved, and Dr Krishna Viera kindly agreed to see patient and discuss with family  It I likely that in the best case scenario, patient should go to memory care NH with rehabilitation        #Acute urinary retention   -Bladder scan showed > 500 ml.   -Lizarraga cath PRN.  -Has been voiding Ok for 24 hrs with PVR < 300   - Recheck UA     #Osteoporosis   -Continue on Vitamin D        Diet:   Regular Diet Adult    DVT Prophylaxis: Lovenox  Lizarraga Catheter: Not present  Central Lines: None  Code Status:  Full code      Disposition Plan     Expected discharge:BRIAN Carter MD, MPH.  Internal Medicine Attending  [place], [place]        Subjective:    Cinthya  Had an uneventful night   Slow progress with therapy, which is also limited due to NWB on the Left lower extremity   No fever or chills  Pleasant mood   Eating and drinking well   Son and daughter at bedside     -Data reviewed today: I reviewed all new labs and imaging results over the last 24 hours.     Exam:         Temp: 98.9  F (37.2  C) Temp src: Oral BP: 124/59 Pulse: 67   Resp: 16 SpO2: 94 % O2 Device: None (Room air)    Vitals:    06/26/21 0800   Weight: 72.6 kg (160 lb)     Vital Signs with Ranges  Temp:  [97.4  F (36.3  C)-98.9  F (37.2  C)] 98.9  F (37.2  C)  Pulse:  [66-68] 67  Resp:  [16-17] 16  BP: (124-143)/(48-59) 124/59  SpO2:  [94 %-96 %] 94 %  I/O last 3 completed shifts:  In: 120 [P.O.:120]  Out: 1125 [Urine:1125]    Constitutional: No distress noted, Alert, Answering questions appropriately  Respiratory: AE is good on both sides, no wheezing onr crackles  Cardiovascular: S1S2 normal, no new murmur  GI: Soft, non tender  Skin/Integumen: No rash  Awake and alert. Moving all 4 limbs       Medications     BUPivacaine 0.125% in On-Q pump (select flow) 14 mL/hr at 06/28/21 0022     - MEDICATION INSTRUCTIONS -       sodium chloride 100 mL/hr at 06/29/21 2230       acetaminophen  975 mg Oral TID     atorvastatin  10 mg Oral Daily     calcium carbonate  500 mg Oral Daily     cholecalciferol  50,000 Units Oral Weekly     cyanocobalamin  100 mcg Oral Daily     donepezil  10 mg Oral At Bedtime     enoxaparin ANTICOAGULANT  40 mg Subcutaneous Q24H     hydrochlorothiazide  25 mg Oral Every Other Day     lisinopril  40 mg Oral Daily     methocarbamol  500 mg Oral 4x Daily     polyethylene glycol  17 g Oral Daily       Data   Recent Labs   Lab 06/30/21  0633 06/28/21  0905 06/27/21  0622 06/25/21  1156 06/25/21  1005 06/25/21  1005   WBC  --  14.0*  --  15.9*  --   --    HGB  --  9.0*  --  10.7*  --   --    MCV  --  91  --  94  --   --    * 685* 605* 664*   < >  --    INR  --   --   --   --    --  1.03   NA  --  138  --   --   --  138   POTASSIUM  --  3.6  --   --   --  3.3*   CHLORIDE  --  105  --   --   --  103   CO2  --  29  --   --   --  28   BUN  --  16  --   --   --  29   CR 0.62 0.62 0.73  --   --  0.75   ANIONGAP  --  4  --   --   --  7   TAY  --  7.8*  --   --   --  9.3   GLC  --  99  --   --   --  106*    < > = values in this interval not displayed.       No results found for this or any previous visit (from the past 24 hour(s)).

## 2021-06-30 NOTE — PLAN OF CARE
"  VS: /56 (BP Location: Right arm)   Pulse 67   Temp 98.9  F (37.2  C) (Oral)   Resp 16   Ht 1.676 m (5' 6\")   Wt 72.6 kg (160 lb)   SpO2 94%   BMI 25.82 kg/m       O2: 94% RA   Output: Bladder scan 307 mL ar 1230.   Last BM: 6/29/2021. Miralax and senna given.   Activity: Non-weight bearing on LLE. Chairfast, up with 2 assist.   Up for meals? In chair   Skin: Scabs from surgical incisions on LLE   Pain: 0/10 at rest   CMS: WDL   Dressing: Clean and dry   Diet: Regular   LDA: Saline locked PIV on L forearm   Equipment: Bed alarm, chair alarm, walker, call light   Plan: Continue to monitor   Additional Info: Disoriented to time and situation. Repeated questions and forgetfulness. 3 children present.         "

## 2021-07-01 NOTE — PROGRESS NOTES
[facility], [place], [place]  Internal Medicine Progress Note    Assessment and Plans:     Debi Morton is a 86 year old female admitted on 6/25/2021 for evaluation of left intra trochanteric fracture. She had a fall. No report or syncope or LOC. She has a PMHx significant for HTN, Osteoporosis, Dementia and Dyslipidemia.    Now S/P left hip IM nailing on 6/26/21     #Left intratrochanteric fracture  #S/P IM nailing on 6/26  -Ortho following the patient.   - PT/ OT as per protocol    adequate pain management   DVT prophylaxis with Lovenox    #HTN   #Dyslipimdemia   Resume home dose of Accupril.   hydrochlorothiazide  resumed  7/1  -Continue on PTA Lipitor.      #Leucocytosis   -likely stress demargination. Improving.   -Most likely reactive. No signs of infection. UA negative for UTI.   -Repeat UA in the setting of waxing and waning encephalopathy with no evidence of infection. Await culture      #Dementia   #Worseining encephalopathy   -Sitter at bedside. We should being trailing off sitter    -Continue on PTA Aricept  -Long dissuasion with son and daughter with regards to course of demential and encephalopathy in the immediate post operative period  They hoped to have a geriatrician involved, and Dr Krishna Viera kindly agreed to see patient and discuss with family  It I likely that in the best case scenario, patient should go to memory care NH with rehabilitation        #Acute urinary retention   -Bladder scan showed > 500 ml.   -Lizarraga cath PRN.  -Has been voiding Ok for 24 hrs with PVR < 300        #Osteoporosis   -Continue on Vitamin D        Diet:   Regular Diet Adult    DVT Prophylaxis: Lovenox  Lizarraga Catheter: Not present  Central Lines: None  Code Status:  Full code      Disposition Plan     Expected discharge:Per primary team       Michael Carter MD, MPH.  Internal Medicine Attending  [facility]  [place], [place]        Subjective:   No acute events overnight  Patient had just had a shower when I went to see her  No new complaints   Family at bedside  Baseline forgetfulness and confusion  Denies chest pain or SOB    -Data reviewed today: I reviewed all new labs and imaging results over the last 24 hours.     Exam:         Temp: 97.7  F (36.5  C) Temp src: Oral BP: 136/54 Pulse: 67   Resp: 16 SpO2: 97 % O2 Device: None (Room air)    Vitals:    06/26/21 0800   Weight: 72.6 kg (160 lb)     Vital Signs with Ranges  Temp:  [97.6  F (36.4  C)-97.8  F (36.6  C)] 97.7  F (36.5  C)  Pulse:  [67-75] 67  Resp:  [16] 16  BP: (116-136)/(53-60) 136/54  SpO2:  [96 %-97 %] 97 %  No intake/output data recorded.    Constitutional: No distress noted, Alert, Answering questions appropriately  Respiratory: AE is good on both sides, no wheezing onr crackles  Cardiovascular: S1S2 normal, no new murmur  GI: Soft, non tender  Skin/Integumen: No rash  Awake and alert. Moving all 4 limbs       Medications     BUPivacaine 0.125% in On-Q pump (select flow) 14 mL/hr at 06/28/21 0022     - MEDICATION INSTRUCTIONS -       sodium chloride 100 mL/hr at 06/29/21 2230       acetaminophen  975 mg Oral TID     atorvastatin  10 mg Oral Daily     calcium carbonate  500 mg Oral Daily     cholecalciferol  50,000 Units Oral Weekly     cyanocobalamin  100 mcg Oral Daily     donepezil  10 mg Oral At Bedtime     enoxaparin ANTICOAGULANT  40 mg Subcutaneous Q24H     hydrochlorothiazide  25 mg Oral Every Other Day     lisinopril  40 mg Oral Daily     methocarbamol  500 mg Oral 4x Daily     polyethylene glycol  17 g Oral Daily       Data   Recent Labs   Lab 06/30/21  0633 06/28/21  0905 06/27/21  0622 06/25/21  1156 06/25/21  1005 06/25/21  1005   WBC  --  14.0*  --  15.9*  --   --    HGB  --  9.0*  --  10.7*  --   --    MCV  --  91  --  94  --   --    * 685* 605* 664*   < >  --    INR  --   --   --   --   --  1.03   NA  --  138  --   --   --   138   POTASSIUM  --  3.6  --   --   --  3.3*   CHLORIDE  --  105  --   --   --  103   CO2  --  29  --   --   --  28   BUN  --  16  --   --   --  29   CR 0.62 0.62 0.73  --   --  0.75   ANIONGAP  --  4  --   --   --  7   TAY  --  7.8*  --   --   --  9.3   GLC  --  99  --   --   --  106*    < > = values in this interval not displayed.       No results found for this or any previous visit (from the past 24 hour(s)).

## 2021-07-01 NOTE — CONSULTS
"Assessment: Debi is a an 86-year-old with elongated toenails.  She is with her daughter today.  She is on the floor today for left intertrochanteric fracture.  She relates that her nails are quite long for her.    Plan:  -Patient seen and evaluated.  -Nails trimmed x10.  -No further treatment needed.  If any questions please call Ortho resident on-call.    HPI: Debi is an 86-year-old who is in the hospital for evaluation for left intertrochanteric fracture.  This is status post a fall.  She is with her daughter today at bedside.  She relates that she does not have pain in the nails, but they are very difficult for her to trim as her very long.    Past Medical History:   Diagnosis Date     Dementia (H)      Gastroesophageal reflux disease      Hypercalcemia      Hypertension      Lipidemia      Osteoporosis      Venous insufficiency      Vitamin D deficiency      Past Surgical History:   Procedure Laterality Date     OPEN REDUCTION INTERNAL FIXATION RODDING INTRAMEDULLARY FEMUR Left 6/26/2021    Procedure: LEFT OPEN REDUCTION INTERNAL FIXATION, FRACTURE, FEMUR, USING INTRAMEDULLARY MICHELINE;  Surgeon: Elizabeth Mckenzie MD;  Location:  OR     Social History     Social History Narrative     Not on file        Allergies   Allergen Reactions     Penicillins Unknown     Objective:  Vital signs:  Temp: 97.7  F (36.5  C) Temp src: Oral BP: 136/54 Pulse: 67   Resp: 16 SpO2: 97 % O2 Device: None (Room air) Oxygen Delivery: 1 LPM Height: 167.6 cm (5' 6\") Weight: 72.6 kg (160 lb)(estimated)  Estimated body mass index is 25.82 kg/m  as calculated from the following:    Height as of this encounter: 1.676 m (5' 6\").    Weight as of this encounter: 72.6 kg (160 lb).        DP and PT pulses are 1/4 bilaterally.  Capillary refill time is 1 second.  Gross sensation is intact.  Equinus is noted bilaterally.  Nails are elongated and dystrophic x10 bilaterally.  "

## 2021-07-01 NOTE — PLAN OF CARE
VS: VSS, pt denied CP or SOB.   O2: Room air sat. 96%.   Output: Voiding small amount on BSE, large pvr noted and straight cath. done x one this afternoon ( see flow sheet ).    Last BM: 07/01/21 passing gas.   Activity: Up to chair most of the day and up to BSC.    Skin: Intact except surgical incision.    Pain: Comfortably manageable with PRN medication.   CMS: CMS and neuro intact to baseline, pt confused alert to self only.    Dressing: CDI.    Diet: Regular tolerating okay.    LDA: None.    Equipment: Walker, commode, and personal belongings.    Plan: TBD.    Additional Info:

## 2021-07-01 NOTE — TELEPHONE ENCOUNTER
RN was able to find patient's other chart due to having a different MRN. RN called and spoke with Emeka, patient's son. After a long conversation, it turns patient is in-patient in the [facility] per Emeka. RN was then gently advise Emeka to reach out to the staff in [facility] for any questions or concerns in regards to PT and podiatry order. As far as RN can see, patient is going to have a PT consult later today at 11.15am. Emeka then expressed understanding.    Abbey Renee RN

## 2021-07-01 NOTE — PROGRESS NOTES
Orthopaedic Surgery Progress Note 07/01/2021    Assessment: Debi Morton is a 86 year old female s/p L hip IM nail on 6/26/21 with Dr. Mckenzie. Doing well.     Doing well. Able to sit-up in chair yesterday per nursing notes. Incision c/d/i.    Goals: Working w/ therapies today. Will likely return to her previous living facility if able. Podiatry consult pending. Monitor urine output.    Appreciate medicine team for remaining cares.     Plan:  Orthopedics Primary  Activity: Up with assist.  Weight bearing status: TTWB x6 weeks.  Antibiotics: complete  Diet: Begin with clear fluids and progress diet as tolerated.  DVT prophylaxis: lovenox in house, and 4 weeks aspirin 162mg QD and mechanical while in the hospital  Wound Care: dressing changed 7/1/21  Pain management: transition from IV to orals as tolerated.   X-rays:  Obtain intraoperatively  Physical Therapy:  ROM, ADL's.  Occupational Therapy: ADL's.  Labs: Trend Hgb on POD #1  Consults: podiatry for toe nail trimming, Medicine for comanagement  Follow-up: Clinic with Dr. Mckenzie in 2 weeks or if going to nursing home then with Lexa  Disposition: Pending progress with therapies, pain control on orals, and medical stability, anticipate discharge to [facility] or previous facility  on POD #2-3.       Bud Branch MD  Orthopaedic Surgery Resident, PGY4      --------------------------------------------------------------------------------------------------------------------------------------------------------------------------------------------    S: Pain  controlled on meds. Appears comfortable. Pleasantly confused this am.       O:  Temp: 97.7  F (36.5  C) Temp src: Oral BP: 136/54 Pulse: 67   Resp: 16 SpO2: 97 % O2 Device: None (Room air)      Exam:  Gen: No acute distress, resting comfortably in bed.  Resp: Non-labored breathing  MSK:  LLE:  - incision c/d/i.   - SILT grossly throughout extremity  - Fires TA, EHL, FHL, GaSC  -  foot wwp.    Recent Labs   Lab  06/30/21  0633 06/28/21  0905 06/27/21  0622 06/25/21  1156   WBC  --  14.0*  --  15.9*   HGB  --  9.0*  --  10.7*   * 685* 605* 664*     No results found for: SED

## 2021-07-01 NOTE — PROGRESS NOTES
Care Management Follow Up    Length of Stay (days): 6    Expected Discharge Date: 07/02/21     Concerns to be Addressed: cognitive/perceptual, discharge planning     Patient plan of care discussed at interdisciplinary rounds: Yes    Anticipated Discharge Disposition: Transitional Care     Anticipated Discharge Services: None  Anticipated Discharge DME: None    Patient/family educated on Medicare website which has current facility and service quality ratings: yes  Education Provided on the Discharge Plan:  yes  Patient/Family in Agreement with the Plan: yes    Referrals Placed by CM/SW: Post Acute Facilities  Private pay costs discussed: Not applicable    Additional Information:  Per team rounds, the pt is medically ready for discharge.     SW called and spoke with Marisol and Perfecto, the pt's children regarding TCU placement.  SW answered their questions about the process.  Marisol relayed that the pt is fully vaccinated against COVID-19 and [facility] would have that information (SW is not able to access [facility] because there is a merge issue with the chart).  Marisol relayed the pt's PCP is Dr. Davis Temple who follows at [facility].     The family is agreeable with TCU placement and provided choices (see below).  Marisol relayed that the family would like to transport at discharge.     ROB made TCU referrals via [employer].     PENDING REFERRALS  [facility] (Top choice)  [address] [place] [address]  Phone: [phone]  Fax: [phone]    [facility] (Second choice)  [address]  [facility] [address]  Phone: [phone]  Fax: [phone]    [facility]  [address] [address] [place] [address]  Phone: [phone]  Fax: [phone]    [facility]  [address] [facility] [address]  Phone: [phone]  Fax: [phone]    [facility]  [address] [place] [address]  Phone: [phone]  Fax: [phone]    [facility]   [address] [address]  Phone: [phone]  Fax: [phone]  Note: This facility is not the family's first choice, they are open to it because it has a memory care component if the other facilities deny.       Rafaela Briseno [facility]  5 Ortho & 8A   Ph: [phone]  Pager: [phone]

## 2021-07-01 NOTE — PLAN OF CARE
"  VS: /53 (BP Location: Right arm)   Pulse 71   Temp 97.8  F (36.6  C) (Oral)   Resp 16   Ht 1.676 m (5' 6\")   Wt 72.6 kg (160 lb)   SpO2 96%   BMI 25.82 kg/m    Pt denies chest pain.    O2: >90% on room air.    Output: Pt has oliguria. Fluids promoted. Bladder scan at 0436 was 351   Last BM: 6/30/21 small hard BM in brief this AM.    Activity: Assist x 2 with walker and gait belt. Pt is TTWB as tolerated to LLE.    Up for meals? N/A   Skin: L hip incisions   Pain: Managed with scheduled Tylenol.    CMS: Intact. Pt denies numbness or tingling.    Dressing: Primapore dressings changed this AM per Ortho residents request.    Diet: Regular   LDA: No IV access   Equipment: IV pole/pump, PCD's, and pt belongings.    Plan: Pt to discharge to TCU pending placement.    Additional Info: Bed alarm on for safety.        "

## 2021-07-02 NOTE — PROGRESS NOTES
"[facility], [place]   Internal Medicine Daily Note           Interval History/Events     Hand off taken from Dr. Rodriguez  No nausea, vomiting  No chest pain, shortness of breath  No fever, chills.        Review of Systems        4 point ROS including Respiratory, CV, GI and , other than that noted above is negative      Medications   I have reviewed current medications  in the \"current medication\" section of Epic.  Relevant changes include:     Physical Exam   General:       Vital signs:    Blood pressure 127/57, pulse 73, temperature 98.4  F (36.9  C), resp. rate 16, height 1.676 m (5' 6\"), weight 72.6 kg (160 lb), SpO2 96 %.  Estimated body mass index is 25.82 kg/m  as calculated from the following:    Height as of this encounter: 1.676 m (5' 6\").    Weight as of this encounter: 72.6 kg (160 lb).      Intake/Output Summary (Last 24 hours) at 7/2/2021 1439  Last data filed at 7/2/2021 1300  Gross per 24 hour   Intake --   Output 200 ml   Net -200 ml        Constitutional: Laying in bed in no acute distress  Eye: No icterus, no pallor  Mouth/ENT: Normal oral mucosa  Cardiovascular:  S1, S2 normal.   Respiratory: B/L CTA  GI: Soft, NT, BS+  : No gutierrez's catheter  Neurology: Alert, awake, and oriented.   Psych: Mood stable.   MSK:   Integumentary:   Heme/Lymph/Imm:      Laboratory and Imaging Studies     I have reviewed  laboratory and imaging studies in the Epic. Pertinent findings are as below:    BMP  Recent Labs   Lab 06/30/21 0633 06/28/21 0905 06/27/21 0622   NA  --  138  --    POTASSIUM  --  3.6  --    CHLORIDE  --  105  --    TAY  --  7.8*  --    CO2  --  29  --    BUN  --  16  --    CR 0.62 0.62 0.73   GLC  --  99  --      CBC  Recent Labs   Lab 06/30/21 0633 06/28/21 0905 06/27/21 0622   WBC  --  14.0*  --    RBC  --  2.95*  --    HGB  --  9.0*  --    HCT  --  26.9*  --    MCV  --  91  --    MCH  --  30.5  --    MCHC  --  33.5  --    RDW  --  14.8  --    * 685* " 605*     INRNo lab results found in last 7 days.  LFTsNo lab results found in last 7 days.   PANCNo lab results found in last 7 days.        Impression/Plan        86 year old female admitted on 6/25/2021 for evaluation of left intra trochanteric fracture. She had a fall. No report or syncope or LOC. She has a PMHx significant for HTN, Osteoporosis, Dementia and Dyslipidemia.    Now S/P left hip IM nailing on 6/26/21      # Left intratrochanteric fracture  # S/P IM nailing on 6/26  -Ortho following the patient.   - PT/ OT as per protocol    adequate pain management   DVT prophylaxis with Lovenox     # HTN   # Dyslipimdemia   Resume home dose of Accupril.   hydrochlorothiazide  resumed  7/1  -Continue on PTA Lipitor.      #Leucocytosis   -likely stress demargination. Improving.   No signs of infection. UA negative for UTI.   -Repeat UA in the setting of waxing and waning encephalopathy with no evidence of infection. Await culture      # Dementia   # Worseining encephalopathy   Had bedside attendant placed, now removed.   Continue on PTA Aricept  Long discussion  with son and daughter with regards to course of dementia and encephalopathy in the immediate post operative period. They hoped to have a geriatrician involved, and Dr Krishna Viera kindly agreed to see patient and discuss with family. It I likely that in the best case scenario, patient should go to memory care NH with rehabilitation         # Acute urinary retention   Post operative. Straight cath required yesterday.   Discussed with patient and family in detail  - Check PVR, straight cath for PVR > 350 ml  - If > 3 straight cath in 24 hours period, then placed Lizarraga's catheter.         # Osteoporosis   -Continue on Vitamin D        # Diet:   Regular Diet Adult    # DVT Prophylaxis: Lovenox  # Lizarraga Catheter: Not present  # Central Lines: None           Pt's care was discussed with bedside RN, patient and  during Care Team Rounds.               Sai Villar  MD  Hospitalist ( Internal medicine)  Pager: [phone]   }

## 2021-07-02 NOTE — PROGRESS NOTES
Orthopaedic Surgery Progress Note 07/02/2021    Assessment: Debi Morton is a 86 year old female s/p L hip IM nail on 6/26/21 with Dr. Mckenzie. Doing well.    Goals: Awaiting TCU placement   Appreciate medicine team assistance with cares.     Plan:  Orthopedics Primary  Activity: Up with assist.  Weight bearing status: TTWB x6 weeks.  Antibiotics: complete  Diet: Begin with clear fluids and progress diet as tolerated.  DVT prophylaxis: lovenox in house, and 4 weeks aspirin 162mg QD and mechanical while in the hospital  Wound Care: dressing changed 7/1/21  Pain management: transition from IV to orals as tolerated.   X-rays:  Obtain intraoperatively  Physical Therapy:  ROM, ADL's.  Occupational Therapy: ADL's.  Labs: Trend Hgb on POD #1  Consults: podiatry for toe nail trimming, Medicine for comanagement  Follow-up: Clinic with Dr. Mckenzie in 2 weeks or if going to nursing home then with Lexa  Disposition: Pending progress with therapies, pain control on orals, and medical stability, anticipate discharge to TCU pending placement.       Yvette Moreno MD  Orthopaedic Surgery, PGY-4  [phone]       --------------------------------------------------------------------------------------------------------------------------------------------------------------------------------------------    S: Pain  controlled on meds. Appears comfortable. Pleasantly confused this am. Elevated PVR. Evaluated by Podiatry yesterday; nails trimmed.  Plan to discharge to TCU pending placement.     O:  Temp: 97.9  F (36.6  C) Temp src: Oral BP: 125/52 Pulse: 74   Resp: 16 SpO2: 97 % O2 Device: None (Room air)      Exam:  Gen: No acute distress, resting comfortably in bed.  Resp: Non-labored breathing  MSK:  LLE:  - incision c/d/i.   - SILT throughout sural, saphenous, tibial, DP and SP nerve distributions   - Fires TA, EHL, FHL, GaSC  -  foot wwp.    Recent Labs   Lab 06/30/21  0633 06/28/21  0905 06/27/21  0622 06/25/21  1152    WBC  --  14.0*  --  15.9*   HGB  --  9.0*  --  10.7*   * 685* 605* 664*     No results found for: SED

## 2021-07-02 NOTE — PROGRESS NOTES
Care Management Follow Up    Length of Stay (days): 7    Expected Discharge Date: 07/02/21     Concerns to be Addressed: cognitive/perceptual, discharge planning     Patient plan of care discussed at interdisciplinary rounds: Yes    Anticipated Discharge Disposition: Transitional Care     Anticipated Discharge Services: None  Anticipated Discharge DME: None    Patient/family educated on Medicare website which has current facility and service quality ratings: yes  Education Provided on the Discharge Plan:  yes  Patient/Family in Agreement with the Plan: yes    Referrals Placed by CM/SW: Post Acute Facilities  Private pay costs discussed: Not applicable    Additional Information:  Per team rounds, the pt is medically ready for discharge. SW followed up on TCU referrals.    SW met with the pt and her daughters Marychuy and Marisol at bedside to provide update.  SW answered questions about the TCU.  Marychuy and Marisol were wondering if the pt's current urinary retention would be a barrier to TCU placement, SW stated that it might be, but that the TCUs will review and see if they can meet her needs. SW also relayed that the pt's [facility] insurance will require a prior auth, that coupled with a holiday weekend, it is possible the pt may be hospitalized through the weekend if there is no accepting facility today.  SW will continue to follow for placement.     ADDEND 1535: After receiving many denials on TCU because of the pt's insurance, SW printed off the list of [facility] in-network TCUs from [facility]'s website and provided it to the pt's daughters Marychuy and Marisol at bedside (this list was provided earlier this week by ROB, but SW provided again). ROB also provided them with a list of the TCU's that have been already tried and denied.  ROB asked them to identify 4-5 more preferences from [facility]'s list and SW will ask that the weekend SW follow up with them, they agreed to do so.     PENDING REFERRALS  [facility] (Second choice)  [address]  [place]  [place] [address]  Phone: [phone]  Fax: [phone]  7/2: SW left a VM for admissions, requesting a call back to discuss referral. SW requested that if the facility can take the pt over the weekend, they call the unit and ask to speak with the on call SW.      [facility]  [address] [address] [place] [address]  Phone: [phone]  Fax: [phone]  7/2: SW left a VM for Nikki in admissions requesting a call back to discuss referral. SW requested that if the facility can take the pt over the weekend, they call the unit and ask to speak with the on call SW. SW resent SNF referral as Nikki's VM indicated an updated fax number (noted above).     [facility]  [address]  [place] [address]  Phone: [phone]  Fax: [phone]  7/2: SW left a VM with Nikki in admissions (ph: [phone]) requesting a call back to discuss referral. Nikki called back to relay they are out of network for [facility], so they cannot accept.        DECLINED  [facility] (Top choice)  [address] [place] [place] [address]  Phone: [phone]  Fax: [phone]  7/2: SW rec'd a VM from [facility] - they do not have any beds until at least mid-next week, they are declining for admission.     [facility]  [address] [facility] [place] [address]  Phone: [phone]  Fax: [phone]  7/2: SW spoke with Maddy in admissions - they are NOT in network with [facility], so they cannot accept.     [facility]   [address] [facility] [address]  Phone: [phone], option 3  Fax: [phone]  Note: This facility is not the family's first choice, they are open to it because it has a memory care component if the other facilities deny.   7/2: SW spoke with Tory in admissions, they are out of network for [facility], so they cannot accept.     [facility]  [address]  [place] [address]  Phone: [phone]  Fax: [phone]  7/2: SW  left a VM with Nikki in admissions (ph: [phone]) requesting a call back to discuss referral. Nikki called back to relay they are out of network for [facility], so they cannot accept.          Rafaela Briseno [facility]  5 Ortho & 8A   Ph: [phone]  Pager: [phone]

## 2021-07-02 NOTE — PLAN OF CARE
Pt A&O x's 4 but forgetful. VSS. Afebrile. 02 sats in the 90s on RA. Lungs clear. Denies SOB, CP and nausea. Tolerating regular diet. Bowel sound active in all quadrants. Pt had very small  BM, passing gas. Voiding  very small amount into bedside commode. Bed alarm activated, pt does not use a call light. Pt educated on how to use the call light. Pain managed with scheduled pain regimen. CMS intact, denies N/T. Right hip dressing clean, dry and intact.PIV patent and SL. Pt is able to make needs known, call light with in reach. Will continue to monitor.     PVR this mroning over 400 ml

## 2021-07-03 NOTE — PROGRESS NOTES
"[facility], [place]   Internal Medicine Daily Note           Interval History/Events     Overnight events reviewed  Feeling tired currently after working with therapy and wants to go back to bed  Had BM yesterday  Straight cath at 4 PM today   No nausea, vomiting  No chest pain, shortness of breath  No fever, chills.        Review of Systems        4 point ROS including Respiratory, CV, GI and , other than that noted above is negative      Medications   I have reviewed current medications  in the \"current medication\" section of Epic.  Relevant changes include:     Physical Exam   General:       Vital signs:    Blood pressure 122/49, pulse 66, temperature 98  F (36.7  C), temperature source Oral, resp. rate 16, height 1.676 m (5' 6\"), weight 72.6 kg (160 lb), SpO2 96 %.  Estimated body mass index is 25.82 kg/m  as calculated from the following:    Height as of this encounter: 1.676 m (5' 6\").    Weight as of this encounter: 72.6 kg (160 lb).      Intake/Output Summary (Last 24 hours) at 7/2/2021 1439  Last data filed at 7/2/2021 1300  Gross per 24 hour   Intake --   Output 200 ml   Net -200 ml        Constitutional: Laying in bed in no acute distress  Eye: No icterus, no pallor  Mouth/ENT: Normal oral mucosa  Cardiovascular:  S1, S2 normal.   Respiratory: B/L CTA  GI: Soft, NT, BS+  : No gutierrez's catheter  Neurology: Alert, awake, and oriented.   Psych: Mood stable.   MSK:   Integumentary:   Heme/Lymph/Imm:      Laboratory and Imaging Studies     I have reviewed  laboratory and imaging studies in the Epic. Pertinent findings are as below:    BMP  Recent Labs   Lab 07/03/21 0618 06/30/21 0633 06/28/21  0905 06/27/21  0622   NA  --   --  138  --    POTASSIUM  --   --  3.6  --    CHLORIDE  --   --  105  --    TAY  --   --  7.8*  --    CO2  --   --  29  --    BUN  --   --  16  --    CR 0.62 0.62 0.62 0.73   GLC  --   --  99  --      CBC  Recent Labs   Lab 07/03/21 0618 06/30/21 0633 " 06/28/21  0905 06/27/21  0622   WBC  --   --  14.0*  --    RBC  --   --  2.95*  --    HGB  --   --  9.0*  --    HCT  --   --  26.9*  --    MCV  --   --  91  --    MCH  --   --  30.5  --    MCHC  --   --  33.5  --    RDW  --   --  14.8  --    * 687* 685* 605*     INRNo lab results found in last 7 days.  LFTsNo lab results found in last 7 days.   PANCNo lab results found in last 7 days.        Impression/Plan        86 year old female admitted on 6/25/2021 for evaluation of left intra trochanteric fracture. She had a fall. No report or syncope or LOC. She has a PMHx significant for HTN, Osteoporosis, Dementia and Dyslipidemia.    Now S/P left hip IM nailing on 6/26/21      # Left intratrochanteric fracture  # S/P IM nailing on 6/26  Management primarily per Ortho team.  - Wound cares, Dressings, Surgical pain management, DVT Prophylaxis  per primary team.   - Monitor anemia, hemodynamics, Input/Output, Capnography (for 24 hours)  - Encourage Incentive spirometry  - Laxatives for constipation prophylaxis     # Anemia: Most likely due to blood loss, and post surgical inflammation.   Hg level at 9 gm/dl on 06/28  - Transfuse if Hg < 7 gm/dl       # HTN   # Dyslipimdemia   Resume home dose of Accupril.   hydrochlorothiazide  resumed  7/1  -Continue on PTA Lipitor.      # Leucocytosis   Likely stress demargination. Improving.   No dysuria, and frequency. No lower abdominal pain. Urine culture growing E coli, likely bacteriuria.        # Dementia   # Worseining encephalopathy   Had bedside attendant placed, now removed.   Continue on PTA Aricept  Long discussion  with son and daughter with regards to course of dementia and encephalopathy in the immediate post operative period. They hoped to have a geriatrician involved, and Dr Krishna Viera kindly agreed to see patient and discuss with family. It I likely that in the best case scenario, patient should go to memory care NH with rehabilitation   Improving        # Acute  post operative urinary retention   Post operative. Requiring intermittent Straight cath   Discussed with patient and family in detail  - Check PVR, straight cath for PVR > 350 ml  - If > 3 straight cath in 24 hours period, then placed Lizarraga's catheter.         # Osteoporosis   -Continue on Vitamin D        # Diet:   Regular Diet Adult    # DVT Prophylaxis: Lovenox  # Lizarraga Catheter: Not present  # Central Lines: None  # Disposition: Awaiting placement, SW aware.          Pt's care was discussed with bedside RN, patient and  during Care Team Rounds.               Sai Villar MD  Hospitalist ( Internal medicine)  Pager: [phone]   }

## 2021-07-03 NOTE — PLAN OF CARE
"  VS: /48 (BP Location: Right arm)   Pulse 78   Temp 97.9  F (36.6  C) (Oral)   Resp 18   Ht 1.676 m (5' 6\")   Wt 72.6 kg (160 lb)   SpO2 97%   BMI 25.82 kg/m       O2: >90% on room air. Denies SOB/N/V/chest pain    Output: Voiding on BSC, patient still retaining urine had to be straight cath x1    Last BM: 7/2/21   Activity: TTWB. Ax1 with walker and gait belt    Skin: Incision    Pain: Managed with scheduled tylenol and robaxin    CMS: Intact    Dressing: CDI   Diet: Reguar diet, poor appetite. Patient family at bedside helping with meals    LDA: PIV saline locked    Equipment: BSC, walker,gait belt, call light within reach    Plan: TBD   Additional Info:        "

## 2021-07-03 NOTE — PROGRESS NOTES
Orthopaedic Surgery Progress Note 07/03/2021    Assessment: Debi Morton is a 86 year old female s/p L hip IM nail on 6/26/21 with Dr. Mckenzie. Doing well.    Goals: Awaiting TCU placement   Appreciate medicine team assistance with cares.     Plan:  Orthopedics Primary  Activity: Up with assist.  Weight bearing status: TTWB x6 weeks.  Antibiotics: complete  Diet: Begin with clear fluids and progress diet as tolerated.  DVT prophylaxis: lovenox in house, and 4 weeks aspirin 162mg QD and mechanical while in the hospital  Wound Care: dressing changed 7/1/21  Pain management: transition from IV to orals as tolerated.   X-rays:  Obtain intraoperatively  Physical Therapy:  ROM, ADL's.  Occupational Therapy: ADL's.  Labs: Trend Hgb on POD #1  Consults: podiatry for toe nail trimming, Medicine for comanagement  Follow-up: Clinic with Dr. Mckenzie in 2 weeks or if going to nursing home then with Lexa  Disposition: Pending progress with therapies, pain control on orals, and medical stability, anticipate discharge to TCU pending placement.       Yvette Moreno MD  Orthopaedic Surgery, PGY-4  [phone]       --------------------------------------------------------------------------------------------------------------------------------------------------------------------------------------------    S: Pain  controlled on meds. Appears comfortable. Pleasantly confused this am. Straight cathed x1 due to urinary retention. Plan to discharge to TCU pending placement.     O:  Temp: 96.7  F (35.9  C) Temp src: Oral BP: 122/61 Pulse: 75   Resp: 16 SpO2: 95 % O2 Device: None (Room air)      Exam:  Gen: No acute distress, resting comfortably in bed.  Resp: Non-labored breathing  MSK:  LLE:  - incision c/d/i.   - SILT throughout sural, saphenous, tibial, DP and SP nerve distributions   - Fires TA, EHL, FHL, GaSC  -  foot wwp.    Recent Labs   Lab 07/03/21  0618 06/30/21  0633 06/28/21  0905   WBC  --   --  14.0*   HGB  --    --  9.0*   * 687* 685*     No results found for: SED

## 2021-07-03 NOTE — PLAN OF CARE
"  VS: /56 (BP Location: Left arm)   Pulse 79   Temp 98.4  F (36.9  C) (Oral)   Resp 17   Ht 1.676 m (5' 6\")   Wt 72.6 kg (160 lb)   SpO2 98%   BMI 25.82 kg/m     O2: >90% on room air    Output: Voiding on BSC, patient continues to retain urine however not need to be straight cath this shift    Last BM: 7/1/21   Activity: TTWB.Ax1-2 with walker and gait belt    Skin: Incision    Pain: Managed with scheduled tylenol and robaxin    CMS: Denies numbness and tingling    Dressing: CDI   Diet: Regular diet    LDA: PIV saline locked    Equipment: BSC, walker,gait belt, call light within reach    Plan: Awaiting TCU placement    Additional Info:        "

## 2021-07-03 NOTE — PROGRESS NOTES
"SPIRITUAL HEALTH SERVICES Progress Note  07/03 Unit: [facility]     amari Navarro visited pt, Daughter and Son in her room. Pt's Daughter informed me \"I flew in from [place] after two years to be with her during this time\". Daughter also told me that \"I was quite sacred for my mother\". She informed me that they are just waiting for placement to be available for her mother.        Danette Jones   Intern  Pager [phone]    "

## 2021-07-03 NOTE — PROGRESS NOTES
CLINICAL NUTRITION SERVICES - ASSESSMENT NOTE     Nutrition Prescription    RECOMMENDATIONS FOR MDs/PROVIDERS TO ORDER:  None at this time    Malnutrition Status:    Unable to determine- insufficient information    Recommendations already ordered by Registered Dietitian (RD):  Medical food supplement therapy- trial Magic Cup once daily to promote intake    Future/Additional Recommendations:  Monitor intake, obtain weight if able, acceptance of supplements--adjust prn     REASON FOR ASSESSMENT  Debi Morton is a/an 86 year old female assessed by the dietitian for LOS. Pt admitted d/t left hip fracture s/p surgical repair. PMH significant for dyslipidemia, htn, dementia, osteoporosis    NUTRITION HISTORY  - Unable to obtain nutrition history - pt outside with family at time of attempted assessment  - pt currently resides in senior care, appears to receive help with all ADLs including cooking. Social work is seeking memory care unit at time of discharge based on current status.  - NKFA    CURRENT NUTRITION ORDERS  Diet: Regular, room service assist  Intake/Tolerance: variable, for example, poor breakfast but ate 75% of lunch per RN (soup and sandwich). Intake appears to range from 0-75%.    LABS  Labs reviewed    Electrolytes  Sodium (mmol/L)   Date Value   06/28/2021 138   06/25/2021 138     Potassium (mmol/L)   Date Value   06/28/2021 3.6   06/25/2021 3.3 (L)     No results found for: MAG, PHOS Renal  Urea Nitrogen (mg/dL)   Date Value   06/28/2021 16   06/25/2021 29     Creatinine (mg/dL)   Date Value   07/03/2021 0.62   06/30/2021 0.62   06/28/2021 0.62     Inflammatory Markers  WBC (10e9/L)   Date Value   06/28/2021 14.0 (H)   06/25/2021 15.9 (H)      Blood Glucose  Glucose (mg/dL)   Date Value   06/28/2021 99   06/25/2021 106 (H)    Hepatic  No results found for: ALT, AST, ALKPHOS, BILITOTAL Additional  Ketones Urine (mg/dL)   Date Value   06/30/2021 10 (A)          MEDICATIONS    acetaminophen  975 mg Oral TID  "    atorvastatin  10 mg Oral Daily     calcium carbonate  500 mg Oral Daily     cholecalciferol  50,000 Units Oral Weekly     cyanocobalamin  100 mcg Oral Daily     donepezil  10 mg Oral At Bedtime     enoxaparin ANTICOAGULANT  40 mg Subcutaneous Q24H     hydrochlorothiazide  25 mg Oral Every Other Day     lisinopril  40 mg Oral Daily     methocarbamol  500 mg Oral 4x Daily     polyethylene glycol  17 g Oral Daily        - MEDICATION INSTRUCTIONS -          ANTHROPOMETRICS  Height: 167.6 cm (5' 6\")  Most Recent Weight: 72.6 kg (160 lb)(estimated)    IBW: 59.1 kg  Body mass index is 25.82 kg/m .  BMI: Overweight BMI 25-29.9  Weight History:   Wt Readings from Last 10 Encounters:   06/26/21 72.6 kg (160 lb)     No additional weight records available per Care Everywhere and chart review    Dosing Weight: 62.5 kg, adjusted using \"estimated\" and ideal body weights    ASSESSED NUTRITION NEEDS  Estimated Energy Needs: 6673-1913 kcals/day (25 - 30 kcals/kg)  Justification: overweight  Estimated Protein Needs: 75+ grams protein/day (1.2+)  Justification: Post-op  Estimated Fluid Needs: 7827-7797 mL/day (1 mL/kcal)   Justification: Maintenance    PHYSICAL FINDINGS  Skin- red, blanchable coccyx, surgical incisions to hip    MALNUTRITION  % Intake: Unable to assess  % Weight Loss: Unable to assess  Subcutaneous Fat Loss: Unable to assess  Muscle Loss: Unable to assess  Fluid Accumulation/Edema: None noted  Malnutrition Diagnosis: Unable to determine     NUTRITION DIAGNOSIS  Inadequate nutrient intake related to confusion, recent surgery as evidenced by variable intake      INTERVENTIONS  Implementation  Nutrition Education: Unable to complete due to pt and family outside at time of attempted visit.  Medical food supplement therapy- trial Magic Cup once daily to promote intake    Goals  Patient to consume % of nutritionally adequate meal trays TID, or the equivalent with supplements/snacks.   "   Monitoring/Evaluation  Progress toward goals will be monitored and evaluated per protocol.  Renea Mcallister RD, LD  ICU/5A/OB/Mental Health Pager (M-F): [phone]  On Call Pager (weekends only): [phone]

## 2021-07-04 NOTE — PROGRESS NOTES
"[facility], [place]   Internal Medicine Daily Note           Interval History/Events     Overnight events reviewed  No nausea, vomiting, chest pain, shrotness of myrat  Having PO. PO intake improving this AM  No burning urination or loose stools.        Review of Systems        4 point ROS including Respiratory, CV, GI and , other than that noted above is negative      Medications   I have reviewed current medications  in the \"current medication\" section of Epic.  Relevant changes include:     Physical Exam   General:       Vital signs:    Blood pressure 130/55, pulse 74, temperature 97.2  F (36.2  C), temperature source Oral, resp. rate 16, height 1.676 m (5' 6\"), weight 72.6 kg (160 lb), SpO2 93 %.  Estimated body mass index is 25.82 kg/m  as calculated from the following:    Height as of this encounter: 1.676 m (5' 6\").    Weight as of this encounter: 72.6 kg (160 lb).      Intake/Output Summary (Last 24 hours) at 7/2/2021 1439  Last data filed at 7/2/2021 1300  Gross per 24 hour   Intake --   Output 200 ml   Net -200 ml        Constitutional: Laying in bed in no acute distress  Eye: No icterus, no pallor  Mouth/ENT: Normal oral mucosa  Cardiovascular:  S1, S2 normal.   Respiratory: B/L CTA  GI: Soft, NT, BS+  : No gutierrez's catheter  Neurology: Alert, awake, and oriented.   Psych: Mood stable.   MSK:   Integumentary:   Heme/Lymph/Imm:      Laboratory and Imaging Studies     I have reviewed  laboratory and imaging studies in the Epic. Pertinent findings are as below:    BMP  Recent Labs   Lab 07/03/21 0618 06/30/21 0633 06/28/21  0905   NA  --   --  138   POTASSIUM  --   --  3.6   CHLORIDE  --   --  105   TAY  --   --  7.8*   CO2  --   --  29   BUN  --   --  16   CR 0.62 0.62 0.62   GLC  --   --  99     CBC  Recent Labs   Lab 07/03/21 0618 06/30/21 0633 06/28/21  0905   WBC  --   --  14.0*   RBC  --   --  2.95*   HGB  --   --  9.0*   HCT  --   --  26.9*   MCV  --   --  91   MCH "  --   --  30.5   MCHC  --   --  33.5   RDW  --   --  14.8   * 687* 685*     INRNo lab results found in last 7 days.  LFTsNo lab results found in last 7 days.   PANCNo lab results found in last 7 days.        Impression/Plan        86 year old female admitted on 6/25/2021 for evaluation of left intra trochanteric fracture. She had a fall. No report or syncope or LOC. She has a PMHx significant for HTN, Osteoporosis, Dementia and Dyslipidemia.    Now S/P left hip IM nailing on 6/26/21      # Left intratrochanteric fracture  # S/P IM nailing on 6/26  Management primarily per Ortho team.  - Wound cares, Dressings, Surgical pain management, DVT Prophylaxis  per primary team.   - Monitor anemia, hemodynamics, Input/Output, Capnography (for 24 hours)  - Encourage Incentive spirometry  - Laxatives for constipation prophylaxis     # Anemia: Most likely due to blood loss, and post surgical inflammation.   Hg level at 9 gm/dl on 06/28  - Transfuse if Hg < 7 gm/dl       # HTN   # Dyslipimdemia   Resume home dose of Accupril.   hydrochlorothiazide  resumed  7/1  -Continue on PTA Lipitor.      # Leucocytosis   Likely stress demargination. Improving.   No dysuria, and frequency. No lower abdominal pain. Urine culture growing E coli, likely bacteriuria.        # Dementia   # Worseining encephalopathy   Had bedside attendant placed, now removed.   Continue on PTA Aricept  Long discussion  with son and daughter with regards to course of dementia and encephalopathy in the immediate post operative period. They hoped to have a geriatrician involved, and Dr Krishna Viera kindly agreed to see patient and discuss with family. It I likely that in the best case scenario, patient should go to memory care NH with rehabilitation   Improving        # Acute post operative urinary retention   Post operative. Requiring intermittent Straight cath   Discussed with patient and family in detail  - Check PVR, straight cath for PVR > 350 ml  - If > 3  straight cath in 24 hours period, then placed Lizarraga's catheter.         # Osteoporosis   -Continue on Vitamin D        # Diet:   Regular Diet Adult    # DVT Prophylaxis: Lovenox  # Lizarraga Catheter: Not present  # Central Lines: None  # Disposition: Awaiting placement, SW aware.          Pt's care was discussed with bedside RN, patient and  during Care Team Rounds.               Sai Villar MD  Hospitalist ( Internal medicine)  Pager: [phone]   }

## 2021-07-04 NOTE — PLAN OF CARE
"VS: /52 (BP Location: Right arm)   Pulse 65   Temp 97.7  F (36.5  C) (Oral)   Resp 16   Ht 1.676 m (5' 6\")   Wt 72.6 kg (160 lb)   SpO2 97%   BMI 25.82 kg/m       O2: 97% on room air. Pt denies SOB, chest pain   Output: Strains to void. Put on BSC X1, voided 20 ml. ,  St cathte ,275 ml.   Last BM: 7/2/21.  Bowel sound active x 4.    Activity: Assist x 1 with gait belt and walker. TTWB    Skin: Intact. Incision on left hip   Pain: denies pain    CMS /Neuro: Intact    Dressing: CDI    Diet: Regular diet    LDA: PIV left arm saline locked    Equipment: Walker, call light, IV pole/pump, BSC, gait belt and personal belongings     Plan: Waiting TCU  Placement    Additional Info:                "

## 2021-07-04 NOTE — PROGRESS NOTES
Orthopaedic Surgery Progress Note 07/04/2021    Assessment: Debi Morton is a 86 year old female s/p L hip IM nail on 6/26/21 with Dr. Mckenzie. Doing well.    Goals: Awaiting TCU placement   Appreciate medicine team assistance with cares.     Plan:  Orthopedics Primary  Activity: Up with assist.  Weight bearing status: TTWB x6 weeks.  Antibiotics: complete  Diet: Begin with clear fluids and progress diet as tolerated.  DVT prophylaxis: lovenox in house, and 4 weeks aspirin 162mg QD and mechanical while in the hospital  Wound Care: dressing changed 7/1/21  Pain management: transition from IV to orals as tolerated.   X-rays:  Obtain intraoperatively  Physical Therapy:  ROM, ADL's.  Occupational Therapy: ADL's.  Labs: Trend Hgb on POD #1  Consults: podiatry for toe nail trimming, Medicine for comanagement  Follow-up: Clinic with Dr. Mckenzie in 2 weeks or if going to nursing home then with Lexa  Disposition: Pending progress with therapies, pain control on orals, and medical stability, anticipate discharge to TCU pending placement.       Yvette Moreno MD  Orthopaedic Surgery, PGY-4  [phone]       --------------------------------------------------------------------------------------------------------------------------------------------------------------------------------------------    S: Pain  controlled on meds. Appears comfortable. Pleasantly confused this am. States that she is tired this AM. Continues to need intermittent straight cath for urinary retention. Last BM 7/1.  Plan to discharge to TCU pending placement.     O:  Temp: 97.3  F (36.3  C) Temp src: Oral BP: 117/52 Pulse: 66   Resp: 16 SpO2: 95 % O2 Device: None (Room air)      Exam:  Gen: No acute distress, resting comfortably in bed.  Resp: Non-labored breathing  MSK:  LLE:  - incision c/d/i.   - SILT throughout sural, saphenous, tibial, DP and SP nerve distributions   - Fires TA, EHL, FHL, GaSC  -  foot wwp.    Recent Labs   Lab  07/03/21  0618 06/30/21  0633 06/28/21  0905   WBC  --   --  14.0*   HGB  --   --  9.0*   * 687* 685*     No results found for: SED    Imaging:  XR left hip demonstrates interval placement of surgical hardware with improved alignment of known IT femur fracture

## 2021-07-04 NOTE — PLAN OF CARE
VS:     Pt A/O X 4. Afebrile. VSS. Lungs-clear bilaterally with both anterior and posterior. IS encouraged. Denies nausea, shortness of breath, and chest pain.     Output:     Bowels-active in all four quadrants. Last BM 7/4, pt had 3 BMs this shift, 2 small and 1 medium soft/formed. Pt strains to void on commode. Voided multiple times throughout this shift, had bladder scan this evening of 300 mL.       Activity:     Pt up to bedside commode and into chair/wheelchair with assist of 2, gait belt and walker.     Skin: Incisions to LLE.     Pain:     Has pain in the LLE and given scheduled Tylenol, scheduled Robaxin, and is tolerating well.      CMS:     CMS and Neuro's are intact. Denies numbness and tingling in all extremities.      Dressing:     LLE incisional dressings are clean, dry, and intact.      Diet:     Pt is on a regular diet and appetite was fair this shift.       LDA:     PIV is patent in the left arm and SL.      Equipment:     Pt declined PCDs. Bed alarm on.     Plan:     Pt is able to make needs known and the call light is within the pt's reach. Continue to monitor.       Additional Info:     Waiting on placement. Pt is on bed alarm.

## 2021-07-05 NOTE — PLAN OF CARE
"VS: /55 (BP Location: Right arm)   Pulse 64   Temp 97.1  F (36.2  C) (Oral)   Resp 16   Ht 1.676 m (5' 6\")   Wt 72.6 kg (160 lb)   SpO2 95%   BMI 25.82 kg/m      O2: 95% on room air. Pt denies SOB, chest pain   Output: Strains to void.  BSC X 2.    Last BM: 7/2/21.  Bowel sound active x 4.    Activity: Assist x 1 with gait belt and walker. TTWB.    Skin: Intact. Incision on left hip   Pain: Nita left hip pain. Prn oxycodone given    CMS /Neuro: Denies numbness or tingling. Alert and confused. Dementia forgetful    Dressing: CDI    Diet: Regular diet    LDA: PIV left arm saline locked    Equipment: Walker, call light, IV pole/pump, BSC, gait belt and personal belongings     Plan: Waiting TCU  Placement    Additional Info:         "

## 2021-07-05 NOTE — PLAN OF CARE
"VS: /52 (BP Location: Right arm)   Pulse 59   Temp 96.1  F (35.6  C) (Oral)   Resp 16   Ht 1.676 m (5' 6\")   Wt 72.6 kg (160 lb)   SpO2 94%   BMI 25.82 kg/m      O2: Sating >90% on RA. Bilateral lung sounds clear. Denies chest pain. Has SOB with exertion.    Output: Strains to void. Up to BCS.     Last BM: 5/4/21. Bowel sounds active x4. Passing flatus.    Activity: TTWB. Assist of 1 with FWW and gait belt. Up to BSC.     Up for meals? No.    Skin: Incision on LLE. Bruising to Right AC.    Pain: Pain is managed with scheduled tylenol.    CMS: Pt has dementia. Pt was alert and oriented to person, time, and situation, but disoriented to place. Denies numbness and tingling.    Dressing: Dressing to LLE is C/D/I.    Diet: Regular. Appetite is fair. Denies N/V.    LDA: No PIV in place.    Equipment: IV pole, FWW, gait belt, and personal belongings.    Plan: Continue to monitor. Call light within reach and utilized appropriately. Discharge to TCU pending placement.    Additional Info: Pt son took her outside for fresh air and sunlight.        "

## 2021-07-05 NOTE — PROGRESS NOTES
"[facility], [place]   Internal Medicine Daily Note           Interval History/Events     Overnight events reviewed  Reports doing well  No nausea, vomiting, chest pain, shortness of breath, lightheadedness or dizziness  No fever, chills.   Having BM  No issues with urination       Review of Systems        4 point ROS including Respiratory, CV, GI and , other than that noted above is negative      Medications   I have reviewed current medications  in the \"current medication\" section of Epic.  Relevant changes include:     Physical Exam   General:       Vital signs:    Blood pressure 129/52, pulse 59, temperature 96.1  F (35.6  C), temperature source Oral, resp. rate 16, height 1.676 m (5' 6\"), weight 72.6 kg (160 lb), SpO2 94 %.  Estimated body mass index is 25.82 kg/m  as calculated from the following:    Height as of this encounter: 1.676 m (5' 6\").    Weight as of this encounter: 72.6 kg (160 lb).      Intake/Output Summary (Last 24 hours) at 7/2/2021 1439  Last data filed at 7/2/2021 1300  Gross per 24 hour   Intake --   Output 200 ml   Net -200 ml        Constitutional: Laying in bed in no acute distress  Eye: No icterus, no pallor  Mouth/ENT: Normal oral mucosa  Cardiovascular:  S1, S2 normal.   Respiratory: B/L CTA  GI: Soft, NT, BS+  : No gutierrez's catheter  Neurology: Alert, awake, and oriented.   Psych: Mood stable.   MSK:   Integumentary:   Heme/Lymph/Imm:      Laboratory and Imaging Studies     I have reviewed  laboratory and imaging studies in the Epic. Pertinent findings are as below:    BMP  Recent Labs   Lab 07/03/21  0618 06/30/21  0633   CR 0.62 0.62     CBC  Recent Labs   Lab 07/03/21  0618 06/30/21  0633   * 687*     INRNo lab results found in last 7 days.  LFTsNo lab results found in last 7 days.   PANCNo lab results found in last 7 days.        Impression/Plan        86 year old female admitted on 6/25/2021 for evaluation of left intra trochanteric fracture. " She had a fall. No report or syncope or LOC. She has a PMHx significant for HTN, Osteoporosis, Dementia and Dyslipidemia.    Now S/P left hip IM nailing on 6/26/21      # Left intratrochanteric fracture  # S/P IM nailing on 6/26  Management primarily per Ortho team.  - Wound cares, Dressings, Surgical pain management, DVT Prophylaxis  per primary team.   - Monitor anemia, hemodynamics, Input/Output, Capnography (for 24 hours)  - Encourage Incentive spirometry  - Laxatives for constipation prophylaxis     # Anemia: Most likely due to blood loss, and post surgical inflammation.   Hg level at 9 gm/dl on 06/28  - Transfuse if Hg < 7 gm/dl       # HTN   # Dyslipimdemia   Resume home dose of Accupril.   hydrochlorothiazide  resumed  7/1  -Continue on PTA Lipitor.      # Leucocytosis   Likely stress demargination. Improving.   No dysuria, and frequency. No lower abdominal pain. Urine culture growing E coli, likely bacteriuria.        # Dementia   # Worseining encephalopathy   Had bedside attendant placed, now removed.   Continue on PTA Aricept  Long discussion  with son and daughter with regards to course of dementia and encephalopathy in the immediate post operative period. They hoped to have a geriatrician involved, and Dr Krishna Viera kindly agreed to see patient and discuss with family. It I likely that in the best case scenario, patient should go to memory care NH with rehabilitation   Improving        # Acute post operative urinary retention   Post operative. Requiring intermittent Straight cath   Discussed with patient and family in detail  - Check PVR, straight cath for PVR > 350 ml  - If > 3 straight cath in 24 hours period, then placed Lizarraga's catheter.         # Osteoporosis   -Continue on Vitamin D        # Diet:   Regular Diet Adult    # DVT Prophylaxis: Lovenox  # Lizarraga Catheter: Not present  # Central Lines: None  # Disposition: Awaiting placement, SW aware. Okay to discharge from medicine standpoint           Pt's care was discussed with bedside RN, patient and  during Care Team Rounds.               Sai Villar MD  Hospitalist ( Internal medicine)  Pager: [phone]   }

## 2021-07-05 NOTE — PROGRESS NOTES
Orthopaedic Surgery Progress Note 07/04/2021    Assessment: Debi Morton is a 86 year old female s/p L hip IM nail on 6/26/21 with Dr. Mckenzie. Doing well.    Goals: Awaiting TCU placement   Appreciate medicine team assistance with cares.     Complaining b/l knee pain this am. Will obtain dedicated XR to r/o out any pathology.    Plan:  Orthopedics Primary  Activity: Up with assist.  Weight bearing status: TTWB x6 weeks.  Antibiotics: complete  Diet: Begin with clear fluids and progress diet as tolerated.  DVT prophylaxis: lovenox in house, and 4 weeks aspirin 162mg QD and mechanical while in the hospital  Wound Care: dressing changed 7/1/21  Pain management: transition from IV to orals as tolerated.   X-rays:  Obtain intraoperatively  Physical Therapy:  ROM, ADL's.  Occupational Therapy: ADL's.  Labs: Trend Hgb on POD #1  Consults: podiatry for toe nail trimming, Medicine for comanagement  Follow-up: Clinic with Dr. Mckenzie in 2 weeks or if going to nursing home then with Lexa  Disposition: Pending progress with therapies, pain control on orals, and medical stability, anticipate discharge to TCU pending placement.     Bud Branch MD  Orthopaedic Surgery Resident, PGY4  Pager: [phone]       --------------------------------------------------------------------------------------------------------------------------------------------------------------------------------------------    S: Pain  controlled on meds. Appears comfortable. Pleasantly confused this am. Voiding sponatenously, required intermittent straight cath. Last BM 7/4.  Plan to discharge to TCU pending placement. No acute events, reports b/l knee pain.    O:  Temp: 97.7  F (36.5  C) Temp src: Oral BP: 113/56 Pulse: 83   Resp: 16 SpO2: 96 % O2 Device: None (Room air)      Exam:  Gen: No acute distress, resting comfortably in bed.  Resp: Non-labored breathing  MSK:    LLE:  - incision c/d/i.   - SILT throughout sural, saphenous, tibial, DP and  SP nerve distributions   - Fires TA, EHL, FHL, GaSC  -  foot [facility].  - able to range knee actively    RLE:  No obvious knee effusion onted.  Able to range knee actively.  Overall weakness present  ankle DF/PF intact  EHL,FHL intact  SILT grossly at the foot  Foot [facility]        Recent Labs   Lab 07/03/21  0618 06/30/21  0633 06/28/21  0905   WBC  --   --  14.0*   HGB  --   --  9.0*   * 687* 685*     No results found for: SED

## 2021-07-05 NOTE — PLAN OF CARE
VS: Temp: 96.8  F (36  C) Temp src: Oral BP: 139/59 Pulse: 64   Resp: 16 SpO2: 96 % O2 Device: None (Room air)     O2: Patient is on room air, breathing without difficulty. Denies SOB and chest pain.   Output: Patient was continent of bowel and bladder this shift. Used toilet, but bedside commode in room.   Patient bladder scanned at 1900, PRV of 150; no need to straight cath at this time.   Last BM: 5/4/21   Activity: One assist with gait belt and walker; pivots into wheelchair. This shift she pivoted onto the toilet.    Skin: Bruising on arm   Pain: Patient stated she was in pain on her left side flank, hip, and knee.   CMS: Denies N/T in hands and feet.   Patient has dementia, but no behaviors linked, simply forgetful and a bit anxious. Patient forgot who I was throughout the shift, but showed no agitation when reoriented.    Dressing: Dressings on her knee   Diet: Regular diet; set up assistance.    LDA: No lines, drains, or PIV access.    Equipment: Gait belt, walker, and wheel chair.    Plan: Looking for TCU placement. Continue to control pain and monitor urinary output.    Additional Info: Patient appears anxious regarding transfers and when there is too much stimulation; I was able to give her her meds when she had found a space of calmness.   Takes pills whole one at a time.   Son and daughter-in-law were here for the duration of my shift; they were helpful during dinner time with feeding assistance.

## 2021-07-05 NOTE — PROGRESS NOTES
Care Management Follow Up    Length of Stay (days): 10    Expected Discharge Date: TBD     Concerns to be Addressed: cognitive/perceptual, discharge planning     Patient plan of care discussed at interdisciplinary rounds: Yes    Anticipated Discharge Disposition: Transitional Care     Anticipated Discharge Services: None  Anticipated Discharge DME: None    Patient/family educated on Medicare website which has current facility and service quality ratings: yes  Education Provided on the Discharge Plan:  yes  Patient/Family in Agreement with the Plan: yes    Referrals Placed by CM/SW: Post Acute Facilities  Private pay costs discussed: transportation costs    Additional Information:  ROB met with pt's daughter Marychuy - they reviewed the [facility] list of TCUs and selected additional preferences for referrals. Marychuy ([email or web address]) emailed SW the list and SW made new TCU referrals via [employer].     ADDEND 1602: ROB met with the pt's son Perfecto at bedside and providing him with copies of the [place] [facility] HCD, per his request. ROB also provided a list of the TCU updates below.  Perfecto is really hoping for [facility] because he lives 1/2 mile away.  ROB agreed to follow up with them, along with the rest of the pending referrals.     PENDING REFERRALS  [facility] (Top choice)  [address] [place] [place] [address]  Phone: [phone]  Fax: [phone]  7/5: Pt was declined due to bed availability last week, the family requested that SW try again for admission since the pt is still hospitalized.   ADDEND 153: SW rec'd VM from Debi in admissions, relaying that they may have a bed for tomorrow, but that they need to continue reviewing the referral and will reach out to  tomorrow (7/6/21).     [facility]  [address]  [facility] [address]  Phone: [phone]  Fax: [phone]  7/5: ROB rec'd VM from Shannon in admissions, requesting a call back to discuss  referral. SW left a VM back for Shannon, requesting a call to discuss.     [facility]  [address] [place] [address]  Phone: [phone]  Fax: [phone]  7/5: SW left a VM for Eleni Toscano in admissions, requesting a call back to discuss referral.      [facility]  [address] [facility] [id number] [place] [address]  Phone: [phone]  Fax: [phone]  7/2: SW left a VM for Nikki in admissions requesting a call back to discuss referral. SW requested that if the facility can take the pt over the weekend, they call the unit and ask to speak with the on call SW. SW resent SNF referral as Nikki's VM indicated an updated fax number (noted above).     [facility]  [address] [address]  Phone: [phone]  Fax: [phone]    [facility]  [address] [facility] [address]  Phone: [phone]  Fax: [phone]     DECLINED  [facility]  [address] [facility] [address]  Phone: [phone]  Fax: [phone]  7/5: SW rec'd VM from Alma in admissions, stating that they do not have an appropriate bed for the patient so they are declining. No specific reason provided.     [facility]   [address] [address]  Phone: [phone]  Fax: [phone]  7/5: SW rec'd a VM from Loy in admissions, they do not have any beds until the end of the week, so they are declining due to availability.     [facility]  [address] [facility] [address]  Phone: [phone]  Fax: [phone]  7/5: SW rec'd VM from admissions - they are declining because the pt does not meet their medical criteria (TBI, stroke, spinal surgery)    [facility]  [address] [place] [address]  Phone: [phone]  Fax: [phone]  7/2: ROB left a VM with Nikki in admissions (ph: [phone]) requesting a call back to discuss referral. Nikki called back to relay they are out of network  for [facility], so they cannot accept.     [facility]  [address] [facility] [address]  Phone: [phone]  Fax: [phone]  7/2: SW spoke with Maddy in admissions - they are NOT in network with [facility], so they cannot accept.      [facility]   [address]   [place] [address]  Phone: [phone], option 3  Fax: [phone]  Note: This facility is not the family's first choice, they are open to it because it has a memory care component if the other facilities deny.   7/2: SW spoke with Tory in admissions, they are out of network for [facility], so they cannot accept.      [facility]  [address]  [place] [address]  Phone: [phone]  Fax: [phone]  7/2: SW left a VM with Nikki in admissions (ph: [phone]) requesting a call back to discuss referral. Nikki called back to relay they are out of network for [facility], so they cannot accept.        KENN Llamas  [facility]  5 Ortho [address] 8A   Ph: [phone]  Pager: [phone]

## 2021-07-06 NOTE — PROGRESS NOTES
Care Management Follow Up    Length of Stay (days): 11    Expected Discharge Date: TBD     Concerns to be Addressed: cognitive/perceptual, discharge planning     Patient plan of care discussed at interdisciplinary rounds: Yes    Anticipated Discharge Disposition: Transitional Care     Anticipated Discharge Services: None  Anticipated Discharge DME: None    Patient/family educated on Medicare website which has current facility and service quality ratings: yes  Education Provided on the Discharge Plan: yes   Patient/Family in Agreement with the Plan: yes    Referrals Placed by CM/SW: Post Acute Facilities  Private pay costs discussed: Not applicable    Additional Information:  ROB rec'd email from the pt's daughter Marisol ([email or web address]) - she relayed that they are concerned about the pt's TCU placement and current care, stating that PT did not see the patient at all yesterday. Marisol also expressed concern that she is still not able to access the patient's Kids Calendar, as there is an issue with a merge internally with Epic.  Marisol also asked SW to confirm with TCU that they are in network for [facility].     ROB emailed nursing manager Kary Moseley and messaged PT supervisor Ishmael ARAGON to inform them of the family's concerns.     ROB emailed updates to Marisol, Marychuy and Perfecto (the pt's children). SW validated their concerns and relayed that she has passed those concerns on to Radha. ORB provided the phone number for Kids Calendar help desk (ph: [phone]) and relayed that ROB also made nursing manager Lorelei aware of the issue.     ROB also provided an update on TCUs - the most likely option at this point seems to be [facility], which was the pt's son Perfecto's first choice because he lives 7 blocks away. ROB asked the family to confirm that this is still their top choice, as only one TCU at a time can submit for prior authorization.     ROB left a VM for the pt's daughter Marisol (ph: [phone]) and requested a call back to discuss.      ROB rec'd an email from Griselda - they finished the [place] Hon[employer] [place] form and would like to get it notarized, SW will reach out to see if notaries are available.  SW updated the family - still waiting to hear from [facility].     PENDING REFERRALS  [facility]  [address] [place] [address]  Phone: [phone]  Fax: [phone]  7/6: SW rec'd VM from Eleni Toscano in admissions, she relayed that she did NOT receive the referral that SW faxed yesterday and suggested an alternative fax number (Fax: [phone]) - the number above is correct, but she provided an alternate.  SW faxed the referral to the alternate phone number.  ROB called back and spoke with Eleni Toscano - she confirmed that they are in-network with [facility] - she will take a look at the referral and update SW right away if they plan to submit for authorization.   ADDEND 5043: ROB spoke with Eleni Toscano in admissions who relayed that they are having issues with their fax machine - she gave SW a third fax number ([phone]) to try and SW sent the fax via Epic.  SW asked if Eleni Toscano had an email address and SW would email a PDF - Eleni Toscano stated that she did not.  She is going to check for the fax and let SW know right away if they get it.   ADDEND 1600: SW rec'd a message from Eleni Toscano who said they only got a part of the referral.  SW faxed it again to the original fax number (fax: [phone]) and sent PT notes - SW faxed it via RightFax to try something other than Epic.      [facility]   [address] [place] [place] [address]  Phone: [phone]  Fax: [phone]  7/5: Pt was declined due to bed availability last week, the family requested that SW try again for admission since the pt is still hospitalized.   ADDEND 1534: ROB rec'd VM from Debi in admissions, relaying that they may have a bed for tomorrow, but that they need to continue reviewing the referral and will reach out to SW  tomorrow (7/6/21).      [facility]  [address]  [facility] [address]  Phone: [phone]  Admissions: [phone]  Fax: [phone]  7/6: SW left a VM for admissions, requesting a call back to discuss referral, SW asked if they are in network for [facility].      DECLINED  [facility]  [address] [facility] [address]  [place] [address]  Phone: [phone]  Fax: [phone]  7/6: SW rec'd a VM from Nikki in admissions, declining for admission because they are out of network for [facility].     [facility]  [address]  [facility] [address]  Phone: [phone]  Fax: [phone]  7/6: SW rec'd a VM from Eleni in admissions - they are out of network for [facility], so they will decline.     [facility]  [address] [facility] [address]  Phone: [phone]  Fax: [phone]  7/6: SW spoke with admissions staff - the facility is NOT in network for [facility], so they cannot accept.     [facility]  [address] [facility] [address]  Phone: [phone]  Fax: [phone]  7/5: SW rec'd VM from Alma in admissions, stating that they do not have an appropriate bed for the patient so they are declining. No specific reason provided.      [facility]   [address] [address]  Phone: [phone]  Fax: [phone]  7/5: SW rec'd a VM from Loy in admissions, they do not have any beds until the end of the week, so they are declining due to availability.      [facility]  [address] [facility] [address]  Phone: [phone]  Fax: [phone]  7/5: SW rec'd VM from admissions - they are declining because the pt does not meet their medical criteria (TBI, stroke, spinal surgery)     [facility]  [address] [place] [address]  Phone: [phone]  Fax: [phone]  7/2: ROB left a VM with Nikki in admissions (ph: [phone]) requesting a call back to discuss referral. Nikki called back to relay they are out  of network for [facility], so they cannot accept.      [facility]  [address] [facility] [address]  Phone: [phone]  Fax: [phone]  7/2: SW spoke with Maddy in admissions - they are NOT in network with [facility], so they cannot accept.      [facility]   [address] [facility] [place] [address]  Phone: [phone], option 3  Fax: [phone]  Note: This facility is not the family's first choice, they are open to it because it has a memory care component if the other facilities deny.   7/2: SW spoke with Tory in admissions, they are out of network for [facility], so they cannot accept.      [facility]  [address]  [place] [address]  Phone: [phone]  Fax: [phone]  7/2: SW left a VM with Nikki in admissions (ph: [phone]) requesting a call back to discuss referral. Nikki called back to relay they are out of network for [facility], so they cannot accept.       Rafaela Briseno ROB  [facility]  5 Ortho & 8A   Ph: [phone]  Pager: [phone]

## 2021-07-06 NOTE — PLAN OF CARE
"  VS: /53 (BP Location: Right arm)   Pulse 72   Temp 98.4  F (36.9  C) (Oral)   Resp 16   Ht 1.676 m (5' 6\")   Wt 72.6 kg (160 lb)   SpO2 93%   BMI 25.82 kg/m    Pt denied chest pain    O2: >90% on room air. Pt denies SOB.    Output: Getting up to void at bedside commode. Adequate output with PVR's of 257 and 207 during shift.    Last BM: 7/6/21 - small, loose brown stool in bedside commode.    Activity: Assist x 1 with walker and gait belt   Up for meals? N/A   Skin: L hip incision  Bruising to Right AC   Pain: Managed with PRN tylenol and repositioning.    CMS and Neuro's: Intact. Pt denies nubmness or tingling. Pt disoriented to time during shift. Pt has baseline dementia.    Dressing: CDI   Diet: Regular. Pt denies N/V   LDA: No IV access   Equipment: IV pump, pole, PCD's, walker, gait belt, and personal belongings.    Plan: TBD - looking for TCU placement.    Additional Info:        "

## 2021-07-06 NOTE — PROGRESS NOTES
Orthopaedic Surgery Progress Note 07/06/2021    Assessment: Debi Morton is a 86 year old female s/p L hip IM nail on 6/26/21 with Dr. Mckenzie. Doing well.    Goals: Awaiting TCU placement   Appreciate medicine team assistance with cares.     Plan:  Orthopedics Primary  Activity: Up with assist.  Weight bearing status: TTWB x6 weeks.  Antibiotics: complete  Diet: Begin with clear fluids and progress diet as tolerated.  DVT prophylaxis: lovenox in house, and 4 weeks aspirin 162mg QD and mechanical while in the hospital  Wound Care: dressing changed 7/1/21  Pain management: transition from IV to orals as tolerated.   X-rays:  Obtain intraoperatively  Physical Therapy:  ROM, ADL's.  Occupational Therapy: ADL's.  Labs: Trend Hgb on POD #1  Consults: podiatry for toe nail trimming, Medicine for comanagement  Follow-up: Clinic with Dr. Mckenzie in 2 weeks or if going to nursing home then with Lexa  Disposition: Pending progress with therapies, pain control on orals, and medical stability, anticipate discharge to TCU pending placement.     Bud Branch MD  Orthopaedic Surgery Resident, PGY4  Pager: [phone]       --------------------------------------------------------------------------------------------------------------------------------------------------------------------------------------------    S: Pain controlled on meds. Appears comfortable. Pleasantly confused this am. Voiding sponatenously, Last BM 7/4.  Plan to discharge to TCU pending placement.     O:  Temp: 97.4  F (36.3  C) Temp src: Oral BP: 138/50 Pulse: 68   Resp: 16 SpO2: 98 % O2 Device: None (Room air)      Exam:  Gen: No acute distress, resting comfortably in bed.  Resp: Non-labored breathing  MSK:    LLE:  - dressings c/d/i.   - SILT throughout sural, saphenous, tibial, DP and SP nerve distributions   - Fires TA, EHL, FHL, GaSC  -  foot wwp.  - able to range knee actively          Recent Labs   Lab 07/03/21  0618 06/30/21  0633   *  687*     No results found for: SED    B/l knee XR - no acute fx noted, degenerative changes present consistent with arthritis.

## 2021-07-06 NOTE — PLAN OF CARE
"VS: /47 (BP Location: Right arm)   Pulse 65   Temp 98  F (36.7  C) (Oral)   Resp 16   Ht 1.676 m (5' 6\")   Wt 72.6 kg (160 lb)   SpO2 97%   BMI 25.82 kg/m      O2: Sating >95% on RA. Bilateral lung sounds clear. Denies chest pain. Has SOB with exertion.    Output: Strains to void. Up to BCS.     Last BM: 5/6/21. Bowel sounds active x4. Passing flatus.    Activity: PT changed pt transfer status of TTWB to sliding board and assist of 1 with gait belt. Up to BSC. Pt is sitting in wheelchair.     Up for meals? No.    Skin: Incision on LLE. Bruising to Right AC.    Pain: Pain is managed with scheduled tylenol.    CMS: Pt has dementia. Pt was alert and oriented to person, place, and situation, but disoriented to place. Denies numbness and tingling.    Dressing: Dressing to LLE incision is C/D/I.    Diet: Regular. Appetite is fair. Denies N/V.    LDA: No PIV in place.    Equipment: IV pole, FWW, gait belt, and personal belongings.    Plan: Continue to monitor. Call light within reach and utilized appropriately. Discharge to TCU pending placement.    Additional Info: Family visiting in room.        "

## 2021-07-06 NOTE — PLAN OF CARE
"  VS: /53 (BP Location: Left arm)   Pulse 74   Temp 98  F (36.7  C) (Oral)   Resp 16   Ht 1.676 m (5' 6\")   Wt 72.6 kg (160 lb)   SpO2 95%   BMI 25.82 kg/m    Denies SOB and chest pain    O2: 95% at room air   Output: Uses bedside commode. Strains to void    Last BM: 7/06/21   Activity: Assist of 1, gait belt and walker, uses sliding board to transfer   Up for meals Upright in chair    Skin: Incision to L hip and L lateral knee   Pain: Has scheduled tylenol   CMS: Disoriented to place. Denies numbness and tingling    Dressing: CDI to LLE incision   Diet: Regular    LDA: No IV access   Equipment: Bedside commode, walker, gait belt, sliding board, call light    Plan: Possible discharge to TCU, pending placement, SW made referrals    Additional Info:        "

## 2021-07-07 NOTE — PROGRESS NOTES
"[facility] [place]   Internal Medicine Daily Note           Interval History/Events     Overnight events reviewed  Reports doing well  No nausea, vomiting chest pain, shortness of breath  No lightheadedness or dizziness.        Review of Systems        4 point ROS including Respiratory, CV, GI and , other than that noted above is negative      Medications   I have reviewed current medications  in the \"current medication\" section of Epic.  Relevant changes include:     Physical Exam   General:       Vital signs:    Blood pressure 126/53, pulse 67, temperature 97.9  F (36.6  C), temperature source Oral, resp. rate 12, height 1.676 m (5' 6\"), weight 72.6 kg (160 lb), SpO2 95 %.  Estimated body mass index is 25.82 kg/m  as calculated from the following:    Height as of this encounter: 1.676 m (5' 6\").    Weight as of this encounter: 72.6 kg (160 lb).      Intake/Output Summary (Last 24 hours) at 7/2/2021 1439  Last data filed at 7/2/2021 1300  Gross per 24 hour   Intake --   Output 200 ml   Net -200 ml        Constitutional: Laying in bed in no acute distress  Eye: No icterus, no pallor  Mouth/ENT: Normal oral mucosa  Cardiovascular:  S1, S2 normal.   Respiratory: B/L CTA  GI: Soft, NT, BS+  : No gutierrez's catheter  Neurology: Alert, awake, and oriented.   Psych: Mood stable.   MSK:   Integumentary:   Heme/Lymph/Imm:      Laboratory and Imaging Studies     I have reviewed  laboratory and imaging studies in the Epic. Pertinent findings are as below:    BMP  Recent Labs   Lab 07/06/21  0825 07/03/21  0618   CR 0.63 0.62     CBC  Recent Labs   Lab 07/06/21  0825 07/03/21  0618   * 687*     INRNo lab results found in last 7 days.  LFTsNo lab results found in last 7 days.   PANCNo lab results found in last 7 days.        Impression/Plan        86 year old female admitted on 6/25/2021 for evaluation of left intra trochanteric fracture. She had a fall. No report or syncope or LOC. She has " a PMHx significant for HTN, Osteoporosis, Dementia and Dyslipidemia.    Now S/P left hip IM nailing on 6/26/21      # Left intratrochanteric fracture  # s/p  IM nailing on 6/26  Management primarily per Ortho team.  - Wound cares, Dressings, Surgical pain management, DVT Prophylaxis  per primary team.   - Monitor anemia, hemodynamics, Input/Output, Capnography (for 24 hours)  - Encourage Incentive spirometry  - Laxatives for constipation prophylaxis     # Anemia: Most likely due to blood loss, and post surgical inflammation.   Hg level at 9 gm/dl on 06/28  - Transfuse if Hg < 7 gm/dl       # HTN   # Dyslipimdemia   Resume home dose of Accupril.   Hydrochlorothiazide  resumed  7/1  -Continue on PTA Lipitor.      # Leucocytosis   Likely stress demargination. Improving.   No dysuria, and frequency. No lower abdominal pain. Urine culture growing E coli, likely bacteriuria.        # Dementia   # Worseining encephalopathy   Had bedside attendant placed, now removed.   Continue on PTA Aricept  Long discussion  with son and daughter with regards to course of dementia and encephalopathy in the immediate post operative period. They hoped to have a geriatrician involved, and Dr Krishna Viera kindly agreed to see patient and discuss with family. It I likely that in the best case scenario, patient should go to memory care NH with rehabilitation   Improving        # Acute post operative urinary retention   Post operative. Requiring intermittent Straight cath   Discussed with patient and family in detail  - Check PVR, straight cath for PVR > 350 ml  - If > 3 straight cath in 24 hours period, then placed Lizarraga's catheter.         # Osteoporosis   -Continue on Vitamin D        # Diet:   Regular Diet Adult    # DVT Prophylaxis: Lovenox  # Lizarraga Catheter: Not present  # Central Lines: None  # Disposition: Awaiting placement, SW aware. Okay to discharge from medicine standpoint          Pt's care was discussed with bedside RN, patient and   during Care Team Rounds.               Sai Villar MD  Hospitalist ( Internal medicine)  Pager: [phone]   }

## 2021-07-07 NOTE — PLAN OF CARE
"  VS: VSS.   O2: Room air >90%.   Output: Voiding frequently in small amounts to bedside commode. Intermittent straight cath if PVR >350. PVR @ 0430 49.    Last BM: 7/7-small and loose X2.   Activity: Up with SBA/Ax1 and walker with GB.   Skin: Intact-incision.   Pain: States her legs really hurts after getting back to bed. Oxy 2.5 mg Q4H and given X1.   CMS: States \"I don't know I don't think so\".   Dressing: CDI.   Diet: Regular.   LDA: None.   Equipment: Personal belongings, walker, commode, call light.   Plan: TCU referrals sent.   Additional Info: Pt disoriented to situation-continuously states \"I don't know\" when asked questions or asked to clarify what she is saying. Mentions her leg hurting due to the \"broken bone\". Overall unaware of situation.      "

## 2021-07-07 NOTE — PROGRESS NOTES
Care Management Follow Up    Length of Stay (days): 12    Expected Discharge Date: 07/08/21 @ 1:15 pm     Concerns to be Addressed: discharge planning     Patient plan of care discussed at interdisciplinary rounds: Yes    Anticipated Discharge Disposition: Transitional Care     Anticipated Discharge Services: None  Anticipated Discharge DME: None    Patient/family educated on Medicare website which has current facility and service quality ratings: yes  Education Provided on the Discharge Plan:  yes  Patient/Family in Agreement with the Plan: yes    Referrals Placed by CM/SW: Post Acute Facilities  Private pay costs discussed: Not applicable    Additional Information:  ROB followed up on TCU referrals.  The pt has been accepted by [facility] TCU PENDING insurance authorization (see below).      ROB met with the pt and her daughter Marychuy at bedside.  ROB assisted in completing a health care directive (Yandy EASLEY) and having it notarized via  Jimbo.  Jimbo sent the notarized form to Yandy EASLEY via email.  ROB emailed a digital copy to the pt's children, Marisol, Marychuy and Perfecto.  ROB provided the pt with a physical copy at bedside.     ROB updated dtr Marychuy at bedside regarding the progress with TCU placement - we are waiting on insurance authorization.     ADDEND 1330 - Pt was accepted by [facility] for admission, the [facility] auth was approved.  ROB coordinated at length with the family and the TCU - the family was thinking they would want to transport, but then decided not to because they felt they would need to people in the vehicle and one of the daughters had to leave.      ROB set up a [facility] (ph: [phone]) w/c ride for tomorrow 7/8 @ 1:15 pm.  ROB informed the pt and her daughters.     ADDEND 1403: ROB spoke with nursing staff at [facility] and confirmed that pt is discharging tomorrow at 1:15 pm - they need proof of the pt's COVID-19  vaccination status - SW relayed because there is an issue with the pt's Epic chart, SW is not able to access the [facility], but that the pt's daughters have relayed that they can provide proof. ROB spoke with Marisol and she relayed [facility] (the pt's [facility]) would have the information - ROB called [facility] (ph: [phone]) and asked staff to fax verification of the pt's vaccination status to [facility] directly.  Staff agreed to do so this afternoon.     ADDEND 1627: SW completed preadmission screening, [id number].     ACCEPTED  [facility]  [address] [place] [address]  Phone: [phone]  Fax: [phone]        KENN Llamas  [facility]  5 Ortho & 8A   Ph: [phone]  Pager: [phone]

## 2021-07-07 NOTE — PROGRESS NOTES
Orthopaedic Surgery Progress Note 07/07/2021    Assessment: Debi Morton is a 86 year old female s/p L hip IM nail on 6/26/21 with Dr. Mckenzie. Doing well.    Goals: Awaiting TCU placement   Appreciate medicine team assistance with cares.     Plan:  Orthopedics Primary  Activity: Up with assist.  Weight bearing status: TTWB x6 weeks.  Antibiotics: complete  Diet: Begin with clear fluids and progress diet as tolerated.  DVT prophylaxis: lovenox in house, and 4 weeks aspirin 162mg QD and mechanical while in the hospital  Wound Care: dressing changed 7/1/21  Pain management: transition from IV to orals as tolerated.   X-rays:  Obtain intraoperatively  Physical Therapy:  ROM, ADL's.  Occupational Therapy: ADL's.  Labs: Trend Hgb on POD #1  Consults: podiatry for toe nail trimming, Medicine for comanagement  Follow-up: Clinic with Dr. Mckenzie in 2 weeks or if going to nursing home then with [facility]  Disposition: Pending progress with therapies, pain control on orals, and medical stability, anticipate discharge to TCU pending placement.     Bud Branch MD  Orthopaedic Surgery Resident, PGY4  Pager: [phone]       --------------------------------------------------------------------------------------------------------------------------------------------------------------------------------------------    S: Pain controlled on meds. Appears comfortable. Pleasantly confused this am. Voiding sponatenously, has not required straight  cath, Last BM 7/7.  Plan to discharge to TCU pending placement.     O:  Temp: 97.7  F (36.5  C) Temp src: Oral BP: 121/52 Pulse: 65   Resp: 12 SpO2: 98 % O2 Device: Nasal cannula      Exam:  Gen: No acute distress, resting comfortably in bed.  Resp: Non-labored breathing  MSK:    LLE:  - dressings c/d/i.   - SILT throughout sural, saphenous, tibial, DP and SP nerve distributions   - Fires TA, EHL, FHL, GaSC  -  foot wwp.  - able to range knee actively          Recent Labs   Lab  07/06/21  0825 07/03/21  0618   * 687*     No results found for: SED

## 2021-07-07 NOTE — PROGRESS NOTES
"VS: /53 (BP Location: Right arm)   Pulse 67   Temp 97.9  F (36.6  C) (Oral)   Resp 12   Ht 1.676 m (5' 6\")   Wt 72.6 kg (160 lb)   SpO2 95%   BMI 25.82 kg/m      O2: room air     Output: Patient continent of bowel and bladder. Uses commode at bedside    Last BM: 7/7/2021    Activity: Gait belt plus two assist. Pivots body weight.    Skin: Bruising on Left knee and Left hip.     Pain: States lots of pain in left hip during movement. Little to no pain when not moving.     CMS: Denies numbing and tingling in hands and feet.    Dressing: Dressing on knee and hip (Left side)    Diet: regular    LDA: no lines, drains, or PIV access    Equipment: gait belt, walker    Plan: Found TCU placement. Awaiting discharge process. Continue pain control and monitor output.    Additional Info: Patient is very compliant and kind. Patient feels relaxed and comfortable when daughter is present in the room. Enjoys talking about where she is from (childhood) - [place]. Patient is nervous about the transfer process. Takes Pills one at a time  Needs to clear throat and spit frequently  May forget names of people or conversations.    "

## 2021-07-08 NOTE — PROGRESS NOTES
Care Management Discharge Note    Discharge Date: 07/08/21 @ 1:15 pm     Discharge Disposition: TCU    [facility]  [address] [place] [address]  Phone: [phone]  Fax: [phone]    Discharge Services: None    Discharge DME: None    Discharge Transportation: [facility] w/c ride @ 1:15 pm    Private pay costs discussed: transportation costs    PAS Confirmation Code:  [id number]  Patient/family educated on Medicare website which has current facility and service quality ratings: yes    Education Provided on the Discharge Plan:  yes  Persons Notified of Discharge Plans: Pt, pt's family, 5O charge RN, bedside RN  Patient/Family in Agreement with the Plan: yes    Handoff Referral Completed: No (No PCP on file)    Additional Information:  Pt is discharging to TCU today.  Pt and family updated yesterday (7/7) and are in agreement with the plan.     ROB faxed discharge orders to [facility] and put the PAS code on the fax cover sheet. ROB faxed hard script.     ROB left a VM for Eleni Toscano in admissions at [facility] (contact above) to inform her that ROB faxed orders and hard script.     ADDEND 0911: ROB rec'd an email from the pt's daughter Marisol, expressing concern about the pt being in a private room.  ROB called Marisol and discussed - Marisol asked if any of the other pending facilities would have a private room. ROB relayed that the only two left were [facility], which ROB found out is NOT in network for [facility] and [facility] did not return ROB's phone call regarding referrals.  ROB advised that Marisol talk with [facility] and see if the pt can get on a waiting list for one of their private rooms.  ROB also provided information on how to appeal through Medicare if the family/pt disagreed with discharge.  Marisol stated that the family does NOT plan to appeal and they will ask [facility] to be put on a waiting list for a private room.       Rafaela Briseno  [facility]  [facility]  5 Ortho & 8A   Ph: [phone]  Pager: [phone]

## 2021-07-08 NOTE — PLAN OF CARE
Physical Therapy Discharge Summary    Reason for therapy discharge:    Discharged to transitional care facility.    Progress towards therapy goal(s). See goals on Care Plan in [facility] electronic health record for goal details.  Goals partially met.  Barriers to achieving goals:   limited tolerance for therapy.    Therapy recommendation(s):    Continued therapy is recommended.  Rationale/Recommendations:  to progress safety and independence with functional mobility prior to return home.

## 2021-07-08 NOTE — PLAN OF CARE
Occupational Therapy Discharge Summary    Reason for therapy discharge:    Discharged to transitional care facility.    Progress towards therapy goal(s). See goals on Care Plan in [facility] electronic health record for goal details.  Goals partially met.  Barriers to achieving goals:   discharge from facility.    Therapy recommendation(s):    Continued therapy is recommended.  Rationale/Recommendations:  limited self-cares/mobility.

## 2021-07-08 NOTE — PROGRESS NOTES
"[facility] [place]   Internal Medicine Daily Note           Interval History/Events     Overnight events reviewed  Reports doing well  No nausea, vomiting   Pain controlled.        Review of Systems        4 point ROS including Respiratory, CV, GI and , other than that noted above is negative      Medications   I have reviewed current medications  in the \"current medication\" section of Epic.  Relevant changes include:     Physical Exam   General:       Vital signs:    Blood pressure 123/48, pulse 64, temperature 97.4  F (36.3  C), temperature source Oral, resp. rate 16, height 1.676 m (5' 6\"), weight 72.6 kg (160 lb), SpO2 99 %.  Estimated body mass index is 25.82 kg/m  as calculated from the following:    Height as of this encounter: 1.676 m (5' 6\").    Weight as of this encounter: 72.6 kg (160 lb).      Intake/Output Summary (Last 24 hours) at 7/2/2021 1439  Last data filed at 7/2/2021 1300  Gross per 24 hour   Intake --   Output 200 ml   Net -200 ml        Constitutional: Laying in bed in no acute distress  Eye: No icterus, no pallor  Mouth/ENT: Normal oral mucosa  Cardiovascular:  S1, S2 normal.   Respiratory: B/L CTA  GI: Soft, NT, BS+  : No gutierrez's catheter  Neurology: Alert, awake, and oriented.   Psych: Mood stable.   MSK:   Integumentary:   Heme/Lymph/Imm:      Laboratory and Imaging Studies     I have reviewed  laboratory and imaging studies in the Epic. Pertinent findings are as below:    BMP  Recent Labs   Lab 07/06/21  0825 07/03/21  0618   CR 0.63 0.62     CBC  Recent Labs   Lab 07/06/21  0825 07/03/21  0618   * 687*     INRNo lab results found in last 7 days.  LFTsNo lab results found in last 7 days.   PANCNo lab results found in last 7 days.        Impression/Plan        86 year old female admitted on 6/25/2021 for evaluation of left intra trochanteric fracture. She had a fall. No report or syncope or LOC. She has a PMHx significant for HTN, Osteoporosis, " Dementia and Dyslipidemia.    Now S/P left hip IM nailing on 6/26/21      # Left intratrochanteric fracture  # s/p  IM nailing on 6/26  Management primarily per Ortho team.  - Wound cares, Dressings, Surgical pain management, DVT Prophylaxis  per primary team.   - Monitor anemia, hemodynamics, Input/Output, Capnography (for 24 hours)  - Encourage Incentive spirometry  - Laxatives for constipation prophylaxis     # Anemia: Most likely due to blood loss, and post surgical inflammation.   Hg level at 9 gm/dl on 06/28  - Transfuse if Hg < 7 gm/dl       # HTN   # Dyslipimdemia   Resume home dose of Accupril.   Hydrochlorothiazide  resumed  7/1  -Continue on PTA Lipitor.      # Leucocytosis   Likely stress demargination. Improving.   No dysuria, and frequency. No lower abdominal pain. Urine culture growing E coli, likely bacteriuria.        # Dementia   # Worseining encephalopathy   Had bedside attendant placed, now removed.   Continue on PTA Aricept  Long discussion  with son and daughter with regards to course of dementia and encephalopathy in the immediate post operative period. They hoped to have a geriatrician involved, and Dr Krishna Viera kindly agreed to see patient and discuss with family. It I likely that in the best case scenario, patient should go to memory care NH with rehabilitation   Improving        # Acute post operative urinary retention   Post operative. Requiring intermittent Straight cath   Discussed with patient and family in detail  - Check PVR, straight cath for PVR > 350 ml  - If > 3 straight cath in 24 hours period, then placed Lizarraga's catheter.         # Osteoporosis   -Continue on Vitamin D        # Diet:   Regular Diet Adult    # DVT Prophylaxis: Lovenox  # Lizarraga Catheter: Not present  # Central Lines: None  # Disposition: Awaiting placement, SW aware. Okay to discharge from medicine standpoint          Pt's care was discussed with bedside RN, patient and  during Care Team Rounds.                Sai Villar MD  Hospitalist ( Internal medicine)  Pager: [phone]   }

## 2021-07-08 NOTE — PLAN OF CARE
VS: VSS.   O2: Room air >90%.   Output: Voiding frequently in bedside commode.   Last BM: Multiple BMs-almost with every void. Small and loose.   Activity: Up with Ax1 and walker.    Skin: Intact except incision.   Pain: Reports pain with movement. Has Oxy PRN 2.5mg.Scheduled medications given. Declines ice.   CMS: Intact.   Dressing: CDI.   Diet: Regular.   LDA: None.   Equipment: Personal belongings.   Plan: Discharge today @ 1315 to [facility] via  EMS.    Additional Info: Confusion/ Disoriented to situation. Pt very anxious with almost anything but not impulsive-calming oil given. Bed alarm.

## 2021-07-08 NOTE — PLAN OF CARE
"VS: /48 (BP Location: Right arm)   Pulse 64   Temp 97.4  F (36.3  C) (Oral)   Resp 16   Ht 1.676 m (5' 6\")   Wt 72.6 kg (160 lb)   SpO2 99%   BMI 25.82 kg/m     O2: Stable on RA.   Output: Voiding frequently in bedside commode with assist of 1.   Last BM: Multiple Small and loose BM.   Activity: Up with Ax1 and walker.    Skin: Intact except incision.   Pain: Reports pain with movement. Has Oxy PRN 2.5mg.Scheduled medications given.   CMS: Intact.   Dressing: CDI.   Diet: Regular.   LDA: None.   Equipment: Personal belongings.   Plan: Discharge today @ 1315 to [facility] via  EMS.    Additional Info: Confusion/ Disoriented to situation. Pt very anxious bed alarm on daughter at bedside.   Report was given to the admitting nurse at [facility].   Patient vital signs are at baseline: Yes  Patient able to ambulate as they were prior to admission or with assist devices provided by therapies during their stay:  Yes  Patient MUST void prior to discharge:  Yes  Patient able to tolerate oral intake:  Yes  Pain has adequate pain control using Oral analgesics:  Yes     DISCHARGE SUMMARY    Pt discharging to: [facility] via  EMS at 1:30pm.  Transportation:  EMS wheel chair ride.  AVS given and discussed: paper work printed and given to the transport personal.  Medications given: Narcotic hard script sent with paper work.  Belongings returned: family took pt belongings with.  Comments: no concern at the time of discharge.            "

## 2021-07-09 NOTE — TELEPHONE ENCOUNTER
[facility] Call Center    Phone Message    May a detailed message be left on voicemail: yes     Reason for Call: Other: Emeka pt's son, would like a call back to discuss:    1. Permission for toe touch as part of rehab ASAP   Insurance may not cover for rehab if there are no improvement today or tomorrow.   2. Pt is admitted to [facility]      Action Taken: Message routed to:  [facility] ortho    Travel Screening: Not Applicable

## 2021-07-09 NOTE — TELEPHONE ENCOUNTER
Patient's Son was called to discuss his questions.  He stated that she is in a time period for 48 hours that her insurance will pull coverage if she is not progressing.      He questioned her WB status and the chart shows conflicting information.  The last progress note from Orthopedics on 7/7/21 stated TTWBx6 weeks.  The AVS stated NWBx6 weeks.  For now until Dr. Mckenzie can be consulted she should remain NWB.  He was told to have the Care facility call the clinic with any questions on her activity level. Our number was given to him and he will relay this to his sister who is with the patient at the facility.  He was agreeable with this plan.

## 2021-07-19 NOTE — TELEPHONE ENCOUNTER
[facility] Call Center    Phone Message:  Pt's son and POA, Perfecto (Emeka) called, stating pt is soon to be discharged from the REHAB care center that she is currently at.  Perfecto and his sister, Marisol (pt's Financial POA) feel that the pt (their mother) is not ready to come home at this time, and they are requesting an extension of Rehab at the care facility, based on how MD Jonah feels after reviewing pt's chart.      Perfecto (pt's son and POA) would like a call back at [phone], in regards to any follow up and the plan of care.  Perfecto stated if you cannot reach him, please call his sister Debi (pt's daughter and Financial POA) at [phone].    May a detailed message be left on voicemail: Yes     Reason for Call: Other: Family Call Back: Extended Stay, REHAB     Action Taken: Message routed to:  [facility] Team    Travel Screening: Not Applicable

## 2021-07-20 NOTE — TELEPHONE ENCOUNTER
Patient's son was called back.  He stated taht he did get the information for the MyChart that was sent last evening.  He was understanding of the information.  He was told that the writer will send a message to the clinic  to see if there are any other resources or options that have been overlooked.  He was thankful for this and will await her call.

## 2021-08-04 NOTE — NURSING NOTE
Reason For Visit:   Chief Complaint   Patient presents with     Surgical Followup     6 week pop DOS: 6/26/21 LEFT OPEN REDUCTION INTERNAL FIXATION, FRACTURE, FEMUR, USING INTRAMEDULLARY MICHELINE        PCP: No Ref-Primary, Physician      ?  No  Occupation Retired - .  Currently working? No.  Work status?  Retired.  Date of injury: 6/25/21  Type of injury: Fall.  Date of surgery: 6/26/21  Type of surgery: Left Hip Intramedullary Nail.  Smoker: No  Request smoking cessation information: No        There were no vitals taken for this visit.      Pain Assessment  Patient Currently in Pain: Denies (Pain only with movement)    Divya Clark, ATC

## 2021-08-04 NOTE — LETTER
8/4/2021         RE: Debi Morton  [address]  [address] [place] [id number]        Dear Colleague,    Thank you for referring your patient, Debi Morton, to the [facility] [place]. Please see a copy of my visit note below.    CHIEF CONCERN: Status post ORIF left intertrochanteric femur fracture  DATE OF SURGERY: 6/26/21    HISTORY OF PRESENT ILLNESS: Debi Morton is a pleasant 86 year-old female who is 6 weeks status post the above procedure. She presents with her daughter and son who are her medical decision makers secondary to her dementia. They report she has been doing well. Participating in therapy 1x/week, remains NWB focusing on transfers. Only having pain over her lateral thigh at the region above her screw heads when sleeping on side or rolling. The family is hoping to change TCUs once she is WBAT in order to get her more PT. [facility].     EXAM:  Pleasantly demented adult female in NAD  Respirations even and unlabored.  Left lower extremity: Incisions clean, dry, and intact. Distally neurovascularly intact without deficits. Moderate onychodystrophy bilaterally.    IMAGING:  Xrays of the left femur, 2 views taken today, were independently reviewed and demonstrate stable alignment of the intertrochanteric femur fracture without evidence of hardware failure.    ASSESSMENT:  1. Six weeks status post above procedure, doing well    PLAN:  - Transition to WBAT at this point using walker  - Therapy orders written for advancing ambulation and gait training as well as general strengthening  - Follow up in another 6 weeks with repeat xrays AP/Lat of the left femur.    Patient discussed and seen with Dr. Jonah MD, orthopedic surgery staff.    --  Vijay Carlos MD  Orthopedic Surgery PGY-1    I have personally examined this patient and have reviewed the clinical presentation and  progress note with the resident.  I agree with the treatment plan as outlined.  The plan was formulated with the resident on the day of the resident's note.       Answers for HPI/ROS submitted by the patient on 8/3/2021  General Symptoms: No  Skin Symptoms: No  HENT Symptoms: No  EYE SYMPTOMS: Yes  HEART SYMPTOMS: No  LUNG SYMPTOMS: No  INTESTINAL SYMPTOMS: No  URINARY SYMPTOMS: Yes  GYNECOLOGIC SYMPTOMS: No  BREAST SYMPTOMS: No  SKELETAL SYMPTOMS: No  BLOOD SYMPTOMS: No  NERVOUS SYSTEM SYMPTOMS: No  MENTAL HEALTH SYMPTOMS: Yes  Eye pain: No  Vision loss: No  Dry eyes: Yes  Watery eyes: Yes  Eye bulging: No  Double vision: No  Flashing of lights: No  Spots: No  Floaters: No  Redness: No  Crossed eyes: No  Tunnel Vision: No  Yellowing of eyes: No  Eye irritation: Yes  Trouble holding urine or incontinence: No  Pain or burning: No  Trouble starting or stopping: Yes  Increased frequency of urination: No  Blood in urine: No  Decreased frequency of urination: Yes  Frequent nighttime urination: No  Flank pain: No  Difficulty emptying bladder: Yes  Nervous or Anxious: Yes  Depression: No  Trouble sleeping: No  Trouble thinking or concentrating: No  Mood changes: Yes  Panic attacks: No

## 2021-08-04 NOTE — PROGRESS NOTES
CHIEF CONCERN: Status post ORIF left intertrochanteric femur fracture  DATE OF SURGERY: 6/26/21    HISTORY OF PRESENT ILLNESS: Debi Morton is a pleasant 86 year-old female who is 6 weeks status post the above procedure. She presents with her daughter and son who are her medical decision makers secondary to her dementia. They report she has been doing well. Participating in therapy 1x/week, remains NWB focusing on transfers. Only having pain over her lateral thigh at the region above her screw heads when sleeping on side or rolling. The family is hoping to change TCUs once she is WBAT in order to get her more PT. Hope is to return to [facility].     EXAM:  Pleasantly demented adult female in NAD  Respirations even and unlabored.  Left lower extremity: Incisions clean, dry, and intact. Distally neurovascularly intact without deficits. Moderate onychodystrophy bilaterally.    IMAGING:  Xrays of the left femur, 2 views taken today, were independently reviewed and demonstrate stable alignment of the intertrochanteric femur fracture without evidence of hardware failure.    ASSESSMENT:  1. Six weeks status post above procedure, doing well    PLAN:  - Transition to WBAT at this point using walker  - Therapy orders written for advancing ambulation and gait training as well as general strengthening  - Follow up in another 6 weeks with repeat xrays AP/Lat of the left femur.    Patient discussed and seen with Dr. Jonah MD, orthopedic surgery staff.    --  Vijay Carlos MD  Orthopedic Surgery PGY-1    I have personally examined this patient and have reviewed the clinical presentation and progress note with the resident.  I agree with the treatment plan as outlined.  The plan was formulated with the resident on the day of the resident's note.       Answers for HPI/ROS submitted by the patient on 8/3/2021  General Symptoms: No  Skin Symptoms: No  HENT Symptoms: No  EYE SYMPTOMS: Yes  HEART  SYMPTOMS: No  LUNG SYMPTOMS: No  INTESTINAL SYMPTOMS: No  URINARY SYMPTOMS: Yes  GYNECOLOGIC SYMPTOMS: No  BREAST SYMPTOMS: No  SKELETAL SYMPTOMS: No  BLOOD SYMPTOMS: No  NERVOUS SYSTEM SYMPTOMS: No  MENTAL HEALTH SYMPTOMS: Yes  Eye pain: No  Vision loss: No  Dry eyes: Yes  Watery eyes: Yes  Eye bulging: No  Double vision: No  Flashing of lights: No  Spots: No  Floaters: No  Redness: No  Crossed eyes: No  Tunnel Vision: No  Yellowing of eyes: No  Eye irritation: Yes  Trouble holding urine or incontinence: No  Pain or burning: No  Trouble starting or stopping: Yes  Increased frequency of urination: No  Blood in urine: No  Decreased frequency of urination: Yes  Frequent nighttime urination: No  Flank pain: No  Difficulty emptying bladder: Yes  Nervous or Anxious: Yes  Depression: No  Trouble sleeping: No  Trouble thinking or concentrating: No  Mood changes: Yes  Panic attacks: No

## 2021-08-19 NOTE — PROGRESS NOTES
[place] [facility]  PRIMARY CARE PROVIDER AND CLINIC:  Tu Temple, APRN CNP, [address] / [place] [address]  Chief Complaint   Patient presents with     [facility] Medical Record Number:  [id number]  Place of Service where encounter took place:  THE [facility]) [[id number]]    Debi Morton  is a 86 year old  (11/10/1934), admitted to the above facility from [facility] TCU, Recent hospital stay at [facility] 6/25/21 - 7/8/21..  Admitted to this facility for  medical management and nursing care.    HPI:    HPI information obtained from: facility chart records, facility staff, patient report, [facility] chart review, Care Everywhere Epic chart review and family/first contact daughter Marisol Morton report.   Brief Summary of Hospital Course:   [place] in 9[place] to be closer to her family. We were asked to assume primary care in 11/2020. History of cognitive decline and MRI brain 1/27/2020 showed moderate volume loss with progressive atrophy and progressive chronic small vessel disease compared to 2012. She was seen by Dr Mirza at the [facility] 9/11/2020 who felt she likely has Alzheimer's type dementia.   Neuropsychiatric testing was done 9/23/2020 and revealed deficits in attention, learning, memory and language. She had  MMSE 19/30 and CPT 4.5/5.6. Aricept  was started at follow up appt with Dr Mirza 10/7/2020.     She was hospitalized 6/25/2021 after a fall resulting in a left intertrochanteric femur  fracture. She underwent IM nailing 6/26/2021 by Dr Mckenzie. Post op course was complicated by encephalopathy and urinary retention.    She discharged to [facility] tcu for rehab. No records are available re: her tcu stay, except for a therapy note and med list.   She had Ortho follow up 8/4/2021 and was advanced to WBAT. Appears she was ambulating 100 ft with  walker and assist at tcu discharge.   She returned to [facility] 8/17/2021 and moved from [place] to [facility].       Updates on Status Since Skilled nursing Admission:   Reports feeling well with minimal pain. She's aware that she had surgery for a broken hip and is also aware that she's back in a familiar environment. Very pleasant and talkative. Good appetite. Denies feeling ill. No constipation. Ambulating with walker and assist of 1. Requires stand by to min assist of 1 with transfers and cares       CODE STATUS/ADVANCE DIRECTIVES DISCUSSION:   DNR/DNI  Patient's living condition: lives in an assisted living facility  ALLERGIES: Penicillins and Penicillins  PAST MEDICAL HISTORY:  has a past medical history of Age-related osteoporosis without current pathological fracture, Dementia (H), Gastroesophageal reflux disease, Hypercalcemia, Hyperlipidemia, Hypertension, Late onset Alzheimer's disease without behavioral disturbance (H), Lipidemia, Osteoporosis, Tremor, Venous insufficiency, Vitamin D deficiency, and Elvin-Parkinson-White (WPW) pattern.  PAST SURGICAL HISTORY:   has a past surgical history that includes Hysterectomy and Open reduction internal fixation rodding intramedullary femur (Left, 6/26/2021).  FAMILY HISTORY: family history is not on file.  SOCIAL HISTORY:   reports that she has quit smoking. She has never used smokeless tobacco. She reports previous alcohol use. She reports that she does not use drugs.    Post Discharge Medication Reconciliation Status: discharge medications reconciled, continue medications without change    Current Outpatient Medications   Medication Sig Dispense Refill     acetaminophen (TYLENOL) 325 MG tablet Take 3 tablets (975 mg) by mouth every 8 hours as needed for mild pain 60 tablet 0     alendronate (FOSAMAX) 70 MG tablet Take 1 tablet (70 mg) by mouth every 7 days 4 tablet 11     aspirin (ASA) 81 MG chewable tablet Take 81 mg by mouth daily        atorvastatin (LIPITOR) 10 MG tablet Take 1 tablet (10 mg) by mouth daily 30 tablet 11     calcium carbonate (TUMS) 500 MG chewable tablet Take 1 tablet (500 mg) by mouth daily 30 tablet 11     Cholecalciferol (VITAMIN D3) 1.25 MG (79290 UT) TABS Take 50,000 Units by mouth once a week       cyanocobalamin (VITAMIN B-12) 100 MCG tablet Take 1 tablet (100 mcg) by mouth daily 30 tablet 11     donepezil (ARICEPT) 10 MG tablet Take 1 tablet (10 mg) by mouth daily 30 tablet 11     hydrochlorothiazide (HYDRODIURIL) 25 MG tablet Take 1 tablet (25 mg) by mouth every other day 15 tablet 11     Multiple Vitamins-Minerals (PRESERVISION AREDS) TABS Take 1 tablet by mouth daily 30 tablet 11     polyethylene glycol (MIRALAX) 17 g packet Take 17 g by mouth daily 7 packet 0     quinapril (ACCUPRIL) 10 MG tablet Take 10 mg by mouth At Bedtime       traZODone (DESYREL) 50 MG tablet Take 25 mg by mouth At Bedtime           ROS:  Limited secondary to cognitive impairment. Positives as noted under HPI     Vitals:  Temp 96.9  F (36.1  C)   Exam:  GENERAL APPEARANCE:  Alert, in no distress, thin  ENT:  [place], oropharynx clear  EYES:  EOM normal, conjunctiva and lids normal  NECK: no adenopathy,masses or thyromegaly  RESP:  lungs clear to auscultation , no respiratory distress  CV:  regular rate and rhythm, no murmur, no edema, +2 pedal pulses  ABDOMEN:  soft, non-tender, no distension, no masses  M/S:   resting in bed. YANCEY with good strength. No joint inflammation  SKIN:  left hip incision healed. No rashes or open areas  PSYCH:  oriented to self, place, insight and judgement impaired, memory impaired , affect and mood normal    Lab/Diagnostic data:  Recent labs in [facility] reviewed by me today.     ASSESSMENT / PLAN:  (S72.002D) Closed fracture of left hip with routine healing, subsequent encounter  (primary encounter diagnosis)  (Z98.890,  Z87.81) S/P ORIF (open reduction internal fixation) fracture  Comment: appears comfortable with no pain.  Incision healed. Last Ortho appointment was 8/4/2021.   Plan: continue prn tylenol. Discontinue tizanidine due to potential side effects. Continue ASA. Ortho follow up as scheduled     (M80.00XD) Age-related osteoporosis with current pathological fracture with routine healing, subsequent encounter  (E55.9) Vitamin D deficiency  Comment: chronic.   Plan: continue fosamax, calcium and vitamin D. Obtain vitamin D level in the near future.     (G30.1,  F02.80) Late onset Alzheimer's disease without behavioral disturbance (H)  Comment: cognition appears to be back to baseline with moderate deficits. No cognitive scores from tcu stay available   Daughter has noticed increased anxiety later in the day. No behavior issues reported by staff.  Plan: continue donepezil and trazodone. Memory Care staff to assist with all cares, meals and med administration     (D62) Anemia due to blood loss, acute  Comment: Hgb 11.5 on 8/10/2021  Plan: Hgb next lab day    Hypokalemia  Comment: K 3.3 on 8/10/2021. Unclear if this was replaced  Plan: BMP next lab day    (I10) Essential hypertension  Comment: quinapril dose decreased while on tcu. No VS have been taken since returning to [place]  Plan: continue quinapril 10 mg daily and HCTZ 25 mg every other day. VS per facility routine. BMP. Avoid hypotension due to advanced age and fall risk     (E78.5) Hyperlipidemia, unspecified hyperlipidemia type  Comment: lipid panel 4/29/2021 with acceptable control, , HDL 64  Plan: continue statin per family preference     (H35.30) Macular degeneration (senile) of retina  Comment: chronic   Plan: continue Preservision. Follow up Eye Clinic when able.     (R53.81) Physical deconditioning  Comment: due to hip fracture, surgery and comorbidities   Plan: referral was made at tcu discharge for PHYSICAL THERAPY/OT with [facility]. [place] nurse will follow up to ensure services are started.         Total time spent with patient visit at the assisted living   facility was 40 mins including patient visit, review of past records and phone call to patient contact. Greater than 50% of total time spent with counseling and coordinating care due to re-establishing care at the facility by coordinating the following with facility staff: admission orders, medication orders, home care referral, pain management, follow up labs and Ortho appt. Counseling patient and daughter re: medication and potential side effects, rationale for follow up labs, follow up Ortho appointment, home care referral for ongoing therapies, plan of care on Memory Care.     Electronically signed by:  CESAR Best CNP

## 2021-08-19 NOTE — LETTER
8/19/2021        RE: Debi Morton  [facility]  [address]  [address]  [place] [address]        [place] [facility]  PRIMARY CARE PROVIDER AND CLINIC:  CESAR Best CNP, [address] [address] / [facility] [address]  Chief Complaint   Patient presents with     [facility] [place] [facility]     [place] Medical Record Number:  [id number]  Place of Service where encounter took place:  THE [facility] ([facility]) [[id number]]    Debi Morton  is a 86 year old  (11/10/1934), [facility] Recent hospital stay at [facility] 6/25/21 - 7/8/21..  Admitted to this facility for  medical management and nursing care.    HPI:    HPI information obtained from: facility chart records, facility staff, patient report, [facility] chart review, Care Everywhere Epic chart review and family/first contact daughter Marisol Morton report.   Brief Summary of Hospital Course:   [place] in [place] to [place] closer to her family. We were asked to assume primary care in 11/2020. History of cognitive decline and MRI brain 1/27/2020 showed moderate volume loss with progressive atrophy and progressive chronic small vessel disease compared to 2012. She was seen by Dr Mirza at the [facility] 9/11/2020 who felt she likely has Alzheimer's type dementia.   Neuropsychiatric testing was done 9/23/2020 and revealed deficits in attention, learning, memory and language. She had  MMSE 19/30 and CPT 4.5/5.6. Aricept  was started at follow up appt with Dr Mirza 10/7/2020.     She was hospitalized 6/25/2021 after a fall resulting in a left intertrochanteric femur  fracture. She underwent IM nailing 6/26/2021 by Dr Mckenzie. Post op course was complicated by encephalopathy and urinary retention.    [facility]. No records are available re: her tcu stay, except for a  therapy note and med list.   She had Ortho follow up 8/4/2021 and was advanced to WBAT. Appears she was ambulating 100 ft with walker and assist at tcu discharge.   [facility] to [facility] 8/17/2021 and moved from [place] to [facility].       Updates on Status Since Skilled nursing Admission:   Reports feeling well with minimal pain. She's aware that she had surgery for a broken hip and is also aware that she's back in a familiar environment. Very pleasant and talkative. Good appetite. Denies feeling ill. No constipation. Ambulating with walker and assist of 1. Requires stand by to min assist of 1 with transfers and cares       CODE STATUS/ADVANCE DIRECTIVES DISCUSSION:   DNR/DNI  Patient's living condition: lives in an assisted living facility  ALLERGIES: Penicillins and Penicillins  PAST MEDICAL HISTORY:  has a past medical history of Age-related osteoporosis without current pathological fracture, Dementia (H), Gastroesophageal reflux disease, Hypercalcemia, Hyperlipidemia, Hypertension, Late onset Alzheimer's disease without behavioral disturbance (H), Lipidemia, Osteoporosis, Tremor, Venous insufficiency, Vitamin D deficiency, and Elvin-Parkinson-White (WPW) pattern.  PAST SURGICAL HISTORY:   has a past surgical history that includes Hysterectomy and Open reduction internal fixation rodding intramedullary femur (Left, 6/26/2021).  FAMILY HISTORY: family history is not on file.  SOCIAL HISTORY:   reports that she has quit smoking. She has never used smokeless tobacco. She reports previous alcohol use. She reports that she does not use drugs.    Post Discharge Medication Reconciliation Status: discharge medications reconciled, continue medications without change    Current Outpatient Medications   Medication Sig Dispense Refill     acetaminophen (TYLENOL) 325 MG tablet Take 3 tablets (975 mg) by mouth every 8 hours as needed for mild pain 60 tablet 0     alendronate (FOSAMAX) 70 MG tablet Take  1 tablet (70 mg) by mouth every 7 days 4 tablet 11     aspirin (ASA) 81 MG chewable tablet Take 81 mg by mouth daily       atorvastatin (LIPITOR) 10 MG tablet Take 1 tablet (10 mg) by mouth daily 30 tablet 11     calcium carbonate (TUMS) 500 MG chewable tablet Take 1 tablet (500 mg) by mouth daily 30 tablet 11     Cholecalciferol (VITAMIN D3) 1.25 MG (90135 UT) TABS Take 50,000 Units by mouth once a week       cyanocobalamin (VITAMIN B-12) 100 MCG tablet Take 1 tablet (100 mcg) by mouth daily 30 tablet 11     donepezil (ARICEPT) 10 MG tablet Take 1 tablet (10 mg) by mouth daily 30 tablet 11     hydrochlorothiazide (HYDRODIURIL) 25 MG tablet Take 1 tablet (25 mg) by mouth every other day 15 tablet 11     Multiple Vitamins-Minerals (PRESERVISION AREDS) TABS Take 1 tablet by mouth daily 30 tablet 11     polyethylene glycol (MIRALAX) 17 g packet Take 17 g by mouth daily 7 packet 0     quinapril (ACCUPRIL) 10 MG tablet Take 10 mg by mouth At Bedtime       traZODone (DESYREL) 50 MG tablet Take 25 mg by mouth At Bedtime           ROS:  Limited secondary to cognitive impairment. Positives as noted under HPI     Vitals:  Temp 96.9  F (36.1  C)   Exam:  GENERAL APPEARANCE:  Alert, in no distress, thin  ENT:  [place], oropharynx clear  EYES:  EOM normal, conjunctiva and lids normal  NECK: no adenopathy,masses or thyromegaly  RESP:  lungs clear to auscultation , no respiratory distress  CV:  regular rate and rhythm, no murmur, no edema, +2 pedal pulses  ABDOMEN:  soft, non-tender, no distension, no masses  M/S:   resting in bed. YANCEY with good strength. No joint inflammation  SKIN:  left hip incision healed. No rashes or open areas  PSYCH:  oriented to self, place, insight and judgement impaired, memory impaired , affect and mood normal    Lab/Diagnostic data:  Recent labs in [facility] reviewed by me today.     ASSESSMENT / PLAN:  (S72.002D) Closed fracture of left hip with routine healing, subsequent encounter  (primary encounter  diagnosis)  (Z98.890,  Z87.81) S/P ORIF (open reduction internal fixation) fracture  Comment: appears comfortable with no pain. Incision healed. Last Ortho appointment was 8/4/2021.   Plan: continue prn tylenol. Discontinue tizanidine due to potential side effects. Continue ASA. Ortho follow up as scheduled     (M80.00XD) Age-related osteoporosis with current pathological fracture with routine healing, subsequent encounter  (E55.9) Vitamin D deficiency  Comment: chronic.   Plan: continue fosamax, calcium and vitamin D. Obtain vitamin D level in the near future.     (G30.1,  F02.80) Late onset Alzheimer's disease without behavioral disturbance (H)  Comment: cognition appears to be back to baseline with moderate deficits. No cognitive scores from tcu stay available   Daughter has noticed increased anxiety later in the day. No behavior issues reported by staff.  Plan: continue donepezil and trazodone. Memory Care staff to assist with all cares, meals and med administration     (D62) Anemia due to blood loss, acute  Comment: Hgb 11.5 on 8/10/2021  Plan: Hgb next lab day    Hypokalemia  Comment: K 3.3 on 8/10/2021. Unclear if this was replaced  Plan: BMP next lab day    (I10) Essential hypertension  Comment: quinapril dose decreased while on tcu. No VS have been taken since returning to [place]  Plan: continue quinapril 10 mg daily and HCTZ 25 mg every other day. VS per facility routine. BMP. Avoid hypotension due to advanced age and fall risk     (E78.5) Hyperlipidemia, unspecified hyperlipidemia type  Comment: lipid panel 4/29/2021 with acceptable control, , HDL 64  Plan: continue statin per family preference     (H35.30) Macular degeneration (senile) of retina  Comment: chronic   Plan: continue Preservision. Follow up Eye Clinic when able.     (R53.81) Physical deconditioning  Comment: due to hip fracture, surgery and comorbidities   Plan: referral was made at tcu discharge for PHYSICAL THERAPY/OT with [facility]. [place] nurse will follow up to ensure services are started.         Total time spent with patient visit at the assisted living  facility was 40 mins including patient visit, review of past records and phone call to patient contact. Greater than 50% of total time spent with counseling and coordinating care due to re-establishing care at the facility by coordinating the following with facility staff: admission orders, medication orders, home care referral, pain management, follow up labs and Ortho appt. Counseling patient and daughter re: medication and potential side effects, rationale for follow up labs, follow up Ortho appointment, home care referral for ongoing therapies, plan of care on Memory Care.     Electronically signed by:  CESAR Best CNP                           Sincerely,        CESAR Best CNP

## 2021-09-15 NOTE — LETTER
9/15/2021         RE: Debi Morton  [address] [place] [id number]        Dear Colleague,    Thank you for referring your patient, Debi Morton, to the [facility] [place]. Please see a copy of my visit note below.    CHIEF CONCERN: Status post ORIF left intertrochanteric femur fracture  DATE OF SURGERY: 6/26/21    HISTORY OF PRESENT ILLNESS: Debi Morton is a pleasant 86 year-old female who is 12 weeks status post the above procedure. She presents with her daughter and son who are her medical decision makers secondary to her dementia. They report she has been doing well. She has discharged from the TCU, to home and has been ambulating with a walker. She has some mild pain from sitting to standing, but no pain with ambulation.     EXAM:  Pleasantly demented adult female in NAD  Respirations even and unlabored.  Left lower extremity: Incisions clean, dry, and intact. Distally neurovascularly intact without deficits.     IMAGING:  Xrays of the left femur, 2 views taken today, were independently reviewed and demonstrate stable alignment of the intertrochanteric femur fracture without evidence of hardware failure.    ASSESSMENT:  1. 3 months status post above procedure, doing well    PLAN:  - WBAT with walker  - Therapy orders written for advancing ambulation and gait training as well as general strengthening  - Follow up in another 3 months with repeat xrays AP/Lat of the left femur.    Allan Aguirre MD PGY-4   Orthopaedic Surgery Resident     ATTESTATION:  I have personally examined this patient and have reviewed the clinical presentation and progress note with the resident.  I agree with the treatment plan as outlined.  The plan was formulated with the resident on the day of the resident dictation.     Elizabeth Mckenzie MD

## 2021-09-15 NOTE — PROGRESS NOTES
CHIEF CONCERN: Status post ORIF left intertrochanteric femur fracture  DATE OF SURGERY: 6/26/21    HISTORY OF PRESENT ILLNESS: Debi Morton is a pleasant 86 year-old female who is 12 weeks status post the above procedure. She presents with her daughter and son who are her medical decision makers secondary to her dementia. They report she has been doing well. She has discharged from the TCU, to home and has been ambulating with a walker. She has some mild pain from sitting to standing, but no pain with ambulation.     EXAM:  Pleasantly demented adult female in NAD  Respirations even and unlabored.  Left lower extremity: Incisions clean, dry, and intact. Distally neurovascularly intact without deficits.     IMAGING:  Xrays of the left femur, 2 views taken today, were independently reviewed and demonstrate stable alignment of the intertrochanteric femur fracture without evidence of hardware failure.    ASSESSMENT:  1. 3 months status post above procedure, doing well    PLAN:  - WBAT with walker  - Therapy orders written for advancing ambulation and gait training as well as general strengthening  - Follow up in another 3 months with repeat xrays AP/Lat of the left femur.    Allan Aguirre MD PGY-4   Orthopaedic Surgery Resident     ATTESTATION:  I have personally examined this patient and have reviewed the clinical presentation and progress note with the resident.  I agree with the treatment plan as outlined.  The plan was formulated with the resident on the day of the resident dictation.     Elizabeth Mckenzie MD

## 2021-09-15 NOTE — NURSING NOTE
Reason For Visit:   Chief Complaint   Patient presents with     Surgical Followup     3 month pop DOS: 6/26/21 LEFT OPEN REDUCTION INTERNAL FIXATION, FRACTURE, FEMUR, USING INTRAMEDULLARY MICHELINE        PCP: No Ref-Primary, Physician        ?  No  Occupation Retired - .  Currently working? No.  Work status?  Retired.  Date of injury: 6/25/21  Type of injury: Fall.  Date of surgery: 6/26/21  Type of surgery: Left Hip Intramedullary Nail.  Smoker: No  Request smoking cessation information: No    There were no vitals taken for this visit.      Pain Assessment  Patient Currently in Pain: Denies (pain only when she moves around)    Divya Clark, ATC

## 2021-10-14 NOTE — PROGRESS NOTES
[employer] GERIATRIC SERVICES  [place] Medical Record Number:  [id number]  Place of Service where encounter took place:  THE [facility] ([facility]) [[id number]]  Chief Complaint   Patient presents with     RECHECK       HPI:    Debi Morton  is a 86 year old (11/10/1934), who is being seen today for an episodic care visit.  HPI information obtained from: facility chart records, facility staff, patient report, [facility] chart review and family/first contact daughter Marisol Morton report.   [place] in [place] to be closer to her family.  History of cognitive decline and MRI brain 1/27/2020 showed moderate volume loss with progressive atrophy and progressive chronic small vessel disease compared to 2012. She was seen by Dr Mirza at the [facility] 9/11/2020 who felt she likely has Alzheimer's type dementia.   Neuropsychiatric testing was done 9/23/2020 and revealed deficits in attention, learning, memory and language. She had  MMSE 19/30 and CPT 4.5/5.6. Aricept  was started at follow up appt with Dr Mirza 10/7/2020.   She was hospitalized at [facility]  6/25/2021 after a fall resulting in a left intertrochanteric femur  fracture. She underwent IM nailing 6/26/2021 by Dr Mckenzie. Post op course was complicated by encephalopathy and urinary retention.   She discharged to [facility] tcu for rehab. She had Ortho follow up 8/4/2021 and was advanced to WBAT.   She returned to [facility] 8/17/2021 and moved from AL to Memory Care.       Today's concern is:     Late onset Alzheimer's disease without behavioral disturbance (H)  Closed fracture of left hip with routine healing, subsequent encounter  S/P ORIF (open reduction internal fixation) fracture  Age-related osteoporosis with current pathological fracture with routine healing, subsequent encounter  Vitamin D deficiency  Anemia due to blood loss, acute  Essential  hypertension  Physical deconditioning   She is a fairly poor historian. Pleasant and talkative. Reports good appetite. Remembers that she had a broken hip and denies pain of any type. Has completed home therapies and is up and about with walker. Requires stand by to min assist with cares.       Past Medical and Surgical History reviewed in Epic today.    MEDICATIONS:    Current Outpatient Medications   Medication Sig Dispense Refill     acetaminophen (TYLENOL) 325 MG tablet Take 3 tablets (975 mg) by mouth every 8 hours as needed for mild pain 60 tablet 0     alendronate (FOSAMAX) 70 MG tablet Take 1 tablet (70 mg) by mouth every 7 days 4 tablet 11     ASPIRIN LOW DOSE 81 MG chewable tablet TAKE 1 TABLET BY MOUTH EVERY MORNING 36 tablet PRN     atorvastatin (LIPITOR) 10 MG tablet Take 1 tablet (10 mg) by mouth daily 30 tablet 11     calcium carbonate (TUMS) 500 MG chewable tablet Take 1 tablet (500 mg) by mouth daily 30 tablet 11     Cholecalciferol (VITAMIN D3) 1.25 MG (08767 UT) TABS Take 50,000 Units by mouth once a week       cyanocobalamin (VITAMIN B-12) 100 MCG tablet Take 1 tablet (100 mcg) by mouth daily 30 tablet 11     donepezil (ARICEPT) 10 MG tablet Take 1 tablet (10 mg) by mouth daily 30 tablet 11     hydrochlorothiazide (HYDRODIURIL) 25 MG tablet Take 1 tablet (25 mg) by mouth every other day 15 tablet 11     Multiple Vitamins-Minerals (PRESERVISION AREDS) TABS TAKE 1 TABLET BY MOUTH ONCE DAILY 28 tablet PRN     polyethylene glycol (MIRALAX) 17 g packet Take 17 g by mouth daily 7 packet 0     potassium chloride ER (KLOR-CON M) 20 MEQ CR tablet TAKE 1 TABLET BY MOUTH ONCE DAILY 28 tablet PRN     quinapril (ACCUPRIL) 10 MG tablet TAKE 1 TABLET BY MOUTH ONCE DAILY 34 tablet PRN     traZODone (DESYREL) 50 MG tablet TAKE ONE-HALF TABLET (25MG) BY MOUTH AT BEDTIME FOR INSOMNIA 14 tablet PRN         REVIEW OF SYSTEMS:  Limited secondary to cognitive impairment. Positives as noted under HPI.     Objective:  BP  116/60   Pulse 69   Temp 97.3  F (36.3  C)   Resp 19   Wt 52.1 kg (114 lb 12.8 oz)   BMI 18.53 kg/m    Exam:  GENERAL APPEARANCE:  Alert, in no distress, thin  ENT:  [place], oropharynx clear  EYES:  EOM normal, conjunctiva and lids normal  NECK: no adenopathy,masses or thyromegaly  RESP:  lungs clear to auscultation , no respiratory distress  CV:  regular rate and rhythm, no murmur, no edema, +2 pedal pulses  ABDOMEN:  soft, non-tender, no distension, no masses  M/S:   gait steady with walker. YANCEY with good strength. No joint inflammation  SKIN:  no rashes or open areas  PSYCH:  oriented to self, place, insight and judgement impaired, memory impaired , affect and mood normal    Labs:   Recent labs in [facility] reviewed by me today.     ASSESSMENT / PLAN:  (G30.1,  F02.80) Late onset Alzheimer's disease without behavioral disturbance (H)  (primary encounter diagnosis)  Comment: progressive disease. LACLS-5 score 3.6/5.7 indicating moderate deficits.   Resistive to bathing, but no other significant behavior issues.  Daughter reports patient has not had a bath or shower in many months and we discussed ways of managing this.   Plan: Memory Care [place] staff to assist with all cares, meals and med admin.  Bathing issues discussed with staff.     (S72.002D) Closed fracture of left hip with routine healing, subsequent encounter   (Z98.890,  Z87.81) S/P ORIF (open reduction internal fixation) fracture  Comment: s/p IM nailing 6/26/2021. Appears comfortable with cares and mobility. She saw Ortho 9/15/2021 and XR showed stable alignment   Plan: continue tylenol. Up with walker. Ortho follow up as scheduled.     (M80.00XD) Age-related osteoporosis with current pathological fracture with routine healing, subsequent encounter  (E55.9) Vitamin D deficiency  Comment: fracture healed.   Plan: continue fosamax, calcium, vitamin D     (D62) Anemia due to blood loss, acute  Comment: improved with Hgb 11.8  Plan: follow Hgb prn     (I10)  Essential hypertension  Comment: controlled with BPs: 117/58, 137/76  HR: 58-69   Quinapril was decreased while on tcu   Creatinine 0.72, K 3.9 on 2021  Plan: continue quinapril, HCTZ. Monitor VS and adjust doses as indicated. Follow BMP    (R53.81) Physical deconditioning  Comment: she has completed home therapies. Up ad finn with walker   Plan: encourage activity         Total time with an established patient visit in the assisted livin minutes including discussions about the POC and care coordination with the patient and family, daughter Marisol Morton. Greater than 50% of total time spent with counseling and coordinating care due to progressive dementia, resisting cares and recent hip fracture.       Electronically signed by:  CESAR Best CNP

## 2021-10-14 NOTE — LETTER
10/14/2021        RE: Debi Morton  [address] [address]        [place] GERIATRIC SERVICES  [place] Medical Record Number:  [id number]  Place of Service where encounter took place:  THE [facility] ([facility]) [[id number]]  Chief Complaint   Patient presents with     RECHECK       HPI:    Debi Morton  is a 86 year old (11/10/1934), who is being seen today for an episodic care visit.  HPI information obtained from: facility chart records, facility staff, patient report, [facility] chart review and family/first contact daughter Marisol Morton report.   [place] in [place] to be closer to her family.  History of cognitive decline and MRI brain 1/27/2020 showed moderate volume loss with progressive atrophy and progressive chronic small vessel disease compared to 2012. She was seen by Dr Mirza at the [facility] 9/11/2020 who felt she likely has Alzheimer's type dementia.   Neuropsychiatric testing was done 9/23/2020 and revealed deficits in attention, learning, memory and language. She had  MMSE 19/30 and CPT 4.5/5.6. Aricept  was started at follow up appt with Dr Mirza 10/7/2020.   She was hospitalized at [facility]  6/25/2021 after a fall resulting in a left intertrochanteric femur  fracture. She underwent IM nailing 6/26/2021 by Dr Mckenzie. Post op course was complicated by encephalopathy and urinary retention.   She discharged to [facility] tcu for rehab. She had Ortho follow up 8/4/2021 and was advanced to WBAT.   She returned to [facility] 8/17/2021 and moved from AL to Memory Care.       Today's concern is:     Late onset Alzheimer's disease without behavioral disturbance (H)  Closed fracture of left hip with routine healing, subsequent encounter  S/P ORIF (open reduction internal fixation) fracture  Age-related osteoporosis with current pathological  fracture with routine healing, subsequent encounter  Vitamin D deficiency  Anemia due to blood loss, acute  Essential hypertension  Physical deconditioning   She is a fairly poor historian. Pleasant and talkative. Reports good appetite. Remembers that she had a broken hip and denies pain of any type. Has completed home therapies and is up and about with walker. Requires stand by to min assist with cares.       Past Medical and Surgical History reviewed in Epic today.    MEDICATIONS:    Current Outpatient Medications   Medication Sig Dispense Refill     acetaminophen (TYLENOL) 325 MG tablet Take 3 tablets (975 mg) by mouth every 8 hours as needed for mild pain 60 tablet 0     alendronate (FOSAMAX) 70 MG tablet Take 1 tablet (70 mg) by mouth every 7 days 4 tablet 11     ASPIRIN LOW DOSE 81 MG chewable tablet TAKE 1 TABLET BY MOUTH EVERY MORNING 36 tablet PRN     atorvastatin (LIPITOR) 10 MG tablet Take 1 tablet (10 mg) by mouth daily 30 tablet 11     calcium carbonate (TUMS) 500 MG chewable tablet Take 1 tablet (500 mg) by mouth daily 30 tablet 11     Cholecalciferol (VITAMIN D3) 1.25 MG (24568 UT) TABS Take 50,000 Units by mouth once a week       cyanocobalamin (VITAMIN B-12) 100 MCG tablet Take 1 tablet (100 mcg) by mouth daily 30 tablet 11     donepezil (ARICEPT) 10 MG tablet Take 1 tablet (10 mg) by mouth daily 30 tablet 11     hydrochlorothiazide (HYDRODIURIL) 25 MG tablet Take 1 tablet (25 mg) by mouth every other day 15 tablet 11     Multiple Vitamins-Minerals (PRESERVISION AREDS) TABS TAKE 1 TABLET BY MOUTH ONCE DAILY 28 tablet PRN     polyethylene glycol (MIRALAX) 17 g packet Take 17 g by mouth daily 7 packet 0     potassium chloride ER (KLOR-CON M) 20 MEQ CR tablet TAKE 1 TABLET BY MOUTH ONCE DAILY 28 tablet PRN     quinapril (ACCUPRIL) 10 MG tablet TAKE 1 TABLET BY MOUTH ONCE DAILY 34 tablet PRN     traZODone (DESYREL) 50 MG tablet TAKE ONE-HALF TABLET (25MG) BY MOUTH AT BEDTIME FOR INSOMNIA 14 tablet PRN          REVIEW OF SYSTEMS:  Limited secondary to cognitive impairment. Positives as noted under HPI.     Objective:  /60   Pulse 69   Temp 97.3  F (36.3  C)   Resp 19   Wt 52.1 kg (114 lb 12.8 oz)   BMI 18.53 kg/m    Exam:  GENERAL APPEARANCE:  Alert, in no distress, thin  ENT:  [place], oropharynx clear  EYES:  EOM normal, conjunctiva and lids normal  NECK: no adenopathy,masses or thyromegaly  RESP:  lungs clear to auscultation , no respiratory distress  CV:  regular rate and rhythm, no murmur, no edema, +2 pedal pulses  ABDOMEN:  soft, non-tender, no distension, no masses  M/S:   gait steady with walker. YANCEY with good strength. No joint inflammation  SKIN:  no rashes or open areas  PSYCH:  oriented to self, place, insight and judgement impaired, memory impaired , affect and mood normal    Labs:   Recent labs in [facility] reviewed by me today.     ASSESSMENT / PLAN:  (G30.1,  F02.80) Late onset Alzheimer's disease without behavioral disturbance (H)  (primary encounter diagnosis)  Comment: progressive disease. LACLS-5 score 3.6/5.7 indicating moderate deficits.   Resistive to bathing, but no other significant behavior issues.  Daughter reports patient has not had a bath or shower in many months and we discussed ways of managing this.   Plan: Memory Care AL staff to assist with all cares, meals and med admin.  Bathing issues discussed with staff.     (S72.002D) Closed fracture of left hip with routine healing, subsequent encounter   (Z98.890,  Z87.81) S/P ORIF (open reduction internal fixation) fracture  Comment: s/p IM nailing 6/26/2021. Appears comfortable with cares and mobility. She saw Ortho 9/15/2021 and XR showed stable alignment   Plan: continue tylenol. Up with walker. Ortho follow up as scheduled.     (M80.00XD) Age-related osteoporosis with current pathological fracture with routine healing, subsequent encounter  (E55.9) Vitamin D deficiency  Comment: fracture healed.   Plan: continue fosamax, calcium,  vitamin D     (D62) Anemia due to blood loss, acute  Comment: improved with Hgb 11.8  Plan: follow Hgb prn     (I10) Essential hypertension  Comment: controlled with BPs: 117/58, 137/76  HR: 58-69   Quinapril was decreased while on tcu   Creatinine 0.72, K 3.9 on 2021  Plan: continue quinapril, HCTZ. Monitor VS and adjust doses as indicated. Follow BMP    (R53.81) Physical deconditioning  Comment: she has completed home therapies. Up ad finn with walker   Plan: encourage activity         Total time with an established patient visit in the assisted livin minutes including discussions about the POC and care coordination with the patient and family, daughter Marisol Morton. Greater than 50% of total time spent with counseling and coordinating care due to progressive dementia, resisting cares and recent hip fracture.       Electronically signed by:  CESAR Best CNP                 Sincerely,        CESAR Best CNP

## 2021-10-22 NOTE — PROGRESS NOTES
Home health certification and plan of care from [facility] signed and faxed to [phone]    Bere Rivera     Forms complete and faxed to [phone]    Bere Rivera

## 2022-01-01 ENCOUNTER — HOSPITAL ENCOUNTER (EMERGENCY)
Facility: CLINIC | Age: 87
Discharge: HOME HEALTH | End: 2022-06-21
Attending: EMERGENCY MEDICINE | Admitting: EMERGENCY MEDICINE
Payer: COMMERCIAL

## 2022-01-01 ENCOUNTER — MEDICAL CORRESPONDENCE (OUTPATIENT)
Dept: HEALTH INFORMATION MANAGEMENT | Facility: CLINIC | Age: 87
End: 2022-01-01
Payer: COMMERCIAL

## 2022-01-01 ENCOUNTER — ASSISTED LIVING VISIT (OUTPATIENT)
Dept: GERIATRICS | Facility: CLINIC | Age: 87
End: 2022-01-01
Payer: COMMERCIAL

## 2022-01-01 ENCOUNTER — LAB REQUISITION (OUTPATIENT)
Dept: LAB | Facility: CLINIC | Age: 87
End: 2022-01-01
Payer: COMMERCIAL

## 2022-01-01 ENCOUNTER — HOSPITAL ENCOUNTER (OUTPATIENT)
Facility: CLINIC | Age: 87
Setting detail: OBSERVATION
Discharge: NURSING, REHAB OR LONG-TERM CARE | End: 2022-05-31
Attending: EMERGENCY MEDICINE | Admitting: SURGERY
Payer: COMMERCIAL

## 2022-01-01 ENCOUNTER — APPOINTMENT (OUTPATIENT)
Dept: CT IMAGING | Facility: CLINIC | Age: 87
DRG: 177 | End: 2022-01-01
Attending: EMERGENCY MEDICINE
Payer: COMMERCIAL

## 2022-01-01 ENCOUNTER — HOSPITAL ENCOUNTER (INPATIENT)
Facility: CLINIC | Age: 87
LOS: 1 days | DRG: 177 | End: 2022-06-29
Attending: EMERGENCY MEDICINE | Admitting: HOSPITALIST
Payer: COMMERCIAL

## 2022-01-01 ENCOUNTER — TELEPHONE (OUTPATIENT)
Dept: GERIATRICS | Facility: CLINIC | Age: 87
End: 2022-01-01
Payer: COMMERCIAL

## 2022-01-01 ENCOUNTER — APPOINTMENT (OUTPATIENT)
Dept: GENERAL RADIOLOGY | Facility: CLINIC | Age: 87
End: 2022-01-01
Attending: EMERGENCY MEDICINE
Payer: COMMERCIAL

## 2022-01-01 ENCOUNTER — MYC MEDICAL ADVICE (OUTPATIENT)
Dept: GERIATRICS | Facility: CLINIC | Age: 87
End: 2022-01-01
Payer: COMMERCIAL

## 2022-01-01 ENCOUNTER — ASSISTED LIVING VISIT (OUTPATIENT)
Dept: GERIATRICS | Facility: CLINIC | Age: 87
End: 2022-01-01

## 2022-01-01 ENCOUNTER — APPOINTMENT (OUTPATIENT)
Dept: GENERAL RADIOLOGY | Facility: CLINIC | Age: 87
DRG: 177 | End: 2022-01-01
Attending: NURSE PRACTITIONER
Payer: COMMERCIAL

## 2022-01-01 ENCOUNTER — APPOINTMENT (OUTPATIENT)
Dept: CT IMAGING | Facility: CLINIC | Age: 87
End: 2022-01-01
Attending: EMERGENCY MEDICINE
Payer: COMMERCIAL

## 2022-01-01 ENCOUNTER — APPOINTMENT (OUTPATIENT)
Dept: CT IMAGING | Facility: CLINIC | Age: 87
DRG: 177 | End: 2022-01-01
Attending: STUDENT IN AN ORGANIZED HEALTH CARE EDUCATION/TRAINING PROGRAM
Payer: COMMERCIAL

## 2022-01-01 ENCOUNTER — APPOINTMENT (OUTPATIENT)
Dept: GENERAL RADIOLOGY | Facility: CLINIC | Age: 87
End: 2022-01-01
Payer: COMMERCIAL

## 2022-01-01 ENCOUNTER — HEALTH MAINTENANCE LETTER (OUTPATIENT)
Age: 87
End: 2022-01-01

## 2022-01-01 ENCOUNTER — APPOINTMENT (OUTPATIENT)
Dept: GENERAL RADIOLOGY | Facility: CLINIC | Age: 87
DRG: 177 | End: 2022-01-01
Attending: EMERGENCY MEDICINE
Payer: COMMERCIAL

## 2022-01-01 ENCOUNTER — APPOINTMENT (OUTPATIENT)
Dept: GENERAL RADIOLOGY | Facility: CLINIC | Age: 87
DRG: 177 | End: 2022-01-01
Attending: STUDENT IN AN ORGANIZED HEALTH CARE EDUCATION/TRAINING PROGRAM
Payer: COMMERCIAL

## 2022-01-01 VITALS
HEART RATE: 89 BPM | WEIGHT: 131 LBS | SYSTOLIC BLOOD PRESSURE: 147 MMHG | RESPIRATION RATE: 16 BRPM | BODY MASS INDEX: 21.14 KG/M2 | DIASTOLIC BLOOD PRESSURE: 76 MMHG | TEMPERATURE: 97.5 F

## 2022-01-01 VITALS
WEIGHT: 123.68 LBS | RESPIRATION RATE: 18 BRPM | BODY MASS INDEX: 19.96 KG/M2 | HEART RATE: 67 BPM | SYSTOLIC BLOOD PRESSURE: 166 MMHG | OXYGEN SATURATION: 97 % | DIASTOLIC BLOOD PRESSURE: 79 MMHG | TEMPERATURE: 97.9 F

## 2022-01-01 VITALS
RESPIRATION RATE: 18 BRPM | HEART RATE: 72 BPM | SYSTOLIC BLOOD PRESSURE: 122 MMHG | DIASTOLIC BLOOD PRESSURE: 80 MMHG | BODY MASS INDEX: 23.16 KG/M2 | WEIGHT: 143.5 LBS | TEMPERATURE: 96.9 F

## 2022-01-01 VITALS — TEMPERATURE: 97.3 F | BODY MASS INDEX: 19 KG/M2 | HEART RATE: 80 BPM | WEIGHT: 117.7 LBS

## 2022-01-01 VITALS
BODY MASS INDEX: 19 KG/M2 | HEART RATE: 80 BPM | TEMPERATURE: 97.2 F | RESPIRATION RATE: 19 BRPM | DIASTOLIC BLOOD PRESSURE: 88 MMHG | SYSTOLIC BLOOD PRESSURE: 135 MMHG | WEIGHT: 117.7 LBS

## 2022-01-01 VITALS
HEART RATE: 76 BPM | WEIGHT: 112.1 LBS | RESPIRATION RATE: 16 BRPM | DIASTOLIC BLOOD PRESSURE: 46 MMHG | SYSTOLIC BLOOD PRESSURE: 91 MMHG | HEIGHT: 66 IN | BODY MASS INDEX: 18.02 KG/M2 | OXYGEN SATURATION: 100 % | TEMPERATURE: 97 F

## 2022-01-01 VITALS
SYSTOLIC BLOOD PRESSURE: 137 MMHG | BODY MASS INDEX: 19 KG/M2 | DIASTOLIC BLOOD PRESSURE: 77 MMHG | RESPIRATION RATE: 16 BRPM | HEART RATE: 69 BPM | WEIGHT: 117.7 LBS | TEMPERATURE: 97.1 F

## 2022-01-01 VITALS
SYSTOLIC BLOOD PRESSURE: 135 MMHG | RESPIRATION RATE: 19 BRPM | WEIGHT: 117.7 LBS | HEART RATE: 80 BPM | DIASTOLIC BLOOD PRESSURE: 88 MMHG | TEMPERATURE: 97.1 F | BODY MASS INDEX: 19 KG/M2

## 2022-01-01 VITALS
OXYGEN SATURATION: 98 % | TEMPERATURE: 97.9 F | HEART RATE: 70 BPM | RESPIRATION RATE: 16 BRPM | SYSTOLIC BLOOD PRESSURE: 160 MMHG | DIASTOLIC BLOOD PRESSURE: 91 MMHG

## 2022-01-01 VITALS
HEART RATE: 70 BPM | TEMPERATURE: 98.8 F | DIASTOLIC BLOOD PRESSURE: 59 MMHG | SYSTOLIC BLOOD PRESSURE: 101 MMHG | WEIGHT: 120 LBS | BODY MASS INDEX: 19.37 KG/M2 | RESPIRATION RATE: 18 BRPM

## 2022-01-01 VITALS
RESPIRATION RATE: 16 BRPM | SYSTOLIC BLOOD PRESSURE: 138 MMHG | BODY MASS INDEX: 21.14 KG/M2 | HEART RATE: 78 BPM | TEMPERATURE: 98.7 F | WEIGHT: 131 LBS | DIASTOLIC BLOOD PRESSURE: 73 MMHG

## 2022-01-01 VITALS
SYSTOLIC BLOOD PRESSURE: 101 MMHG | HEART RATE: 70 BPM | TEMPERATURE: 97.1 F | DIASTOLIC BLOOD PRESSURE: 59 MMHG | BODY MASS INDEX: 19.37 KG/M2 | RESPIRATION RATE: 18 BRPM | WEIGHT: 120 LBS

## 2022-01-01 VITALS
SYSTOLIC BLOOD PRESSURE: 113 MMHG | WEIGHT: 120 LBS | DIASTOLIC BLOOD PRESSURE: 58 MMHG | TEMPERATURE: 98 F | HEART RATE: 77 BPM | BODY MASS INDEX: 19.37 KG/M2 | RESPIRATION RATE: 18 BRPM

## 2022-01-01 VITALS
SYSTOLIC BLOOD PRESSURE: 176 MMHG | OXYGEN SATURATION: 97 % | HEART RATE: 62 BPM | WEIGHT: 131 LBS | HEIGHT: 66 IN | TEMPERATURE: 97.1 F | RESPIRATION RATE: 18 BRPM | DIASTOLIC BLOOD PRESSURE: 82 MMHG | BODY MASS INDEX: 21.05 KG/M2

## 2022-01-01 VITALS
RESPIRATION RATE: 20 BRPM | SYSTOLIC BLOOD PRESSURE: 116 MMHG | DIASTOLIC BLOOD PRESSURE: 73 MMHG | BODY MASS INDEX: 21.14 KG/M2 | TEMPERATURE: 98.1 F | HEART RATE: 94 BPM | WEIGHT: 131 LBS

## 2022-01-01 DIAGNOSIS — G30.1 LATE ONSET ALZHEIMER'S DEMENTIA WITH BEHAVIORAL DISTURBANCE (H): Primary | ICD-10-CM

## 2022-01-01 DIAGNOSIS — F02.818 LATE ONSET ALZHEIMER'S DEMENTIA WITH BEHAVIORAL DISTURBANCE (H): ICD-10-CM

## 2022-01-01 DIAGNOSIS — M81.0 AGE-RELATED OSTEOPOROSIS WITHOUT CURRENT PATHOLOGICAL FRACTURE: ICD-10-CM

## 2022-01-01 DIAGNOSIS — E55.9 VITAMIN D DEFICIENCY: ICD-10-CM

## 2022-01-01 DIAGNOSIS — I10 ESSENTIAL HYPERTENSION: ICD-10-CM

## 2022-01-01 DIAGNOSIS — S12.601D CLOSED NONDISPLACED FRACTURE OF SEVENTH CERVICAL VERTEBRA WITH ROUTINE HEALING, UNSPECIFIED FRACTURE MORPHOLOGY, SUBSEQUENT ENCOUNTER: ICD-10-CM

## 2022-01-01 DIAGNOSIS — S09.90XD CLOSED HEAD INJURY, SUBSEQUENT ENCOUNTER: ICD-10-CM

## 2022-01-01 DIAGNOSIS — Z98.890 S/P ORIF (OPEN REDUCTION INTERNAL FIXATION) FRACTURE: ICD-10-CM

## 2022-01-01 DIAGNOSIS — G30.1 LATE ONSET ALZHEIMER'S DEMENTIA WITH BEHAVIORAL DISTURBANCE (H): ICD-10-CM

## 2022-01-01 DIAGNOSIS — D62 ANEMIA DUE TO BLOOD LOSS, ACUTE: ICD-10-CM

## 2022-01-01 DIAGNOSIS — F03.92 DEMENTIA WITH PSYCHOSIS (H): ICD-10-CM

## 2022-01-01 DIAGNOSIS — H10.9 CONJUNCTIVITIS OF BOTH EYES, UNSPECIFIED CONJUNCTIVITIS TYPE: ICD-10-CM

## 2022-01-01 DIAGNOSIS — S72.002D CLOSED FRACTURE OF LEFT HIP WITH ROUTINE HEALING, SUBSEQUENT ENCOUNTER: ICD-10-CM

## 2022-01-01 DIAGNOSIS — K59.01 SLOW TRANSIT CONSTIPATION: ICD-10-CM
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
## 2022-01-01 DIAGNOSIS — R53.81 PHYSICAL DECONDITIONING: ICD-10-CM

## 2022-01-01 DIAGNOSIS — U07.1 COVID-19: ICD-10-CM

## 2022-01-01 DIAGNOSIS — G30.1 LATE ONSET ALZHEIMER'S DISEASE WITHOUT BEHAVIORAL DISTURBANCE (H): ICD-10-CM

## 2022-01-01 DIAGNOSIS — R71.8 OTHER ABNORMALITY OF RED BLOOD CELLS: ICD-10-CM

## 2022-01-01 DIAGNOSIS — M80.00XD AGE-RELATED OSTEOPOROSIS WITH CURRENT PATHOLOGICAL FRACTURE WITH ROUTINE HEALING, SUBSEQUENT ENCOUNTER: ICD-10-CM

## 2022-01-01 DIAGNOSIS — H10.9 CONJUNCTIVITIS OF BOTH EYES, UNSPECIFIED CONJUNCTIVITIS TYPE: Primary | ICD-10-CM

## 2022-01-01 DIAGNOSIS — M25.551 HIP PAIN, RIGHT: ICD-10-CM

## 2022-01-01 DIAGNOSIS — F02.818 LATE ONSET ALZHEIMER'S DEMENTIA WITH BEHAVIORAL DISTURBANCE (H): Primary | ICD-10-CM

## 2022-01-01 DIAGNOSIS — H04.123 DRY EYES: Primary | ICD-10-CM

## 2022-01-01 DIAGNOSIS — U07.1 INFECTION DUE TO 2019 NOVEL CORONAVIRUS: ICD-10-CM

## 2022-01-01 DIAGNOSIS — F03.92 DEMENTIA WITH PSYCHOSIS (H): Primary | ICD-10-CM

## 2022-01-01 DIAGNOSIS — N30.00 ACUTE CYSTITIS WITHOUT HEMATURIA: ICD-10-CM

## 2022-01-01 DIAGNOSIS — R11.2 NAUSEA AND VOMITING, INTRACTABILITY OF VOMITING NOT SPECIFIED, UNSPECIFIED VOMITING TYPE: ICD-10-CM

## 2022-01-01 DIAGNOSIS — J96.01 ACUTE RESPIRATORY FAILURE WITH HYPOXIA (H): ICD-10-CM

## 2022-01-01 DIAGNOSIS — I45.6 WOLFF-PARKINSON-WHITE (WPW) PATTERN: ICD-10-CM
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
## 2022-01-01 DIAGNOSIS — R29.6 RECURRENT FALLS: ICD-10-CM

## 2022-01-01 DIAGNOSIS — S12.601A CLOSED NONDISPLACED FRACTURE OF SEVENTH CERVICAL VERTEBRA, UNSPECIFIED FRACTURE MORPHOLOGY, INITIAL ENCOUNTER (H): ICD-10-CM

## 2022-01-01 DIAGNOSIS — R53.1 WEAKNESS: ICD-10-CM

## 2022-01-01 DIAGNOSIS — Z87.81 S/P ORIF (OPEN REDUCTION INTERNAL FIXATION) FRACTURE: ICD-10-CM

## 2022-01-01 DIAGNOSIS — F02.80 LATE ONSET ALZHEIMER'S DISEASE WITHOUT BEHAVIORAL DISTURBANCE (H): ICD-10-CM

## 2022-01-01 DIAGNOSIS — G30.1 LATE ONSET ALZHEIMER'S DISEASE WITHOUT BEHAVIORAL DISTURBANCE (H): Primary | ICD-10-CM

## 2022-01-01 DIAGNOSIS — D64.9 ANEMIA, UNSPECIFIED: ICD-10-CM

## 2022-01-01 DIAGNOSIS — F02.80 LATE ONSET ALZHEIMER'S DISEASE WITHOUT BEHAVIORAL DISTURBANCE (H): Primary | ICD-10-CM

## 2022-01-01 DIAGNOSIS — U07.1 INFECTION DUE TO 2019 NOVEL CORONAVIRUS: Primary | ICD-10-CM

## 2022-01-01 DIAGNOSIS — S72.002A CLOSED FRACTURE OF LEFT HIP, INITIAL ENCOUNTER (H): Primary | ICD-10-CM

## 2022-01-01 DIAGNOSIS — S72.002A CLOSED FRACTURE OF LEFT HIP, INITIAL ENCOUNTER (H): ICD-10-CM

## 2022-01-01 DIAGNOSIS — S70.01XD HEMATOMA OF RIGHT HIP, SUBSEQUENT ENCOUNTER: ICD-10-CM

## 2022-01-01 DIAGNOSIS — N39.0 URINARY TRACT INFECTION WITHOUT HEMATURIA, SITE UNSPECIFIED: ICD-10-CM

## 2022-01-01 DIAGNOSIS — S09.90XA CLOSED HEAD INJURY, INITIAL ENCOUNTER: ICD-10-CM

## 2022-01-01 DIAGNOSIS — S12.601D CLOSED NONDISPLACED FRACTURE OF SEVENTH CERVICAL VERTEBRA WITH ROUTINE HEALING, UNSPECIFIED FRACTURE MORPHOLOGY, SUBSEQUENT ENCOUNTER: Primary | ICD-10-CM

## 2022-01-01 DIAGNOSIS — I10 ESSENTIAL (PRIMARY) HYPERTENSION: ICD-10-CM

## 2022-01-01 LAB
ALBUMIN SERPL BCG-MCNC: 3.6 G/DL (ref 3.5–5.2)
ALBUMIN SERPL BCG-MCNC: 3.6 G/DL (ref 3.5–5.2)
ALBUMIN SERPL-MCNC: 2.6 G/DL (ref 3.4–5)
ALBUMIN SERPL-MCNC: 2.8 G/DL (ref 3.4–5)
ALBUMIN UR-MCNC: 30 MG/DL
ALBUMIN UR-MCNC: 30 MG/DL
ALBUMIN UR-MCNC: NEGATIVE MG/DL
ALP SERPL-CCNC: 127 U/L (ref 40–150)
ALP SERPL-CCNC: 129 U/L (ref 35–104)
ALP SERPL-CCNC: 134 U/L (ref 35–104)
ALP SERPL-CCNC: 85 U/L (ref 40–150)
ALT SERPL W P-5'-P-CCNC: 14 U/L (ref 0–50)
ALT SERPL W P-5'-P-CCNC: 24 U/L (ref 0–50)
ALT SERPL W P-5'-P-CCNC: 6 U/L (ref 10–35)
ALT SERPL W P-5'-P-CCNC: 8 U/L (ref 10–35)
AMORPH CRY #/AREA URNS HPF: ABNORMAL /HPF
ANION GAP SERPL CALCULATED.3IONS-SCNC: 10 MMOL/L (ref 3–14)
ANION GAP SERPL CALCULATED.3IONS-SCNC: 10 MMOL/L (ref 7–15)
ANION GAP SERPL CALCULATED.3IONS-SCNC: 4 MMOL/L (ref 3–14)
ANION GAP SERPL CALCULATED.3IONS-SCNC: 5 MMOL/L (ref 3–14)
ANION GAP SERPL CALCULATED.3IONS-SCNC: 7 MMOL/L (ref 3–14)
ANION GAP SERPL CALCULATED.3IONS-SCNC: 7 MMOL/L (ref 7–15)
ANION GAP SERPL CALCULATED.3IONS-SCNC: 8 MMOL/L (ref 7–15)
ANION GAP SERPL CALCULATED.3IONS-SCNC: 9 MMOL/L (ref 5–18)
ANION GAP SERPL CALCULATED.3IONS-SCNC: 9 MMOL/L (ref 5–18)
APPEARANCE UR: ABNORMAL
APPEARANCE UR: ABNORMAL
APPEARANCE UR: CLEAR
AST SERPL W P-5'-P-CCNC: 15 U/L (ref 0–45)
AST SERPL W P-5'-P-CCNC: 19 U/L (ref 10–35)
AST SERPL W P-5'-P-CCNC: 21 U/L (ref 10–35)
AST SERPL W P-5'-P-CCNC: 28 U/L (ref 0–45)
ATRIAL RATE - MUSE: 108 BPM
ATRIAL RATE - MUSE: 71 BPM
ATRIAL RATE - MUSE: 92 BPM
BACTERIA #/AREA URNS HPF: ABNORMAL /HPF
BACTERIA #/AREA URNS HPF: ABNORMAL /HPF
BACTERIA UR CULT: ABNORMAL
BASE EXCESS BLDV CALC-SCNC: -0.1 MMOL/L (ref -7.7–1.9)
BASE EXCESS BLDV CALC-SCNC: 4.1 MMOL/L (ref -7.7–1.9)
BASE EXCESS BLDV CALC-SCNC: 5.8 MMOL/L (ref -7.7–1.9)
BASE EXCESS BLDV CALC-SCNC: ABNORMAL MMOL/L
BASOPHILS # BLD AUTO: 0 10E3/UL (ref 0–0.2)
BASOPHILS # BLD AUTO: 0.1 10E3/UL (ref 0–0.2)
BASOPHILS NFR BLD AUTO: 0 %
BASOPHILS NFR BLD AUTO: 1 %
BILIRUB SERPL-MCNC: 0.3 MG/DL (ref 0.2–1.3)
BILIRUB SERPL-MCNC: 0.5 MG/DL
BILIRUB SERPL-MCNC: 0.6 MG/DL
BILIRUB SERPL-MCNC: 0.6 MG/DL (ref 0.2–1.3)
BILIRUB UR QL STRIP: NEGATIVE
BUN SERPL-MCNC: 14 MG/DL (ref 7–30)
BUN SERPL-MCNC: 17 MG/DL (ref 7–30)
BUN SERPL-MCNC: 19.3 MG/DL (ref 8–23)
BUN SERPL-MCNC: 20 MG/DL (ref 7–30)
BUN SERPL-MCNC: 21.2 MG/DL (ref 8–23)
BUN SERPL-MCNC: 21.4 MG/DL (ref 8–23)
BUN SERPL-MCNC: 24 MG/DL (ref 8–28)
BUN SERPL-MCNC: 25 MG/DL (ref 7–30)
BUN SERPL-MCNC: 25 MG/DL (ref 8–28)
BUN SERPL-MCNC: 29 MG/DL (ref 7–30)
BUN SERPL-MCNC: 31 MG/DL (ref 7–30)
BURR CELLS BLD QL SMEAR: ABNORMAL
C COLI+JEJUNI+LARI FUSA STL QL NAA+PROBE: NOT DETECTED
CALCIUM SERPL-MCNC: 10.4 MG/DL (ref 8.5–10.5)
CALCIUM SERPL-MCNC: 8.4 MG/DL (ref 8.5–10.1)
CALCIUM SERPL-MCNC: 8.4 MG/DL (ref 8.5–10.1)
CALCIUM SERPL-MCNC: 8.5 MG/DL (ref 8.5–10.1)
CALCIUM SERPL-MCNC: 8.5 MG/DL (ref 8.5–10.1)
CALCIUM SERPL-MCNC: 8.6 MG/DL (ref 8.8–10.2)
CALCIUM SERPL-MCNC: 8.7 MG/DL (ref 8.5–10.1)
CALCIUM SERPL-MCNC: 8.7 MG/DL (ref 8.5–10.1)
CALCIUM SERPL-MCNC: 8.7 MG/DL (ref 8.8–10.2)
CALCIUM SERPL-MCNC: 9.3 MG/DL (ref 8.8–10.2)
CALCIUM SERPL-MCNC: 9.8 MG/DL (ref 8.5–10.5)
CHLORIDE BLD-SCNC: 100 MMOL/L (ref 98–107)
CHLORIDE BLD-SCNC: 101 MMOL/L (ref 94–109)
CHLORIDE BLD-SCNC: 102 MMOL/L (ref 94–109)
CHLORIDE BLD-SCNC: 104 MMOL/L (ref 94–109)
CHLORIDE BLD-SCNC: 105 MMOL/L (ref 94–109)
CHLORIDE BLD-SCNC: 98 MMOL/L (ref 98–107)
CHLORIDE SERPL-SCNC: 101 MMOL/L (ref 98–107)
CHLORIDE SERPL-SCNC: 97 MMOL/L (ref 98–107)
CHLORIDE SERPL-SCNC: 99 MMOL/L (ref 98–107)
CO2 SERPL-SCNC: 26 MMOL/L (ref 20–32)
CO2 SERPL-SCNC: 28 MMOL/L (ref 20–32)
CO2 SERPL-SCNC: 28 MMOL/L (ref 22–31)
CO2 SERPL-SCNC: 29 MMOL/L (ref 20–32)
CO2 SERPL-SCNC: 30 MMOL/L (ref 22–31)
CO2 SERPL-SCNC: 31 MMOL/L (ref 20–32)
COLOR UR AUTO: YELLOW
CREAT SERPL-MCNC: 0.52 MG/DL (ref 0.52–1.04)
CREAT SERPL-MCNC: 0.57 MG/DL (ref 0.51–0.95)
CREAT SERPL-MCNC: 0.57 MG/DL (ref 0.51–0.95)
CREAT SERPL-MCNC: 0.61 MG/DL (ref 0.51–0.95)
CREAT SERPL-MCNC: 0.61 MG/DL (ref 0.52–1.04)
CREAT SERPL-MCNC: 0.62 MG/DL (ref 0.52–1.04)
CREAT SERPL-MCNC: 0.66 MG/DL (ref 0.6–1.1)
CREAT SERPL-MCNC: 0.68 MG/DL (ref 0.52–1.04)
CREAT SERPL-MCNC: 0.68 MG/DL (ref 0.52–1.04)
CREAT SERPL-MCNC: 0.7 MG/DL (ref 0.52–1.04)
CREAT SERPL-MCNC: 0.73 MG/DL (ref 0.6–1.1)
CRP SERPL-MCNC: 130 MG/L (ref 0–8)
DEPRECATED HCO3 PLAS-SCNC: 29 MMOL/L (ref 22–29)
DEPRECATED HCO3 PLAS-SCNC: 29 MMOL/L (ref 22–29)
DEPRECATED HCO3 PLAS-SCNC: 34 MMOL/L (ref 22–29)
DIASTOLIC BLOOD PRESSURE - MUSE: NORMAL MMHG
EC STX1 GENE STL QL NAA+PROBE: NOT DETECTED
EC STX2 GENE STL QL NAA+PROBE: NOT DETECTED
EOSINOPHIL # BLD AUTO: 0 10E3/UL (ref 0–0.7)
EOSINOPHIL # BLD AUTO: 0.1 10E3/UL (ref 0–0.7)
EOSINOPHIL # BLD AUTO: 0.2 10E3/UL (ref 0–0.7)
EOSINOPHIL # BLD AUTO: 0.5 10E3/UL (ref 0–0.7)
EOSINOPHIL NFR BLD AUTO: 0 %
EOSINOPHIL NFR BLD AUTO: 1 %
EOSINOPHIL NFR BLD AUTO: 3 %
EOSINOPHIL NFR BLD AUTO: 4 %
ERYTHROCYTE [DISTWIDTH] IN BLOOD BY AUTOMATED COUNT: 14.4 % (ref 10–15)
ERYTHROCYTE [DISTWIDTH] IN BLOOD BY AUTOMATED COUNT: 14.5 % (ref 10–15)
ERYTHROCYTE [DISTWIDTH] IN BLOOD BY AUTOMATED COUNT: 14.5 % (ref 10–15)
ERYTHROCYTE [DISTWIDTH] IN BLOOD BY AUTOMATED COUNT: 14.6 % (ref 10–15)
ERYTHROCYTE [DISTWIDTH] IN BLOOD BY AUTOMATED COUNT: 14.6 % (ref 10–15)
ERYTHROCYTE [DISTWIDTH] IN BLOOD BY AUTOMATED COUNT: 16.1 % (ref 10–15)
ERYTHROCYTE [DISTWIDTH] IN BLOOD BY AUTOMATED COUNT: 16.2 % (ref 10–15)
ERYTHROCYTE [DISTWIDTH] IN BLOOD BY AUTOMATED COUNT: 16.8 % (ref 10–15)
ERYTHROCYTE [DISTWIDTH] IN BLOOD BY AUTOMATED COUNT: 17 % (ref 10–15)
ERYTHROCYTE [DISTWIDTH] IN BLOOD BY AUTOMATED COUNT: 17.2 % (ref 10–15)
GFR SERPL CREATININE-BSD FRML MDRD: 79 ML/MIN/1.73M2
GFR SERPL CREATININE-BSD FRML MDRD: 83 ML/MIN/1.73M2
GFR SERPL CREATININE-BSD FRML MDRD: 84 ML/MIN/1.73M2
GFR SERPL CREATININE-BSD FRML MDRD: 86 ML/MIN/1.73M2
GFR SERPL CREATININE-BSD FRML MDRD: 87 ML/MIN/1.73M2
GFR SERPL CREATININE-BSD FRML MDRD: 87 ML/MIN/1.73M2
GFR SERPL CREATININE-BSD FRML MDRD: 89 ML/MIN/1.73M2
GLUCOSE BLD-MCNC: 107 MG/DL (ref 70–99)
GLUCOSE BLD-MCNC: 112 MG/DL (ref 70–125)
GLUCOSE BLD-MCNC: 61 MG/DL (ref 70–99)
GLUCOSE BLD-MCNC: 78 MG/DL (ref 70–99)
GLUCOSE BLD-MCNC: 79 MG/DL (ref 70–99)
GLUCOSE BLD-MCNC: 79 MG/DL (ref 70–99)
GLUCOSE BLD-MCNC: 86 MG/DL (ref 70–125)
GLUCOSE BLD-MCNC: 87 MG/DL (ref 70–99)
GLUCOSE BLDC GLUCOMTR-MCNC: 108 MG/DL (ref 70–99)
GLUCOSE BLDC GLUCOMTR-MCNC: 125 MG/DL (ref 70–99)
GLUCOSE BLDC GLUCOMTR-MCNC: 140 MG/DL (ref 70–99)
GLUCOSE BLDC GLUCOMTR-MCNC: 191 MG/DL (ref 70–99)
GLUCOSE BLDC GLUCOMTR-MCNC: 78 MG/DL (ref 70–99)
GLUCOSE BLDC GLUCOMTR-MCNC: 94 MG/DL (ref 70–99)
GLUCOSE SERPL-MCNC: 121 MG/DL (ref 70–99)
GLUCOSE SERPL-MCNC: 144 MG/DL (ref 70–99)
GLUCOSE SERPL-MCNC: 85 MG/DL (ref 70–99)
GLUCOSE UR STRIP-MCNC: NEGATIVE MG/DL
HCO3 BLDV-SCNC: 32 MMOL/L (ref 21–28)
HCO3 BLDV-SCNC: 34 MMOL/L (ref 21–28)
HCO3 BLDV-SCNC: 37 MMOL/L (ref 21–28)
HCO3 BLDV-SCNC: ABNORMAL MMOL/L
HCT VFR BLD AUTO: 29 % (ref 35–47)
HCT VFR BLD AUTO: 30 % (ref 35–47)
HCT VFR BLD AUTO: 30.8 % (ref 35–47)
HCT VFR BLD AUTO: 31.1 % (ref 35–47)
HCT VFR BLD AUTO: 33.2 % (ref 35–47)
HCT VFR BLD AUTO: 34.5 % (ref 35–47)
HCT VFR BLD AUTO: 35.5 % (ref 35–47)
HCT VFR BLD AUTO: 36.2 % (ref 35–47)
HCT VFR BLD AUTO: 36.9 % (ref 35–47)
HCT VFR BLD AUTO: 39.3 % (ref 35–47)
HGB BLD-MCNC: 10.5 G/DL (ref 11.7–15.7)
HGB BLD-MCNC: 11.3 G/DL (ref 11.7–15.7)
HGB BLD-MCNC: 11.4 G/DL (ref 11.7–15.7)
HGB BLD-MCNC: 11.5 G/DL (ref 11.7–15.7)
HGB BLD-MCNC: 11.7 G/DL (ref 11.7–15.7)
HGB BLD-MCNC: 12.6 G/DL (ref 11.7–15.7)
HGB BLD-MCNC: 12.6 G/DL (ref 11.7–15.7)
HGB BLD-MCNC: 9.5 G/DL (ref 11.7–15.7)
HGB BLD-MCNC: 9.6 G/DL (ref 11.7–15.7)
HGB BLD-MCNC: 9.6 G/DL (ref 11.7–15.7)
HGB BLD-MCNC: 9.7 G/DL (ref 11.7–15.7)
HGB UR QL STRIP: NEGATIVE
HOLD SPECIMEN: NORMAL
HYALINE CASTS: 3 /LPF
IMM GRANULOCYTES # BLD: 0.1 10E3/UL
IMM GRANULOCYTES # BLD: 0.3 10E3/UL
IMM GRANULOCYTES NFR BLD: 1 %
IMM GRANULOCYTES NFR BLD: 2 %
INTERPRETATION ECG - MUSE: NORMAL
KETONES UR STRIP-MCNC: 10 MG/DL
KETONES UR STRIP-MCNC: 60 MG/DL
KETONES UR STRIP-MCNC: ABNORMAL MG/DL
LACTATE SERPL-SCNC: 1.3 MMOL/L (ref 0.7–2)
LEUKOCYTE ESTERASE UR QL STRIP: ABNORMAL
LEUKOCYTE ESTERASE UR QL STRIP: ABNORMAL
LEUKOCYTE ESTERASE UR QL STRIP: NEGATIVE
LIPASE SERPL-CCNC: 15 U/L (ref 13–60)
LYMPHOCYTES # BLD AUTO: 0.3 10E3/UL (ref 0.8–5.3)
LYMPHOCYTES # BLD AUTO: 0.5 10E3/UL (ref 0.8–5.3)
LYMPHOCYTES # BLD AUTO: 0.8 10E3/UL (ref 0.8–5.3)
LYMPHOCYTES # BLD AUTO: 1.1 10E3/UL (ref 0.8–5.3)
LYMPHOCYTES NFR BLD AUTO: 2 %
LYMPHOCYTES NFR BLD AUTO: 3 %
LYMPHOCYTES NFR BLD AUTO: 9 %
LYMPHOCYTES NFR BLD AUTO: 9 %
MCH RBC QN AUTO: 29.2 PG (ref 26.5–33)
MCH RBC QN AUTO: 29.3 PG (ref 26.5–33)
MCH RBC QN AUTO: 29.5 PG (ref 26.5–33)
MCH RBC QN AUTO: 29.7 PG (ref 26.5–33)
MCH RBC QN AUTO: 29.8 PG (ref 26.5–33)
MCH RBC QN AUTO: 29.9 PG (ref 26.5–33)
MCH RBC QN AUTO: 30.1 PG (ref 26.5–33)
MCH RBC QN AUTO: 30.3 PG (ref 26.5–33)
MCH RBC QN AUTO: 30.4 PG (ref 26.5–33)
MCH RBC QN AUTO: 30.5 PG (ref 26.5–33)
MCHC RBC AUTO-ENTMCNC: 30.9 G/DL (ref 31.5–36.5)
MCHC RBC AUTO-ENTMCNC: 31.5 G/DL (ref 31.5–36.5)
MCHC RBC AUTO-ENTMCNC: 31.6 G/DL (ref 31.5–36.5)
MCHC RBC AUTO-ENTMCNC: 31.7 G/DL (ref 31.5–36.5)
MCHC RBC AUTO-ENTMCNC: 31.8 G/DL (ref 31.5–36.5)
MCHC RBC AUTO-ENTMCNC: 32 G/DL (ref 31.5–36.5)
MCHC RBC AUTO-ENTMCNC: 32.1 G/DL (ref 31.5–36.5)
MCHC RBC AUTO-ENTMCNC: 32.1 G/DL (ref 31.5–36.5)
MCHC RBC AUTO-ENTMCNC: 32.8 G/DL (ref 31.5–36.5)
MCHC RBC AUTO-ENTMCNC: 32.8 G/DL (ref 31.5–36.5)
MCV RBC AUTO: 92 FL (ref 78–100)
MCV RBC AUTO: 92 FL (ref 78–100)
MCV RBC AUTO: 93 FL (ref 78–100)
MCV RBC AUTO: 96 FL (ref 78–100)
MCV RBC AUTO: 99 FL (ref 78–100)
MONOCYTES # BLD AUTO: 0.8 10E3/UL (ref 0–1.3)
MONOCYTES # BLD AUTO: 1 10E3/UL (ref 0–1.3)
MONOCYTES # BLD AUTO: 1 10E3/UL (ref 0–1.3)
MONOCYTES # BLD AUTO: 1.9 10E3/UL (ref 0–1.3)
MONOCYTES NFR BLD AUTO: 10 %
MONOCYTES NFR BLD AUTO: 11 %
MONOCYTES NFR BLD AUTO: 7 %
MONOCYTES NFR BLD AUTO: 9 %
MUCOUS THREADS #/AREA URNS LPF: PRESENT /LPF
MUCOUS THREADS #/AREA URNS LPF: PRESENT /LPF
NEUTROPHILS # BLD AUTO: 11.2 10E3/UL (ref 1.6–8.3)
NEUTROPHILS # BLD AUTO: 15.4 10E3/UL (ref 1.6–8.3)
NEUTROPHILS # BLD AUTO: 6.8 10E3/UL (ref 1.6–8.3)
NEUTROPHILS # BLD AUTO: 8.9 10E3/UL (ref 1.6–8.3)
NEUTROPHILS NFR BLD AUTO: 75 %
NEUTROPHILS NFR BLD AUTO: 76 %
NEUTROPHILS NFR BLD AUTO: 83 %
NEUTROPHILS NFR BLD AUTO: 90 %
NITRATE UR QL: NEGATIVE
NITRATE UR QL: NEGATIVE
NITRATE UR QL: POSITIVE
NOROV GI+II ORF1-ORF2 JNC STL QL NAA+PR: NOT DETECTED
NRBC # BLD AUTO: 0 10E3/UL
NRBC BLD AUTO-RTO: 0 /100
O2/TOTAL GAS SETTING VFR VENT: 100 %
O2/TOTAL GAS SETTING VFR VENT: 60 %
O2/TOTAL GAS SETTING VFR VENT: 60 %
O2/TOTAL GAS SETTING VFR VENT: 80 %
P AXIS - MUSE: 23 DEGREES
P AXIS - MUSE: 39 DEGREES
P AXIS - MUSE: 45 DEGREES
PCO2 BLDV: 111 MM HG (ref 40–50)
PCO2 BLDV: 122 MM HG (ref 40–50)
PCO2 BLDV: 67 MM HG (ref 40–50)
PCO2 BLDV: >130 MM HG (ref 40–50)
PH BLDV: 7.06 [PH] (ref 7.32–7.43)
PH BLDV: 7.07 [PH] (ref 7.32–7.43)
PH BLDV: 7.09 [PH] (ref 7.32–7.43)
PH BLDV: 7.31 [PH] (ref 7.32–7.43)
PH UR STRIP: 5.5 [PH] (ref 5–7)
PH UR STRIP: 5.5 [PH] (ref 5–7)
PH UR STRIP: 6.5 [PH] (ref 5–7)
PLAT MORPH BLD: ABNORMAL
PLATELET # BLD AUTO: 1053 10E3/UL (ref 150–450)
PLATELET # BLD AUTO: 1072 10E3/UL (ref 150–450)
PLATELET # BLD AUTO: 1088 10E3/UL (ref 150–450)
PLATELET # BLD AUTO: 1126 10E3/UL (ref 150–450)
PLATELET # BLD AUTO: 1159 10E3/UL (ref 150–450)
PLATELET # BLD AUTO: 1182 10E3/UL (ref 150–450)
PLATELET # BLD AUTO: 852 10E3/UL (ref 150–450)
PLATELET # BLD AUTO: 865 10E3/UL (ref 150–450)
PLATELET # BLD AUTO: 883 10E3/UL (ref 150–450)
PLATELET # BLD AUTO: 892 10E3/UL (ref 150–450)
PO2 BLDV: 100 MM HG (ref 25–47)
PO2 BLDV: 18 MM HG (ref 25–47)
PO2 BLDV: 59 MM HG (ref 25–47)
PO2 BLDV: 89 MM HG (ref 25–47)
POTASSIUM BLD-SCNC: 3 MMOL/L (ref 3.4–5.3)
POTASSIUM BLD-SCNC: 3.3 MMOL/L (ref 3.4–5.3)
POTASSIUM BLD-SCNC: 3.7 MMOL/L (ref 3.4–5.3)
POTASSIUM BLD-SCNC: 3.8 MMOL/L (ref 3.4–5.3)
POTASSIUM BLD-SCNC: 4.1 MMOL/L (ref 3.5–5)
POTASSIUM BLD-SCNC: 4.2 MMOL/L (ref 3.4–5.3)
POTASSIUM BLD-SCNC: 4.2 MMOL/L (ref 3.5–5)
POTASSIUM BLD-SCNC: 4.3 MMOL/L (ref 3.4–5.3)
POTASSIUM SERPL-SCNC: 4 MMOL/L (ref 3.4–5.3)
POTASSIUM SERPL-SCNC: 4.1 MMOL/L (ref 3.4–5.3)
POTASSIUM SERPL-SCNC: 4.6 MMOL/L (ref 3.4–5.3)
PR INTERVAL - MUSE: 134 MS
PR INTERVAL - MUSE: 158 MS
PR INTERVAL - MUSE: 170 MS
PROCALCITONIN SERPL IA-MCNC: 0.13 NG/ML
PROT SERPL-MCNC: 6 G/DL (ref 6.8–8.8)
PROT SERPL-MCNC: 6.1 G/DL (ref 6.8–8.8)
PROT SERPL-MCNC: 6.2 G/DL (ref 6.4–8.3)
PROT SERPL-MCNC: 6.2 G/DL (ref 6.4–8.3)
QRS DURATION - MUSE: 102 MS
QRS DURATION - MUSE: 84 MS
QRS DURATION - MUSE: 98 MS
QT - MUSE: 344 MS
QT - MUSE: 374 MS
QT - MUSE: 468 MS
QTC - MUSE: 460 MS
QTC - MUSE: 462 MS
QTC - MUSE: 508 MS
R AXIS - MUSE: -15 DEGREES
R AXIS - MUSE: -16 DEGREES
R AXIS - MUSE: -6 DEGREES
RBC # BLD AUTO: 3.12 10E6/UL (ref 3.8–5.2)
RBC # BLD AUTO: 3.15 10E6/UL (ref 3.8–5.2)
RBC # BLD AUTO: 3.2 10E6/UL (ref 3.8–5.2)
RBC # BLD AUTO: 3.23 10E6/UL (ref 3.8–5.2)
RBC # BLD AUTO: 3.56 10E6/UL (ref 3.8–5.2)
RBC # BLD AUTO: 3.75 10E6/UL (ref 3.8–5.2)
RBC # BLD AUTO: 3.82 10E6/UL (ref 3.8–5.2)
RBC # BLD AUTO: 3.94 10E6/UL (ref 3.8–5.2)
RBC # BLD AUTO: 3.99 10E6/UL (ref 3.8–5.2)
RBC # BLD AUTO: 4.21 10E6/UL (ref 3.8–5.2)
RBC MORPH BLD: ABNORMAL
RBC URINE: 0 /HPF
RBC URINE: 1 /HPF
RBC URINE: <1 /HPF
RVA NSP5 STL QL NAA+PROBE: NOT DETECTED
SALMONELLA SP RPOD STL QL NAA+PROBE: NOT DETECTED
SARS-COV-2 RNA RESP QL NAA+PROBE: POSITIVE
SHIGELLA SP+EIEC IPAH STL QL NAA+PROBE: NOT DETECTED
SODIUM SERPL-SCNC: 134 MMOL/L (ref 136–145)
SODIUM SERPL-SCNC: 136 MMOL/L (ref 133–144)
SODIUM SERPL-SCNC: 136 MMOL/L (ref 133–144)
SODIUM SERPL-SCNC: 137 MMOL/L (ref 133–144)
SODIUM SERPL-SCNC: 137 MMOL/L (ref 136–145)
SODIUM SERPL-SCNC: 137 MMOL/L (ref 136–145)
SODIUM SERPL-SCNC: 138 MMOL/L (ref 133–144)
SODIUM SERPL-SCNC: 138 MMOL/L (ref 136–145)
SODIUM SERPL-SCNC: 140 MMOL/L (ref 133–144)
SODIUM SERPL-SCNC: 141 MMOL/L (ref 133–144)
SODIUM SERPL-SCNC: 142 MMOL/L (ref 136–145)
SP GR UR STRIP: 1.01 (ref 1–1.03)
SP GR UR STRIP: 1.02 (ref 1–1.03)
SP GR UR STRIP: 1.03 (ref 1–1.03)
SQUAMOUS EPITHELIAL: <1 /HPF
SYSTOLIC BLOOD PRESSURE - MUSE: NORMAL MMHG
T AXIS - MUSE: 19 DEGREES
T AXIS - MUSE: 28 DEGREES
T AXIS - MUSE: 35 DEGREES
TROPONIN T SERPL HS-MCNC: 22 NG/L
TROPONIN T SERPL HS-MCNC: 23 NG/L
TROPONIN T SERPL HS-MCNC: 42 NG/L
UROBILINOGEN UR STRIP-MCNC: <2 MG/DL
UROBILINOGEN UR STRIP-MCNC: NORMAL MG/DL
UROBILINOGEN UR STRIP-MCNC: NORMAL MG/DL
V CHOL+PARA RFBL+TRKH+TNAA STL QL NAA+PR: NOT DETECTED
VENTRICULAR RATE- MUSE: 108 BPM
VENTRICULAR RATE- MUSE: 71 BPM
VENTRICULAR RATE- MUSE: 92 BPM
WBC # BLD AUTO: 11.7 10E3/UL (ref 4–11)
WBC # BLD AUTO: 12.5 10E3/UL (ref 4–11)
WBC # BLD AUTO: 13.7 10E3/UL (ref 4–11)
WBC # BLD AUTO: 13.8 10E3/UL (ref 4–11)
WBC # BLD AUTO: 14.4 10E3/UL (ref 4–11)
WBC # BLD AUTO: 17.7 10E3/UL (ref 4–11)
WBC # BLD AUTO: 18.2 10E3/UL (ref 4–11)
WBC # BLD AUTO: 20.3 10E3/UL (ref 4–11)
WBC # BLD AUTO: 26.7 10E3/UL (ref 4–11)
WBC # BLD AUTO: 8.9 10E3/UL (ref 4–11)
WBC URINE: 1 /HPF
WBC URINE: 13 /HPF
WBC URINE: <1 /HPF
Y ENTERO RECN STL QL NAA+PROBE: NOT DETECTED

## 2022-01-01 PROCEDURE — 36415 COLL VENOUS BLD VENIPUNCTURE: CPT | Performed by: NURSE PRACTITIONER

## 2022-01-01 PROCEDURE — 82803 BLOOD GASES ANY COMBINATION: CPT | Performed by: STUDENT IN AN ORGANIZED HEALTH CARE EDUCATION/TRAINING PROGRAM

## 2022-01-01 PROCEDURE — 250N000013 HC RX MED GY IP 250 OP 250 PS 637: Performed by: PHYSICIAN ASSISTANT

## 2022-01-01 PROCEDURE — 82803 BLOOD GASES ANY COMBINATION: CPT | Performed by: HOSPITALIST

## 2022-01-01 PROCEDURE — 80048 BASIC METABOLIC PNL TOTAL CA: CPT | Performed by: PHYSICIAN ASSISTANT

## 2022-01-01 PROCEDURE — 36415 COLL VENOUS BLD VENIPUNCTURE: CPT | Mod: ORL | Performed by: NURSE PRACTITIONER

## 2022-01-01 PROCEDURE — 72040 X-RAY EXAM NECK SPINE 2-3 VW: CPT

## 2022-01-01 PROCEDURE — 82803 BLOOD GASES ANY COMBINATION: CPT | Performed by: NURSE PRACTITIONER

## 2022-01-01 PROCEDURE — 258N000003 HC RX IP 258 OP 636: Performed by: HOSPITALIST

## 2022-01-01 PROCEDURE — 96372 THER/PROPH/DIAG INJ SC/IM: CPT | Performed by: PHYSICIAN ASSISTANT

## 2022-01-01 PROCEDURE — 36415 COLL VENOUS BLD VENIPUNCTURE: CPT | Performed by: PHYSICIAN ASSISTANT

## 2022-01-01 PROCEDURE — 250N000013 HC RX MED GY IP 250 OP 250 PS 637: Performed by: SURGERY

## 2022-01-01 PROCEDURE — G0378 HOSPITAL OBSERVATION PER HR: HCPCS

## 2022-01-01 PROCEDURE — 250N000011 HC RX IP 250 OP 636: Performed by: EMERGENCY MEDICINE

## 2022-01-01 PROCEDURE — 250N000013 HC RX MED GY IP 250 OP 250 PS 637: Performed by: STUDENT IN AN ORGANIZED HEALTH CARE EDUCATION/TRAINING PROGRAM

## 2022-01-01 PROCEDURE — 94640 AIRWAY INHALATION TREATMENT: CPT

## 2022-01-01 PROCEDURE — 99284 EMERGENCY DEPT VISIT MOD MDM: CPT | Performed by: EMERGENCY MEDICINE

## 2022-01-01 PROCEDURE — U0003 INFECTIOUS AGENT DETECTION BY NUCLEIC ACID (DNA OR RNA); SEVERE ACUTE RESPIRATORY SYNDROME CORONAVIRUS 2 (SARS-COV-2) (CORONAVIRUS DISEASE [COVID-19]), AMPLIFIED PROBE TECHNIQUE, MAKING USE OF HIGH THROUGHPUT TECHNOLOGIES AS DESCRIBED BY CMS-2020-01-R: HCPCS | Mod: ORL | Performed by: INTERNAL MEDICINE

## 2022-01-01 PROCEDURE — 81001 URINALYSIS AUTO W/SCOPE: CPT | Performed by: EMERGENCY MEDICINE

## 2022-01-01 PROCEDURE — 94660 CPAP INITIATION&MGMT: CPT

## 2022-01-01 PROCEDURE — 84145 PROCALCITONIN (PCT): CPT | Performed by: EMERGENCY MEDICINE

## 2022-01-01 PROCEDURE — 71045 X-RAY EXAM CHEST 1 VIEW: CPT | Mod: 26 | Performed by: RADIOLOGY

## 2022-01-01 PROCEDURE — 36415 COLL VENOUS BLD VENIPUNCTURE: CPT | Performed by: EMERGENCY MEDICINE

## 2022-01-01 PROCEDURE — 71046 X-RAY EXAM CHEST 2 VIEWS: CPT

## 2022-01-01 PROCEDURE — 99225 PR SUBSEQUENT OBSERVATION CARE,LEVEL II: CPT | Performed by: PHYSICIAN ASSISTANT

## 2022-01-01 PROCEDURE — 96375 TX/PRO/DX INJ NEW DRUG ADDON: CPT | Performed by: EMERGENCY MEDICINE

## 2022-01-01 PROCEDURE — 80048 BASIC METABOLIC PNL TOTAL CA: CPT | Mod: ORL

## 2022-01-01 PROCEDURE — 999N000157 HC STATISTIC RCP TIME EA 10 MIN

## 2022-01-01 PROCEDURE — 85025 COMPLETE CBC W/AUTO DIFF WBC: CPT | Mod: ORL | Performed by: NURSE PRACTITIONER

## 2022-01-01 PROCEDURE — 70450 CT HEAD/BRAIN W/O DYE: CPT

## 2022-01-01 PROCEDURE — 85027 COMPLETE CBC AUTOMATED: CPT | Performed by: NURSE PRACTITIONER

## 2022-01-01 PROCEDURE — 250N000011 HC RX IP 250 OP 636: Performed by: STUDENT IN AN ORGANIZED HEALTH CARE EDUCATION/TRAINING PROGRAM

## 2022-01-01 PROCEDURE — 250N000009 HC RX 250: Performed by: PHYSICIAN ASSISTANT

## 2022-01-01 PROCEDURE — 80048 BASIC METABOLIC PNL TOTAL CA: CPT | Mod: ORL | Performed by: NURSE PRACTITIONER

## 2022-01-01 PROCEDURE — 250N000011 HC RX IP 250 OP 636: Performed by: PHYSICIAN ASSISTANT

## 2022-01-01 PROCEDURE — 87506 IADNA-DNA/RNA PROBE TQ 6-11: CPT | Performed by: EMERGENCY MEDICINE

## 2022-01-01 PROCEDURE — 81001 URINALYSIS AUTO W/SCOPE: CPT | Mod: ORL | Performed by: NURSE PRACTITIONER

## 2022-01-01 PROCEDURE — P9603 ONE-WAY ALLOW PRORATED MILES: HCPCS | Mod: ORL | Performed by: NURSE PRACTITIONER

## 2022-01-01 PROCEDURE — 73502 X-RAY EXAM HIP UNI 2-3 VIEWS: CPT

## 2022-01-01 PROCEDURE — 70450 CT HEAD/BRAIN W/O DYE: CPT | Mod: 26 | Performed by: RADIOLOGY

## 2022-01-01 PROCEDURE — 71045 X-RAY EXAM CHEST 1 VIEW: CPT

## 2022-01-01 PROCEDURE — 85027 COMPLETE CBC AUTOMATED: CPT | Performed by: PHYSICIAN ASSISTANT

## 2022-01-01 PROCEDURE — 99285 EMERGENCY DEPT VISIT HI MDM: CPT | Mod: 25 | Performed by: EMERGENCY MEDICINE

## 2022-01-01 PROCEDURE — 72125 CT NECK SPINE W/O DYE: CPT

## 2022-01-01 PROCEDURE — 84484 ASSAY OF TROPONIN QUANT: CPT | Performed by: NURSE PRACTITIONER

## 2022-01-01 PROCEDURE — 72125 CT NECK SPINE W/O DYE: CPT | Mod: 26 | Performed by: RADIOLOGY

## 2022-01-01 PROCEDURE — 84484 ASSAY OF TROPONIN QUANT: CPT | Performed by: EMERGENCY MEDICINE

## 2022-01-01 PROCEDURE — 87040 BLOOD CULTURE FOR BACTERIA: CPT | Performed by: NURSE PRACTITIONER

## 2022-01-01 PROCEDURE — 85018 HEMOGLOBIN: CPT | Performed by: STUDENT IN AN ORGANIZED HEALTH CARE EDUCATION/TRAINING PROGRAM

## 2022-01-01 PROCEDURE — 250N000013 HC RX MED GY IP 250 OP 250 PS 637: Performed by: EMERGENCY MEDICINE

## 2022-01-01 PROCEDURE — 83690 ASSAY OF LIPASE: CPT | Performed by: EMERGENCY MEDICINE

## 2022-01-01 PROCEDURE — 99207 PR APP CREDIT; MD BILLING SHARED VISIT: CPT | Performed by: STUDENT IN AN ORGANIZED HEALTH CARE EDUCATION/TRAINING PROGRAM

## 2022-01-01 PROCEDURE — 99218 PR INITIAL OBSERVATION CARE,LEVEL I: CPT | Performed by: NURSE PRACTITIONER

## 2022-01-01 PROCEDURE — 258N000003 HC RX IP 258 OP 636: Performed by: PHYSICIAN ASSISTANT

## 2022-01-01 PROCEDURE — 250N000009 HC RX 250: Performed by: NURSE PRACTITIONER

## 2022-01-01 PROCEDURE — 96375 TX/PRO/DX INJ NEW DRUG ADDON: CPT

## 2022-01-01 PROCEDURE — 71046 X-RAY EXAM CHEST 2 VIEWS: CPT | Mod: 26 | Performed by: RADIOLOGY

## 2022-01-01 PROCEDURE — 80053 COMPREHEN METABOLIC PANEL: CPT | Performed by: EMERGENCY MEDICINE

## 2022-01-01 PROCEDURE — 86140 C-REACTIVE PROTEIN: CPT | Performed by: PHYSICIAN ASSISTANT

## 2022-01-01 PROCEDURE — 80053 COMPREHEN METABOLIC PANEL: CPT | Performed by: NURSE PRACTITIONER

## 2022-01-01 PROCEDURE — 73502 X-RAY EXAM HIP UNI 2-3 VIEWS: CPT | Mod: 26 | Performed by: RADIOLOGY

## 2022-01-01 PROCEDURE — 36415 COLL VENOUS BLD VENIPUNCTURE: CPT | Performed by: STUDENT IN AN ORGANIZED HEALTH CARE EDUCATION/TRAINING PROGRAM

## 2022-01-01 PROCEDURE — 99291 CRITICAL CARE FIRST HOUR: CPT | Performed by: HOSPITALIST

## 2022-01-01 PROCEDURE — 81001 URINALYSIS AUTO W/SCOPE: CPT | Mod: ORL

## 2022-01-01 PROCEDURE — 36592 COLLECT BLOOD FROM PICC: CPT | Performed by: NURSE PRACTITIONER

## 2022-01-01 PROCEDURE — 93005 ELECTROCARDIOGRAM TRACING: CPT

## 2022-01-01 PROCEDURE — 82040 ASSAY OF SERUM ALBUMIN: CPT | Performed by: NURSE PRACTITIONER

## 2022-01-01 PROCEDURE — 72040 X-RAY EXAM NECK SPINE 2-3 VW: CPT | Mod: 26 | Performed by: RADIOLOGY

## 2022-01-01 PROCEDURE — 83605 ASSAY OF LACTIC ACID: CPT | Performed by: STUDENT IN AN ORGANIZED HEALTH CARE EDUCATION/TRAINING PROGRAM

## 2022-01-01 PROCEDURE — 81001 URINALYSIS AUTO W/SCOPE: CPT | Performed by: PHYSICIAN ASSISTANT

## 2022-01-01 PROCEDURE — 93010 ELECTROCARDIOGRAM REPORT: CPT | Performed by: INTERNAL MEDICINE

## 2022-01-01 PROCEDURE — 85018 HEMOGLOBIN: CPT | Mod: ORL | Performed by: NURSE PRACTITIONER

## 2022-01-01 PROCEDURE — 87186 SC STD MICRODIL/AGAR DIL: CPT | Performed by: PHYSICIAN ASSISTANT

## 2022-01-01 PROCEDURE — 258N000003 HC RX IP 258 OP 636: Performed by: STUDENT IN AN ORGANIZED HEALTH CARE EDUCATION/TRAINING PROGRAM

## 2022-01-01 PROCEDURE — 200N000002 HC R&B ICU UMMC

## 2022-01-01 PROCEDURE — 85048 AUTOMATED LEUKOCYTE COUNT: CPT | Performed by: STUDENT IN AN ORGANIZED HEALTH CARE EDUCATION/TRAINING PROGRAM

## 2022-01-01 PROCEDURE — 85014 HEMATOCRIT: CPT | Performed by: EMERGENCY MEDICINE

## 2022-01-01 PROCEDURE — 96365 THER/PROPH/DIAG IV INF INIT: CPT | Performed by: EMERGENCY MEDICINE

## 2022-01-01 PROCEDURE — 99239 HOSP IP/OBS DSCHRG MGMT >30: CPT | Performed by: STUDENT IN AN ORGANIZED HEALTH CARE EDUCATION/TRAINING PROGRAM

## 2022-01-01 PROCEDURE — 96365 THER/PROPH/DIAG IV INF INIT: CPT

## 2022-01-01 PROCEDURE — 85025 COMPLETE CBC W/AUTO DIFF WBC: CPT | Performed by: EMERGENCY MEDICINE

## 2022-01-01 PROCEDURE — 5A09357 ASSISTANCE WITH RESPIRATORY VENTILATION, LESS THAN 24 CONSECUTIVE HOURS, CONTINUOUS POSITIVE AIRWAY PRESSURE: ICD-10-PCS | Performed by: EMERGENCY MEDICINE

## 2022-01-01 PROCEDURE — 82962 GLUCOSE BLOOD TEST: CPT

## 2022-01-01 PROCEDURE — 120N000002 HC R&B MED SURG/OB UMMC

## 2022-01-01 PROCEDURE — 99284 EMERGENCY DEPT VISIT MOD MDM: CPT

## 2022-01-01 PROCEDURE — 80048 BASIC METABOLIC PNL TOTAL CA: CPT | Performed by: STUDENT IN AN ORGANIZED HEALTH CARE EDUCATION/TRAINING PROGRAM

## 2022-01-01 PROCEDURE — 99231 SBSQ HOSP IP/OBS SF/LOW 25: CPT | Performed by: NURSE PRACTITIONER

## 2022-01-01 PROCEDURE — P9603 ONE-WAY ALLOW PRORATED MILES: HCPCS | Mod: ORL

## 2022-01-01 PROCEDURE — 36415 COLL VENOUS BLD VENIPUNCTURE: CPT | Mod: ORL

## 2022-01-01 PROCEDURE — 99239 HOSP IP/OBS DSCHRG MGMT >30: CPT | Performed by: NURSE PRACTITIONER

## 2022-01-01 PROCEDURE — 999N000215 HC STATISTIC HFNC ADULT NON-CPAP

## 2022-01-01 PROCEDURE — 250N000013 HC RX MED GY IP 250 OP 250 PS 637: Performed by: NURSE PRACTITIONER

## 2022-01-01 RX ORDER — POTASSIUM CHLORIDE 750 MG/1
20 TABLET, EXTENDED RELEASE ORAL ONCE
Status: DISCONTINUED | OUTPATIENT
Start: 2022-01-01 | End: 2022-01-01

## 2022-01-01 RX ORDER — DIPHENHYDRAMINE HCL 25 MG
2 CAPSULE ORAL 2 TIMES DAILY
Qty: 30 ML | Refills: 11 | Status: SHIPPED | OUTPATIENT
Start: 2022-01-01

## 2022-01-01 RX ORDER — AMOXICILLIN 250 MG
1-2 CAPSULE ORAL 2 TIMES DAILY
Status: DISCONTINUED | OUTPATIENT
Start: 2022-01-01 | End: 2022-01-01 | Stop reason: HOSPADM

## 2022-01-01 RX ORDER — CARBOXYMETHYLCELLULOSE SODIUM 5 MG/ML
2 SOLUTION/ DROPS OPHTHALMIC 2 TIMES DAILY
Status: DISCONTINUED | OUTPATIENT
Start: 2022-01-01 | End: 2022-01-01 | Stop reason: HOSPADM

## 2022-01-01 RX ORDER — HALOPERIDOL 5 MG/ML
2 INJECTION INTRAMUSCULAR EVERY 6 HOURS PRN
Status: DISCONTINUED | OUTPATIENT
Start: 2022-01-01 | End: 2022-01-01

## 2022-01-01 RX ORDER — ASPIRIN 81 MG/1
81 TABLET, CHEWABLE ORAL EVERY MORNING
Status: DISCONTINUED | OUTPATIENT
Start: 2022-01-01 | End: 2022-01-01 | Stop reason: HOSPADM

## 2022-01-01 RX ORDER — DIVALPROEX SODIUM 125 MG/1
125 CAPSULE, COATED PELLETS ORAL 2 TIMES DAILY
COMMUNITY
Start: 2022-01-01 | End: 2022-01-01

## 2022-01-01 RX ORDER — LEVOFLOXACIN 5 MG/ML
750 INJECTION, SOLUTION INTRAVENOUS ONCE
Status: COMPLETED | OUTPATIENT
Start: 2022-01-01 | End: 2022-01-01

## 2022-01-01 RX ORDER — LIDOCAINE 40 MG/G
CREAM TOPICAL
Status: DISCONTINUED | OUTPATIENT
Start: 2022-01-01 | End: 2022-01-01 | Stop reason: HOSPADM

## 2022-01-01 RX ORDER — CITALOPRAM HYDROBROMIDE 10 MG/1
10 TABLET ORAL DAILY
Qty: 30 TABLET | Refills: 11 | Status: SHIPPED | OUTPATIENT
Start: 2022-01-01 | End: 2022-01-01 | Stop reason: DRUGHIGH

## 2022-01-01 RX ORDER — QUETIAPINE FUMARATE 50 MG/1
50 TABLET, FILM COATED ORAL 2 TIMES DAILY
Qty: 90 TABLET | Refills: 11 | Status: SHIPPED | OUTPATIENT
Start: 2022-01-01

## 2022-01-01 RX ORDER — MOXIFLOXACIN HYDROCHLORIDE 400 MG/250ML
400 INJECTION, SOLUTION INTRAVENOUS EVERY 24 HOURS
Status: DISCONTINUED | OUTPATIENT
Start: 2022-01-01 | End: 2022-01-01

## 2022-01-01 RX ORDER — POTASSIUM CHLORIDE 1.5 G/1.58G
20 POWDER, FOR SOLUTION ORAL ONCE
Status: COMPLETED | OUTPATIENT
Start: 2022-01-01 | End: 2022-01-01

## 2022-01-01 RX ORDER — DEXTROSE, SODIUM CHLORIDE, SODIUM LACTATE, POTASSIUM CHLORIDE, AND CALCIUM CHLORIDE 5; .6; .31; .03; .02 G/100ML; G/100ML; G/100ML; G/100ML; G/100ML
INJECTION, SOLUTION INTRAVENOUS CONTINUOUS
Status: ACTIVE | OUTPATIENT
Start: 2022-01-01 | End: 2022-01-01

## 2022-01-01 RX ORDER — DIVALPROEX SODIUM 125 MG/1
125 TABLET, DELAYED RELEASE ORAL 2 TIMES DAILY
Status: DISCONTINUED | OUTPATIENT
Start: 2022-01-01 | End: 2022-01-01

## 2022-01-01 RX ORDER — ONDANSETRON 2 MG/ML
4 INJECTION INTRAMUSCULAR; INTRAVENOUS EVERY 30 MIN PRN
Status: DISCONTINUED | OUTPATIENT
Start: 2022-01-01 | End: 2022-01-01 | Stop reason: DRUGHIGH

## 2022-01-01 RX ORDER — ACETAMINOPHEN 325 MG/1
650 TABLET ORAL EVERY 4 HOURS PRN
Status: DISCONTINUED | OUTPATIENT
Start: 2022-01-01 | End: 2022-01-01 | Stop reason: HOSPADM

## 2022-01-01 RX ORDER — DIPHENHYDRAMINE HCL 25 MG
2 CAPSULE ORAL 2 TIMES DAILY
COMMUNITY
End: 2022-01-01

## 2022-01-01 RX ORDER — LEVOFLOXACIN 5 MG/ML
750 INJECTION, SOLUTION INTRAVENOUS EVERY 24 HOURS
Status: DISCONTINUED | OUTPATIENT
Start: 2022-01-01 | End: 2022-01-01

## 2022-01-01 RX ORDER — POLYETHYLENE GLYCOL 3350 17 G/17G
17 POWDER, FOR SOLUTION ORAL DAILY
Status: DISCONTINUED | OUTPATIENT
Start: 2022-01-01 | End: 2022-01-01 | Stop reason: HOSPADM

## 2022-01-01 RX ORDER — QUETIAPINE FUMARATE 50 MG/1
50 TABLET, FILM COATED ORAL 2 TIMES DAILY
Status: DISCONTINUED | OUTPATIENT
Start: 2022-01-01 | End: 2022-01-01

## 2022-01-01 RX ORDER — ONDANSETRON 2 MG/ML
4 INJECTION INTRAMUSCULAR; INTRAVENOUS EVERY 6 HOURS PRN
Status: DISCONTINUED | OUTPATIENT
Start: 2022-01-01 | End: 2022-01-01 | Stop reason: HOSPADM

## 2022-01-01 RX ORDER — NALOXONE HYDROCHLORIDE 0.4 MG/ML
0.1 INJECTION, SOLUTION INTRAMUSCULAR; INTRAVENOUS; SUBCUTANEOUS
Status: DISCONTINUED | OUTPATIENT
Start: 2022-01-01 | End: 2022-01-01 | Stop reason: HOSPADM

## 2022-01-01 RX ORDER — QUETIAPINE FUMARATE 50 MG/1
50 TABLET, FILM COATED ORAL 3 TIMES DAILY PRN
Status: DISCONTINUED | OUTPATIENT
Start: 2022-01-01 | End: 2022-01-01 | Stop reason: HOSPADM

## 2022-01-01 RX ORDER — OFLOXACIN 3 MG/ML
1 SOLUTION/ DROPS OPHTHALMIC 4 TIMES DAILY
Qty: 2 ML | Refills: 0 | Status: SHIPPED | OUTPATIENT
Start: 2022-01-01 | End: 2022-01-01

## 2022-01-01 RX ORDER — QUETIAPINE FUMARATE 25 MG/1
25 TABLET, FILM COATED ORAL DAILY
Status: DISCONTINUED | OUTPATIENT
Start: 2022-01-01 | End: 2022-01-01

## 2022-01-01 RX ORDER — ATORVASTATIN CALCIUM 10 MG/1
10 TABLET, FILM COATED ORAL DAILY
Status: DISCONTINUED | OUTPATIENT
Start: 2022-01-01 | End: 2022-01-01

## 2022-01-01 RX ORDER — QUETIAPINE FUMARATE 25 MG/1
6.25 TABLET, FILM COATED ORAL 2 TIMES DAILY
Qty: 30 TABLET | Refills: 3 | Status: SHIPPED | OUTPATIENT
Start: 2022-01-01 | End: 2022-01-01

## 2022-01-01 RX ORDER — ATORVASTATIN CALCIUM 10 MG/1
10 TABLET, FILM COATED ORAL EVERY EVENING
Status: DISCONTINUED | OUTPATIENT
Start: 2022-01-01 | End: 2022-01-01 | Stop reason: HOSPADM

## 2022-01-01 RX ORDER — ACETAMINOPHEN 500 MG
1000 TABLET ORAL ONCE
Status: DISCONTINUED | OUTPATIENT
Start: 2022-01-01 | End: 2022-01-01

## 2022-01-01 RX ORDER — SULFAMETHOXAZOLE/TRIMETHOPRIM 800-160 MG
1 TABLET ORAL 2 TIMES DAILY
Status: DISCONTINUED | OUTPATIENT
Start: 2022-01-01 | End: 2022-01-01 | Stop reason: HOSPADM

## 2022-01-01 RX ORDER — CEFTRIAXONE 1 G/1
1 INJECTION, POWDER, FOR SOLUTION INTRAMUSCULAR; INTRAVENOUS EVERY 24 HOURS
Status: DISCONTINUED | OUTPATIENT
Start: 2022-01-01 | End: 2022-01-01

## 2022-01-01 RX ORDER — OLANZAPINE 5 MG/1
10 TABLET, ORALLY DISINTEGRATING ORAL ONCE
Status: COMPLETED | OUTPATIENT
Start: 2022-01-01 | End: 2022-01-01

## 2022-01-01 RX ORDER — ENOXAPARIN SODIUM 100 MG/ML
30 INJECTION SUBCUTANEOUS EVERY 12 HOURS
Status: DISCONTINUED | OUTPATIENT
Start: 2022-01-01 | End: 2022-01-01 | Stop reason: HOSPADM

## 2022-01-01 RX ORDER — SULFAMETHOXAZOLE/TRIMETHOPRIM 800-160 MG
1 TABLET ORAL 2 TIMES DAILY
Qty: 3 TABLET | Refills: 0 | Status: SHIPPED | OUTPATIENT
Start: 2022-01-01 | End: 2022-01-01

## 2022-01-01 RX ORDER — IPRATROPIUM BROMIDE AND ALBUTEROL SULFATE 2.5; .5 MG/3ML; MG/3ML
3 SOLUTION RESPIRATORY (INHALATION) EVERY 4 HOURS PRN
Status: DISCONTINUED | OUTPATIENT
Start: 2022-01-01 | End: 2022-01-01 | Stop reason: HOSPADM

## 2022-01-01 RX ORDER — OLANZAPINE 5 MG/1
5 TABLET, ORALLY DISINTEGRATING ORAL ONCE
Status: COMPLETED | OUTPATIENT
Start: 2022-01-01 | End: 2022-01-01

## 2022-01-01 RX ORDER — CITALOPRAM HYDROBROMIDE 20 MG/1
TABLET ORAL
Qty: 135 TABLET | Refills: 11 | Status: SHIPPED | OUTPATIENT
Start: 2022-01-01

## 2022-01-01 RX ORDER — POTASSIUM CHLORIDE 29.8 MG/ML
20 INJECTION INTRAVENOUS ONCE
Status: COMPLETED | OUTPATIENT
Start: 2022-01-01 | End: 2022-01-01

## 2022-01-01 RX ORDER — CITALOPRAM HYDROBROMIDE 10 MG/1
10 TABLET ORAL DAILY
COMMUNITY
Start: 2022-01-01 | End: 2022-01-01

## 2022-01-01 RX ORDER — CARBOXYMETHYLCELLULOSE SODIUM 5 MG/ML
1 SOLUTION/ DROPS OPHTHALMIC
Status: DISCONTINUED | OUTPATIENT
Start: 2022-01-01 | End: 2022-01-01 | Stop reason: HOSPADM

## 2022-01-01 RX ORDER — POLYETHYLENE GLYCOL 3350 17 G/17G
POWDER, FOR SOLUTION ORAL
Qty: 510 G | Refills: 97 | Status: SHIPPED | OUTPATIENT
Start: 2022-01-01

## 2022-01-01 RX ORDER — DONEPEZIL HYDROCHLORIDE 5 MG/1
5 TABLET, FILM COATED ORAL DAILY
Status: DISCONTINUED | OUTPATIENT
Start: 2022-01-01 | End: 2022-01-01 | Stop reason: HOSPADM

## 2022-01-01 RX ORDER — CITALOPRAM HYDROBROMIDE 20 MG/1
20 TABLET ORAL DAILY
Status: DISCONTINUED | OUTPATIENT
Start: 2022-01-01 | End: 2022-01-01 | Stop reason: HOSPADM

## 2022-01-01 RX ORDER — ONDANSETRON 4 MG/1
4 TABLET, ORALLY DISINTEGRATING ORAL EVERY 6 HOURS PRN
Status: DISCONTINUED | OUTPATIENT
Start: 2022-01-01 | End: 2022-01-01 | Stop reason: HOSPADM

## 2022-01-01 RX ORDER — QUETIAPINE FUMARATE 50 MG/1
TABLET, FILM COATED ORAL
COMMUNITY
Start: 2022-01-01 | End: 2022-01-01

## 2022-01-01 RX ORDER — ACETAMINOPHEN 325 MG/1
975 TABLET ORAL 3 TIMES DAILY
Qty: 90 TABLET | Refills: 11 | Status: SHIPPED | OUTPATIENT
Start: 2022-01-01

## 2022-01-01 RX ORDER — ACETAMINOPHEN 325 MG/1
975 TABLET ORAL 3 TIMES DAILY
COMMUNITY
End: 2022-01-01

## 2022-01-01 RX ORDER — HALOPERIDOL 2 MG/ML
2 SOLUTION ORAL EVERY 6 HOURS PRN
Status: DISCONTINUED | OUTPATIENT
Start: 2022-01-01 | End: 2022-01-01

## 2022-01-01 RX ORDER — LABETALOL HYDROCHLORIDE 5 MG/ML
10 INJECTION, SOLUTION INTRAVENOUS EVERY 4 HOURS PRN
Status: DISCONTINUED | OUTPATIENT
Start: 2022-01-01 | End: 2022-01-01

## 2022-01-01 RX ORDER — HALOPERIDOL 2 MG/ML
2 SOLUTION ORAL EVERY 4 HOURS PRN
Status: DISCONTINUED | OUTPATIENT
Start: 2022-01-01 | End: 2022-01-01

## 2022-01-01 RX ORDER — QUETIAPINE FUMARATE 50 MG/1
50 TABLET, FILM COATED ORAL 3 TIMES DAILY
Status: DISCONTINUED | OUTPATIENT
Start: 2022-01-01 | End: 2022-01-01 | Stop reason: HOSPADM

## 2022-01-01 RX ORDER — VANCOMYCIN HYDROCHLORIDE 1 G/200ML
1000 INJECTION, SOLUTION INTRAVENOUS
Status: DISCONTINUED | OUTPATIENT
Start: 2022-01-01 | End: 2022-01-01

## 2022-01-01 RX ORDER — QUETIAPINE FUMARATE 25 MG/1
12.5 TABLET, FILM COATED ORAL 2 TIMES DAILY
Qty: 30 TABLET | Refills: 4 | Status: SHIPPED | OUTPATIENT
Start: 2022-01-01 | End: 2022-01-01 | Stop reason: DRUGHIGH

## 2022-01-01 RX ORDER — SODIUM CHLORIDE, SODIUM LACTATE, POTASSIUM CHLORIDE, CALCIUM CHLORIDE 600; 310; 30; 20 MG/100ML; MG/100ML; MG/100ML; MG/100ML
INJECTION, SOLUTION INTRAVENOUS CONTINUOUS
Status: DISCONTINUED | OUTPATIENT
Start: 2022-01-01 | End: 2022-01-01

## 2022-01-01 RX ORDER — POLYETHYLENE GLYCOL 3350 17 G/17G
17 POWDER, FOR SOLUTION ORAL DAILY PRN
Status: DISCONTINUED | OUTPATIENT
Start: 2022-01-01 | End: 2022-01-01 | Stop reason: HOSPADM

## 2022-01-01 RX ORDER — MORPHINE SULFATE 2 MG/ML
1-2 INJECTION, SOLUTION INTRAMUSCULAR; INTRAVENOUS
Status: DISCONTINUED | OUTPATIENT
Start: 2022-01-01 | End: 2022-01-01 | Stop reason: HOSPADM

## 2022-01-01 RX ORDER — ONDANSETRON 2 MG/ML
4 INJECTION INTRAMUSCULAR; INTRAVENOUS EVERY 6 HOURS PRN
Status: DISCONTINUED | OUTPATIENT
Start: 2022-01-01 | End: 2022-01-01

## 2022-01-01 RX ORDER — ONDANSETRON 4 MG/1
4 TABLET, ORALLY DISINTEGRATING ORAL EVERY 6 HOURS PRN
Status: DISCONTINUED | OUTPATIENT
Start: 2022-01-01 | End: 2022-01-01

## 2022-01-01 RX ORDER — DONEPEZIL HYDROCHLORIDE 5 MG/1
5 TABLET, FILM COATED ORAL DAILY
Status: DISCONTINUED | OUTPATIENT
Start: 2022-01-01 | End: 2022-01-01

## 2022-01-01 RX ORDER — NALOXONE HYDROCHLORIDE 0.4 MG/ML
0.2 INJECTION, SOLUTION INTRAMUSCULAR; INTRAVENOUS; SUBCUTANEOUS
Status: DISCONTINUED | OUTPATIENT
Start: 2022-01-01 | End: 2022-01-01 | Stop reason: HOSPADM

## 2022-01-01 RX ORDER — DONEPEZIL HYDROCHLORIDE 5 MG/1
5 TABLET, FILM COATED ORAL DAILY
Qty: 14 TABLET | Refills: 0 | Status: SHIPPED | OUTPATIENT
Start: 2022-01-01 | End: 2022-01-01

## 2022-01-01 RX ORDER — QUETIAPINE FUMARATE 25 MG/1
25 TABLET, FILM COATED ORAL 2 TIMES DAILY
Qty: 60 TABLET | Refills: 11 | Status: SHIPPED | OUTPATIENT
Start: 2022-01-01 | End: 2022-01-01 | Stop reason: DRUGHIGH

## 2022-01-01 RX ORDER — TOBRAMYCIN AND DEXAMETHASONE 3; 1 MG/ML; MG/ML
1 SUSPENSION/ DROPS OPHTHALMIC 4 TIMES DAILY
Qty: 1.4 ML | Refills: 0 | Status: SHIPPED | OUTPATIENT
Start: 2022-01-01 | End: 2022-01-01

## 2022-01-01 RX ORDER — DIVALPROEX SODIUM 125 MG/1
TABLET, DELAYED RELEASE ORAL
Qty: 60 TABLET | Refills: 11 | Status: SHIPPED | OUTPATIENT
Start: 2022-01-01

## 2022-01-01 RX ORDER — CITALOPRAM HYDROBROMIDE 10 MG/1
30 TABLET ORAL DAILY
Status: DISCONTINUED | OUTPATIENT
Start: 2022-01-01 | End: 2022-01-01

## 2022-01-01 RX ORDER — HALOPERIDOL 5 MG/ML
2 INJECTION INTRAMUSCULAR EVERY 4 HOURS PRN
Status: DISCONTINUED | OUTPATIENT
Start: 2022-01-01 | End: 2022-01-01

## 2022-01-01 RX ORDER — QUETIAPINE FUMARATE 50 MG/1
TABLET, FILM COATED ORAL
Qty: 90 TABLET | Refills: 11 | Status: SHIPPED | OUTPATIENT
Start: 2022-01-01 | End: 2022-01-01

## 2022-01-01 RX ORDER — METRONIDAZOLE 500 MG/100ML
500 INJECTION, SOLUTION INTRAVENOUS EVERY 12 HOURS
Status: DISCONTINUED | OUTPATIENT
Start: 2022-01-01 | End: 2022-01-01

## 2022-01-01 RX ORDER — DONEPEZIL HYDROCHLORIDE 5 MG/1
TABLET, FILM COATED ORAL
Qty: 90 TABLET | Refills: 3 | Status: SHIPPED | OUTPATIENT
Start: 2022-01-01

## 2022-01-01 RX ORDER — HYDRALAZINE HYDROCHLORIDE 20 MG/ML
10 INJECTION INTRAMUSCULAR; INTRAVENOUS EVERY 6 HOURS PRN
Status: DISCONTINUED | OUTPATIENT
Start: 2022-01-01 | End: 2022-01-01

## 2022-01-01 RX ORDER — QUETIAPINE FUMARATE 100 MG/1
TABLET, FILM COATED ORAL
Qty: 15 TABLET | Refills: 97 | Status: SHIPPED | OUTPATIENT
Start: 2022-01-01 | End: 2022-01-01

## 2022-01-01 RX ORDER — CYCLOBENZAPRINE HCL 5 MG
5 TABLET ORAL 3 TIMES DAILY PRN
Status: DISCONTINUED | OUTPATIENT
Start: 2022-01-01 | End: 2022-01-01 | Stop reason: HOSPADM

## 2022-01-01 RX ORDER — DIVALPROEX SODIUM 125 MG/1
125 CAPSULE, COATED PELLETS ORAL 2 TIMES DAILY
Status: DISCONTINUED | OUTPATIENT
Start: 2022-01-01 | End: 2022-01-01 | Stop reason: HOSPADM

## 2022-01-01 RX ORDER — QUETIAPINE FUMARATE 25 MG/1
6.25 TABLET, FILM COATED ORAL 2 TIMES DAILY
COMMUNITY
End: 2022-01-01

## 2022-01-01 RX ADMIN — POTASSIUM CHLORIDE 20 MEQ: 1.5 POWDER, FOR SOLUTION ORAL at 14:48

## 2022-01-01 RX ADMIN — QUETIAPINE FUMARATE 50 MG: 50 TABLET ORAL at 17:35

## 2022-01-01 RX ADMIN — ENOXAPARIN SODIUM 30 MG: 30 INJECTION SUBCUTANEOUS at 23:30

## 2022-01-01 RX ADMIN — SODIUM CHLORIDE, POTASSIUM CHLORIDE, SODIUM LACTATE AND CALCIUM CHLORIDE 500 ML: 600; 310; 30; 20 INJECTION, SOLUTION INTRAVENOUS at 05:19

## 2022-01-01 RX ADMIN — QUETIAPINE FUMARATE 50 MG: 50 TABLET ORAL at 15:27

## 2022-01-01 RX ADMIN — DIVALPROEX SODIUM 125 MG: 125 CAPSULE, COATED PELLETS ORAL at 10:36

## 2022-01-01 RX ADMIN — POLYETHYLENE GLYCOL 3350 17 G: 17 POWDER, FOR SOLUTION ORAL at 08:30

## 2022-01-01 RX ADMIN — MORPHINE SULFATE 2 MG: 2 INJECTION, SOLUTION INTRAMUSCULAR; INTRAVENOUS at 12:47

## 2022-01-01 RX ADMIN — QUETIAPINE FUMARATE 50 MG: 50 TABLET ORAL at 06:03

## 2022-01-01 RX ADMIN — POTASSIUM CHLORIDE 20 MEQ: 29.8 INJECTION, SOLUTION INTRAVENOUS at 09:19

## 2022-01-01 RX ADMIN — SULFAMETHOXAZOLE AND TRIMETHOPRIM 1 TABLET: 800; 160 TABLET ORAL at 12:23

## 2022-01-01 RX ADMIN — QUETIAPINE FUMARATE 50 MG: 50 TABLET ORAL at 08:40

## 2022-01-01 RX ADMIN — QUETIAPINE FUMARATE 50 MG: 50 TABLET ORAL at 19:47

## 2022-01-01 RX ADMIN — ENOXAPARIN SODIUM 30 MG: 30 INJECTION SUBCUTANEOUS at 12:17

## 2022-01-01 RX ADMIN — QUETIAPINE FUMARATE 50 MG: 50 TABLET ORAL at 16:32

## 2022-01-01 RX ADMIN — DIVALPROEX SODIUM 125 MG: 125 CAPSULE, COATED PELLETS ORAL at 09:27

## 2022-01-01 RX ADMIN — ATORVASTATIN CALCIUM 10 MG: 10 TABLET, FILM COATED ORAL at 19:47

## 2022-01-01 RX ADMIN — DIVALPROEX SODIUM 125 MG: 125 CAPSULE, COATED PELLETS ORAL at 21:10

## 2022-01-01 RX ADMIN — SODIUM CHLORIDE, SODIUM LACTATE, POTASSIUM CHLORIDE, CALCIUM CHLORIDE AND DEXTROSE MONOHYDRATE: 5; 600; 310; 30; 20 INJECTION, SOLUTION INTRAVENOUS at 12:31

## 2022-01-01 RX ADMIN — MIDAZOLAM HYDROCHLORIDE 2 MG: 1 INJECTION, SOLUTION INTRAMUSCULAR; INTRAVENOUS at 12:47

## 2022-01-01 RX ADMIN — MORPHINE SULFATE 2 MG: 2 INJECTION, SOLUTION INTRAMUSCULAR; INTRAVENOUS at 11:45

## 2022-01-01 RX ADMIN — SULFAMETHOXAZOLE AND TRIMETHOPRIM 1 TABLET: 800; 160 TABLET ORAL at 10:36

## 2022-01-01 RX ADMIN — POLYETHYLENE GLYCOL 3350 17 G: 17 POWDER, FOR SOLUTION ORAL at 10:35

## 2022-01-01 RX ADMIN — QUETIAPINE FUMARATE 50 MG: 50 TABLET ORAL at 23:29

## 2022-01-01 RX ADMIN — QUETIAPINE FUMARATE 50 MG: 50 TABLET ORAL at 19:59

## 2022-01-01 RX ADMIN — DOCUSATE SODIUM 50 MG AND SENNOSIDES 8.6 MG 1 TABLET: 8.6; 5 TABLET, FILM COATED ORAL at 08:40

## 2022-01-01 RX ADMIN — CITALOPRAM HYDROBROMIDE 20 MG: 20 TABLET ORAL at 09:27

## 2022-01-01 RX ADMIN — ASPIRIN 81 MG: 81 TABLET, CHEWABLE ORAL at 08:40

## 2022-01-01 RX ADMIN — MORPHINE SULFATE 1 MG: 2 INJECTION, SOLUTION INTRAMUSCULAR; INTRAVENOUS at 09:58

## 2022-01-01 RX ADMIN — QUETIAPINE FUMARATE 50 MG: 50 TABLET ORAL at 08:31

## 2022-01-01 RX ADMIN — DOCUSATE SODIUM 50 MG AND SENNOSIDES 8.6 MG 2 TABLET: 8.6; 5 TABLET, FILM COATED ORAL at 10:36

## 2022-01-01 RX ADMIN — ENOXAPARIN SODIUM 30 MG: 30 INJECTION SUBCUTANEOUS at 12:23

## 2022-01-01 RX ADMIN — HALOPERIDOL LACTATE 2 MG: 5 INJECTION, SOLUTION INTRAMUSCULAR at 14:16

## 2022-01-01 RX ADMIN — ATORVASTATIN CALCIUM 10 MG: 10 TABLET, FILM COATED ORAL at 20:01

## 2022-01-01 RX ADMIN — CITALOPRAM HYDROBROMIDE 20 MG: 20 TABLET ORAL at 10:36

## 2022-01-01 RX ADMIN — DIVALPROEX SODIUM 125 MG: 125 CAPSULE, COATED PELLETS ORAL at 20:39

## 2022-01-01 RX ADMIN — MELATONIN 5 MG TABLET 5 MG: at 23:30

## 2022-01-01 RX ADMIN — VALPROATE SODIUM 60 MG: 100 INJECTION, SOLUTION INTRAVENOUS at 21:13

## 2022-01-01 RX ADMIN — CEFEPIME HYDROCHLORIDE 2 G: 2 INJECTION, POWDER, FOR SOLUTION INTRAVENOUS at 02:58

## 2022-01-01 RX ADMIN — METRONIDAZOLE 500 MG: 500 INJECTION, SOLUTION INTRAVENOUS at 03:39

## 2022-01-01 RX ADMIN — DONEPEZIL HYDROCHLORIDE 5 MG: 5 TABLET, FILM COATED ORAL at 08:40

## 2022-01-01 RX ADMIN — DIVALPROEX SODIUM 125 MG: 125 CAPSULE, COATED PELLETS ORAL at 08:31

## 2022-01-01 RX ADMIN — DOCUSATE SODIUM 50 MG AND SENNOSIDES 8.6 MG 1 TABLET: 8.6; 5 TABLET, FILM COATED ORAL at 20:39

## 2022-01-01 RX ADMIN — ONDANSETRON 4 MG: 2 INJECTION INTRAMUSCULAR; INTRAVENOUS at 22:47

## 2022-01-01 RX ADMIN — ENOXAPARIN SODIUM 30 MG: 30 INJECTION SUBCUTANEOUS at 20:05

## 2022-01-01 RX ADMIN — LEVOFLOXACIN 750 MG: 5 INJECTION, SOLUTION INTRAVENOUS at 22:39

## 2022-01-01 RX ADMIN — DIVALPROEX SODIUM 125 MG: 125 CAPSULE, COATED PELLETS ORAL at 20:01

## 2022-01-01 RX ADMIN — ASPIRIN 81 MG: 81 TABLET, CHEWABLE ORAL at 09:28

## 2022-01-01 RX ADMIN — OLANZAPINE 5 MG: 5 TABLET, ORALLY DISINTEGRATING ORAL at 22:41

## 2022-01-01 RX ADMIN — DOCUSATE SODIUM 50 MG AND SENNOSIDES 8.6 MG 1 TABLET: 8.6; 5 TABLET, FILM COATED ORAL at 20:01

## 2022-01-01 RX ADMIN — ENOXAPARIN SODIUM 30 MG: 30 INJECTION SUBCUTANEOUS at 01:05

## 2022-01-01 RX ADMIN — CYCLOBENZAPRINE HYDROCHLORIDE 5 MG: 5 TABLET, FILM COATED ORAL at 12:24

## 2022-01-01 RX ADMIN — CITALOPRAM HYDROBROMIDE 20 MG: 20 TABLET ORAL at 10:29

## 2022-01-01 RX ADMIN — OLANZAPINE 10 MG: 5 TABLET, ORALLY DISINTEGRATING ORAL at 03:19

## 2022-01-01 RX ADMIN — ACETAMINOPHEN 325MG 650 MG: 325 TABLET ORAL at 08:49

## 2022-01-01 RX ADMIN — DONEPEZIL HYDROCHLORIDE 5 MG: 5 TABLET, FILM COATED ORAL at 12:23

## 2022-01-01 RX ADMIN — ASPIRIN 81 MG: 81 TABLET, CHEWABLE ORAL at 10:36

## 2022-01-01 RX ADMIN — DOCUSATE SODIUM 50 MG AND SENNOSIDES 8.6 MG 1 TABLET: 8.6; 5 TABLET, FILM COATED ORAL at 09:10

## 2022-01-01 RX ADMIN — MORPHINE SULFATE 1 MG: 2 INJECTION, SOLUTION INTRAMUSCULAR; INTRAVENOUS at 09:12

## 2022-01-01 RX ADMIN — ATORVASTATIN CALCIUM 10 MG: 10 TABLET, FILM COATED ORAL at 21:10

## 2022-01-01 RX ADMIN — CEFTRIAXONE SODIUM 1 G: 1 INJECTION, POWDER, FOR SOLUTION INTRAMUSCULAR; INTRAVENOUS at 14:48

## 2022-01-01 RX ADMIN — ENOXAPARIN SODIUM 30 MG: 30 INJECTION SUBCUTANEOUS at 12:00

## 2022-01-01 RX ADMIN — QUETIAPINE FUMARATE 50 MG: 50 TABLET ORAL at 10:29

## 2022-01-01 RX ADMIN — QUETIAPINE FUMARATE 50 MG: 50 TABLET ORAL at 16:06

## 2022-01-01 RX ADMIN — CITALOPRAM HYDROBROMIDE 20 MG: 20 TABLET ORAL at 08:40

## 2022-01-01 RX ADMIN — ATORVASTATIN CALCIUM 10 MG: 10 TABLET, FILM COATED ORAL at 20:39

## 2022-01-01 RX ADMIN — CITALOPRAM HYDROBROMIDE 20 MG: 20 TABLET ORAL at 08:31

## 2022-01-01 RX ADMIN — MORPHINE SULFATE 2 MG: 2 INJECTION, SOLUTION INTRAMUSCULAR; INTRAVENOUS at 13:05

## 2022-01-01 RX ADMIN — DONEPEZIL HYDROCHLORIDE 5 MG: 5 TABLET, FILM COATED ORAL at 08:31

## 2022-01-01 RX ADMIN — DIVALPROEX SODIUM 125 MG: 125 CAPSULE, COATED PELLETS ORAL at 10:29

## 2022-01-01 RX ADMIN — DONEPEZIL HYDROCHLORIDE 5 MG: 5 TABLET, FILM COATED ORAL at 10:36

## 2022-01-01 RX ADMIN — DIVALPROEX SODIUM 125 MG: 125 CAPSULE, COATED PELLETS ORAL at 08:40

## 2022-01-01 RX ADMIN — SULFAMETHOXAZOLE AND TRIMETHOPRIM 1 TABLET: 800; 160 TABLET ORAL at 08:31

## 2022-01-01 RX ADMIN — SULFAMETHOXAZOLE AND TRIMETHOPRIM 1 TABLET: 800; 160 TABLET ORAL at 20:39

## 2022-01-01 RX ADMIN — VANCOMYCIN HYDROCHLORIDE 1250 MG: 10 INJECTION, POWDER, LYOPHILIZED, FOR SOLUTION INTRAVENOUS at 04:58

## 2022-01-01 RX ADMIN — DONEPEZIL HYDROCHLORIDE 5 MG: 5 TABLET, FILM COATED ORAL at 10:29

## 2022-01-01 RX ADMIN — SODIUM CHLORIDE, POTASSIUM CHLORIDE, SODIUM LACTATE AND CALCIUM CHLORIDE: 600; 310; 30; 20 INJECTION, SOLUTION INTRAVENOUS at 05:10

## 2022-01-01 RX ADMIN — SULFAMETHOXAZOLE AND TRIMETHOPRIM 1 TABLET: 800; 160 TABLET ORAL at 19:47

## 2022-01-01 RX ADMIN — DOCUSATE SODIUM 50 MG AND SENNOSIDES 8.6 MG 1 TABLET: 8.6; 5 TABLET, FILM COATED ORAL at 09:28

## 2022-01-01 RX ADMIN — ASPIRIN 81 MG: 81 TABLET, CHEWABLE ORAL at 09:10

## 2022-01-01 RX ADMIN — LEVOFLOXACIN 750 MG: 5 INJECTION, SOLUTION INTRAVENOUS at 22:51

## 2022-01-01 RX ADMIN — ACETAMINOPHEN 325MG 650 MG: 325 TABLET ORAL at 19:47

## 2022-01-01 RX ADMIN — QUETIAPINE FUMARATE 50 MG: 50 TABLET ORAL at 23:27

## 2022-01-01 RX ADMIN — IPRATROPIUM BROMIDE AND ALBUTEROL SULFATE 3 ML: .5; 3 SOLUTION RESPIRATORY (INHALATION) at 01:20

## 2022-01-01 RX ADMIN — ACETAMINOPHEN 325MG 650 MG: 325 TABLET ORAL at 12:22

## 2022-01-01 RX ADMIN — Medication 2 DROP: at 10:43

## 2022-01-01 RX ADMIN — DOCUSATE SODIUM 50 MG AND SENNOSIDES 8.6 MG 2 TABLET: 8.6; 5 TABLET, FILM COATED ORAL at 08:44

## 2022-01-01 RX ADMIN — DIVALPROEX SODIUM 125 MG: 125 CAPSULE, COATED PELLETS ORAL at 19:47

## 2022-01-01 RX ADMIN — DOCUSATE SODIUM 50 MG AND SENNOSIDES 8.6 MG 2 TABLET: 8.6; 5 TABLET, FILM COATED ORAL at 19:47

## 2022-01-01 RX ADMIN — VALPROATE SODIUM 60 MG: 100 INJECTION, SOLUTION INTRAVENOUS at 00:14

## 2022-01-01 RX ADMIN — ASPIRIN 81 MG: 81 TABLET, CHEWABLE ORAL at 08:31

## 2022-01-01 RX ADMIN — QUETIAPINE FUMARATE 50 MG: 50 TABLET ORAL at 09:28

## 2022-01-01 RX ADMIN — QUETIAPINE FUMARATE 50 MG: 50 TABLET ORAL at 10:38

## 2022-01-01 RX ADMIN — ENOXAPARIN SODIUM 30 MG: 30 INJECTION SUBCUTANEOUS at 21:51

## 2022-01-01 RX ADMIN — ENOXAPARIN SODIUM 30 MG: 30 INJECTION SUBCUTANEOUS at 12:25

## 2022-01-01 RX ADMIN — QUETIAPINE FUMARATE 50 MG: 50 TABLET ORAL at 13:38

## 2022-01-01 RX ADMIN — POTASSIUM CHLORIDE 20 MEQ: 1.5 POWDER, FOR SOLUTION ORAL at 12:24

## 2022-01-01 ASSESSMENT — ACTIVITIES OF DAILY LIVING (ADL)
TOILETING_ISSUES: NO
HEARING_DIFFICULTY_OR_DEAF: NO
DOING_ERRANDS_INDEPENDENTLY_DIFFICULTY: YES
ADLS_ACUITY_SCORE: 41
ADLS_ACUITY_SCORE: 39
ADLS_ACUITY_SCORE: 35
EATING: 1-->ASSISTANCE (EQUIPMENT/PERSON) NEEDED
EQUIPMENT_CURRENTLY_USED_AT_HOME: WALKER, HEMI
ADLS_ACUITY_SCORE: 37
ADLS_ACUITY_SCORE: 41
WALKING_OR_CLIMBING_STAIRS: AMBULATION DIFFICULTY, REQUIRES EQUIPMENT
ADLS_ACUITY_SCORE: 41
ADLS_ACUITY_SCORE: 37
ADLS_ACUITY_SCORE: 37
TRANSFERRING: 1-->ASSISTANCE (EQUIPMENT/PERSON) NEEDED (NOT DEVELOPMENTALLY APPROPRIATE)
ADLS_ACUITY_SCORE: 41
ADLS_ACUITY_SCORE: 37
ADLS_ACUITY_SCORE: 41
WEAR_GLASSES_OR_BLIND: YES
CONCENTRATING,_REMEMBERING_OR_MAKING_DECISIONS_DIFFICULTY: YES
CHANGE_IN_FUNCTIONAL_STATUS_SINCE_ONSET_OF_CURRENT_ILLNESS/INJURY: YES
ADLS_ACUITY_SCORE: 39
WALKING_OR_CLIMBING_STAIRS_DIFFICULTY: YES
ADLS_ACUITY_SCORE: 41
FALL_HISTORY_WITHIN_LAST_SIX_MONTHS: YES
NUMBER_OF_TIMES_PATIENT_HAS_FALLEN_WITHIN_LAST_SIX_MONTHS: 5
TRANSFERRING: 1-->ASSISTANCE (EQUIPMENT/PERSON) NEEDED
ADLS_ACUITY_SCORE: 35
ADLS_ACUITY_SCORE: 39
SWALLOWING: 0-->SWALLOWS FOODS/LIQUIDS WITHOUT DIFFICULTY (DEVELOPMENTALLY APPROPRIATE)
ADLS_ACUITY_SCORE: 39
DRESSING/BATHING_DIFFICULTY: NO
VISION_MANAGEMENT: GLASSES
SWALLOWING: 0-->SWALLOWS FOODS/LIQUIDS WITHOUT DIFFICULTY
DIFFICULTY_EATING/SWALLOWING: NO
ADLS_ACUITY_SCORE: 35
ADLS_ACUITY_SCORE: 37
ADLS_ACUITY_SCORE: 35
ADLS_ACUITY_SCORE: 39
ADLS_ACUITY_SCORE: 35
ADLS_ACUITY_SCORE: 39
ADLS_ACUITY_SCORE: 41
EATING: 1-->ASSISTANCE (EQUIPMENT/PERSON) NEEDED (NOT DEVELOPMENTALLY APPROPRIATE)
ADLS_ACUITY_SCORE: 35

## 2022-01-01 ASSESSMENT — ENCOUNTER SYMPTOMS
VOMITING: 1
NAUSEA: 1

## 2022-01-06 NOTE — PROGRESS NOTES
[place] GERIATRIC SERVICES  [place] Medical Record Number:  [id number]  Place of Service where encounter took place:  THE [facility] ([facility]) [[id number]]  Chief Complaint   Patient presents with     RECHECK       HPI:    Debi Morton  is a 87 year old (11/10/1934), who is being seen today for an episodic care visit.  HPI information obtained from: facility chart records, facility staff, patient report, [facility] chart review and family/first contact daughter Marisol Morton report.   [place] to be closer to her family.  History of cognitive decline and MRI brain 1/27/2020 showed moderate volume loss with progressive atrophy and progressive chronic small vessel disease compared to 2012. She was seen by Dr Mirza at the [facility] 9/11/2020 who felt she likely has Alzheimer's type dementia. Neuropsychiatric testing was done 9/23/2020 and revealed deficits in attention, learning, memory and language. She had  MMSE 19/30 and CPT 4.5/5.6. Aricept  was started at follow up appt with Dr Mirza 10/7/2020.   She was hospitalized at [facility]  6/25/2021 after a fall resulting in a left intertrochanteric femur  fracture. She underwent IM nailing 6/26/2021 by Dr Mckenzie. Post op course was complicated by encephalopathy and urinary retention.   She discharged to [facility] tcu for rehab. She had Ortho follow up 8/4/2021 and was advanced to WBAT.   She returned to [facility] 8/17/2021 and moved from AL to Memory Care.   Other medical issues include HTN, osteoporosis, vitamin D deficiency.       Today's concern is:     Late onset Alzheimer's disease without behavioral disturbance (H)  Closed fracture of left hip with routine healing, subsequent encounter  S/P ORIF (open reduction internal fixation) fracture  Age-related osteoporosis with current pathological fracture with routine healing, subsequent  encounter  Vitamin D deficiency  Anemia due to blood loss, acute  Essential hypertension  Physical deconditioning   She is a poor historian. Pleasant and conversational. Comments about the cold and snowy weather. Reports feeling well.Remembers that she had a broken hip. Denies pain of any type. Up and about with her walker. Requires supervision to min assist with cares. Continues to resist bathing and changing her clothes. Appears clean and well groomed today. Enjoys activities on the unit.     Past Medical and Surgical History reviewed in Epic today.    MEDICATIONS:    Current Outpatient Medications   Medication Sig Dispense Refill     acetaminophen (TYLENOL) 325 MG tablet Take 3 tablets (975 mg) by mouth every 8 hours as needed for mild pain 60 tablet 0     alendronate (FOSAMAX) 70 MG tablet TAKE ONE TABLET BY MOUTH EVERY 7 DAYS 4 tablet 97     ASPIRIN LOW DOSE 81 MG chewable tablet TAKE 1 TABLET BY MOUTH EVERY MORNING 36 tablet PRN     atorvastatin (LIPITOR) 10 MG tablet TAKE 1 TABLET BY MOUTH ONCE DAILY 28 tablet PRN     CALCIUM ANTACID 500 MG chewable tablet TAKE 1 TABLET BY MOUTH ONCE DAILY 30 tablet 11     cyanocobalamin (VITAMIN B-12) 100 MCG tablet TAKE 1 TABLET BY MOUTH ONCE DAILY 31 tablet 11     donepezil (ARICEPT) 10 MG tablet TAKE 1 TABLET BY MOUTH ONCE DAILY 28 tablet PRN     hydrochlorothiazide (HYDRODIURIL) 25 MG tablet TAKE ONE TABLET BY MOUTH EVERY OTHER DAY 14 tablet PRN     Multiple Vitamins-Minerals (PRESERVISION AREDS) TABS TAKE 1 TABLET BY MOUTH ONCE DAILY 28 tablet PRN     polyethylene glycol (MIRALAX) 17 g packet Take 17 g by mouth daily 7 packet 0     potassium chloride ER (KLOR-CON M) 20 MEQ CR tablet TAKE 1 TABLET BY MOUTH ONCE DAILY 28 tablet PRN     quinapril (ACCUPRIL) 10 MG tablet TAKE 1 TABLET BY MOUTH ONCE DAILY 34 tablet PRN     vitamin D2 (ERGOCALCIFEROL) 35254 units (1250 mcg) capsule TAKE ONE CAPSULE BY MOUTH ONCE A WEEK 4 capsule PRN         REVIEW OF SYSTEMS:  Limited  secondary to cognitive impairment. Positives as noted under HPI    Objective:  Pulse 80   Temp 97.3  F (36.3  C)   Wt 53.4 kg (117 lb 11.2 oz)   BMI 19.00 kg/m    Exam:  GENERAL APPEARANCE:  Alert, in no distress, thin  ENT:  [place], oropharynx clear  EYES:  EOM normal, conjunctiva and lids normal  NECK: no adenopathy,masses or thyromegaly  RESP:  lungs clear to auscultation , no respiratory distress  CV:  regular rate and rhythm, no murmur, no edema, +2 pedal pulses  ABDOMEN:  soft, non-tender, no distension, no masses  M/S:   gait steady with walker. YANCEY with good strength. No joint inflammation  SKIN:  no rashes or open areas  PSYCH:  oriented to self, place, insight and judgement impaired, memory impaired , affect and mood normal    Labs:   Recent labs in [facility] reviewed by me today.     ASSESSMENT / PLAN:    (G30.1,  F02.80) Late onset Alzheimer's disease without behavioral disturbance (H)  Comment: progressive disease. LACLS-5 score 3.6/5.7 indicating moderate deficits. She has adjusted well to Memory Care. Enjoys activities and has had a desirable 4 lb weight gain. Per discussion with her daughter, the main issue continues to be resistiveness with bathing. Family is working with facility staff on this. No other behavior issues.   Plan: Memory Care staff assistance with cares, meals, mobility, activities and med admin. Continue donepezil.     (S72.002D) Closed fracture of left hip with routine healing, subsequent encounter  (primary encounter diagnosis)  (Z98.890,  Z87.81) S/P ORIF (open reduction internal fixation) fracture  Comment: s/p IM nailing 6/26/2021. She had Ortho follow up 9/15/2021 and XR showed stable alignment. Appears comfortable   Plan: continue tylenol. Up with walker. Ortho follow up prn     (M80.00XD) Age-related osteoporosis with current pathological fracture with routine healing, subsequent encounter  (E55.9) Vitamin D deficiency  Comment: fracture healed  Plan: continue fosamax, calcium,  vitamin D     (D62) Anemia due to blood loss, acute  Comment: Hgb 11.8 on 8/26/2021  Plan: recheck Hgb     (I10) Essential hypertension  Comment: variable control with recent readings: 160/81, 116/60  HR: 73-93   Quinapril dose was decreased while on tcu   Plan: continue quinapril, HCTZ. BMP next lab day. Avoid hypotension due to advanced age and fall risk.     (R53.81) Physical deconditioning  Comment: completed therapies and is up ad finn with walker        Electronically signed by:  CESAR Best CNP

## 2022-01-06 NOTE — LETTER
1/6/2022        RE: Debi Morton  [address] [address]        [place] [facility]  [place] Medical Record Number:  [id number]  Place of Service where encounter took place:  THE [facility] ([facility]) [[id number]]  Chief Complaint   Patient presents with     RECHECK       HPI:    Debi Morton  is a 87 year old (11/10/1934), who is being seen today for an episodic care visit.  HPI information obtained from: facility chart records, facility staff, patient report, [facility] chart review and family/first contact daughter Marisol Morton report.   [place] moved [place] from [place] in 9[place] to be closer to her family.  History of cognitive decline and MRI brain 1/27/2020 showed moderate volume loss with progressive atrophy and progressive chronic small vessel disease compared to 2012. She was seen by Dr Mirza at the [facility] 9/11/2020 who felt she likely has Alzheimer's type dementia. Neuropsychiatric testing was done 9/23/2020 and revealed deficits in attention, learning, memory and language. She had  MMSE 19/30 and CPT 4.5/5.6. Aricept  was started at follow up appt with Dr Mirza 10/7/2020.   She was hospitalized at [facility]  6/25/2021 after a fall resulting in a left intertrochanteric femur  fracture. She underwent IM nailing 6/26/2021 by Dr Mckenzie. Post op course was complicated by encephalopathy and urinary retention.   She discharged to [facility] tcu for rehab. She had Ortho follow up 8/4/2021 and was advanced to WBAT.   She returned to [facility] 8/17/2021 and moved from [place] to Memory Care.   Other medical issues include HTN, osteoporosis, vitamin D deficiency.       Today's concern is:     Late onset Alzheimer's disease without behavioral disturbance (H)  Closed fracture of left hip with routine healing, subsequent encounter  S/P ORIF (open reduction internal  fixation) fracture  Age-related osteoporosis with current pathological fracture with routine healing, subsequent encounter  Vitamin D deficiency  Anemia due to blood loss, acute  Essential hypertension  Physical deconditioning   She is a poor historian. Pleasant and conversational. Comments about the cold and snowy weather. Reports feeling well.Remembers that she had a broken hip. Denies pain of any type. Up and about with her walker. Requires supervision to min assist with cares. Continues to resist bathing and changing her clothes. Appears clean and well groomed today. Enjoys activities on the unit.     Past Medical and Surgical History reviewed in Epic today.    MEDICATIONS:    Current Outpatient Medications   Medication Sig Dispense Refill     acetaminophen (TYLENOL) 325 MG tablet Take 3 tablets (975 mg) by mouth every 8 hours as needed for mild pain 60 tablet 0     alendronate (FOSAMAX) 70 MG tablet TAKE ONE TABLET BY MOUTH EVERY 7 DAYS 4 tablet 97     ASPIRIN LOW DOSE 81 MG chewable tablet TAKE 1 TABLET BY MOUTH EVERY MORNING 36 tablet PRN     atorvastatin (LIPITOR) 10 MG tablet TAKE 1 TABLET BY MOUTH ONCE DAILY 28 tablet PRN     CALCIUM ANTACID 500 MG chewable tablet TAKE 1 TABLET BY MOUTH ONCE DAILY 30 tablet 11     cyanocobalamin (VITAMIN B-12) 100 MCG tablet TAKE 1 TABLET BY MOUTH ONCE DAILY 31 tablet 11     donepezil (ARICEPT) 10 MG tablet TAKE 1 TABLET BY MOUTH ONCE DAILY 28 tablet PRN     hydrochlorothiazide (HYDRODIURIL) 25 MG tablet TAKE ONE TABLET BY MOUTH EVERY OTHER DAY 14 tablet PRN     Multiple Vitamins-Minerals (PRESERVISION AREDS) TABS TAKE 1 TABLET BY MOUTH ONCE DAILY 28 tablet PRN     polyethylene glycol (MIRALAX) 17 g packet Take 17 g by mouth daily 7 packet 0     potassium chloride ER (KLOR-CON M) 20 MEQ CR tablet TAKE 1 TABLET BY MOUTH ONCE DAILY 28 tablet PRN     quinapril (ACCUPRIL) 10 MG tablet TAKE 1 TABLET BY MOUTH ONCE DAILY 34 tablet PRN     vitamin D2 (ERGOCALCIFEROL) 18056 units  (1250 mcg) capsule TAKE ONE CAPSULE BY MOUTH ONCE A WEEK 4 capsule PRN         REVIEW OF SYSTEMS:  Limited secondary to cognitive impairment. Positives as noted under HPI    Objective:  Pulse 80   Temp 97.3  F (36.3  C)   Wt 53.4 kg (117 lb 11.2 oz)   BMI 19.00 kg/m    Exam:  GENERAL APPEARANCE:  Alert, in no distress, thin  ENT:  [place], oropharynx clear  EYES:  EOM normal, conjunctiva and lids normal  NECK: no adenopathy,masses or thyromegaly  RESP:  lungs clear to auscultation , no respiratory distress  CV:  regular rate and rhythm, no murmur, no edema, +2 pedal pulses  ABDOMEN:  soft, non-tender, no distension, no masses  M/S:   gait steady with walker. YANCEY with good strength. No joint inflammation  SKIN:  no rashes or open areas  PSYCH:  oriented to self, place, insight and judgement impaired, memory impaired , affect and mood normal    Labs:   Recent labs in [facility] reviewed by me today.     ASSESSMENT / PLAN:    (G30.1,  F02.80) Late onset Alzheimer's disease without behavioral disturbance (H)  Comment: progressive disease. LACLS-5 score 3.6/5.7 indicating moderate deficits. She has adjusted well to Memory Care. Enjoys activities and has had a desirable 4 lb weight gain. Per discussion with her daughter, the main issue continues to be resistiveness with bathing. Family is working with facility staff on this. No other behavior issues.   Plan: Memory Care staff assistance with cares, meals, mobility, activities and med admin. Continue donepezil.     (S72.002D) Closed fracture of left hip with routine healing, subsequent encounter  (primary encounter diagnosis)  (Z98.890,  Z87.81) S/P ORIF (open reduction internal fixation) fracture  Comment: s/p IM nailing 6/26/2021. She had Ortho follow up 9/15/2021 and XR showed stable alignment. Appears comfortable   Plan: continue tylenol. Up with walker. Ortho follow up prn     (M80.00XD) Age-related osteoporosis with current pathological fracture with routine healing,  subsequent encounter  (E55.9) Vitamin D deficiency  Comment: fracture healed  Plan: continue fosamax, calcium, vitamin D     (D62) Anemia due to blood loss, acute  Comment: Hgb 11.8 on 8/26/2021  Plan: recheck Hgb     (I10) Essential hypertension  Comment: variable control with recent readings: 160/81, 116/60  HR: 73-93   Quinapril dose was decreased while on tcu   Plan: continue quinapril, HCTZ. BMP next lab day. Avoid hypotension due to advanced age and fall risk.     (R53.81) Physical deconditioning  Comment: completed therapies and is up ad finn with walker        Electronically signed by:  CESAR Best CNP                 Sincerely,        CESAR Best CNP

## 2022-01-12 NOTE — PROGRESS NOTES
"The author of this note documented a reason for not sharing it with the patient.    Counseling Progress Note    Client Name: Maile Cooper    Date of Service (when I saw the patient): 01/12/2022    Service Type: Individual Therapy    Session Start Time: 1:00pm   Session End Time: 2:00pm  Session Length: I spent 60 minutes performing psychotherapy during this office visit.  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    Attendees: Client    Health Maintenance Topics with due status: Overdue       Topic Date Due    VARICELLA IMMUNIZATION 05/27/2010    HPV IMMUNIZATION 01/16/2017    MENINGITIS IMMUNIZATION 01/16/2017    DTAP/TDAP/TD IMMUNIZATION 01/16/2017    INFLUENZA VACCINE 09/01/2020       Treatment Plan Due Date: Reviewed 03/14/2022  Diagnostic Assessment Due Date: 12/14/2022    DATA    Treatment Objective(s) Addressed in This Session: Decrease anxiety/worry/intrusive thoughts and decrease subsequent depression, improve interpersonal relationships, effective communication, improve self-confidence & self-esteem, develop thoughts and beliefs that promote a strong sense of well being.     Progress on / Status of Treatment Objective(s) / Homework: Maile talked about missing [employer] practice because of being ill and arguing with her dad about it. Her dad threatened to remove her from class but decided to let her continue. She reports struggles with going stating that she does not \"feel\" like it but typically enjoys it when she goes. Maile talked about it feeling like a good environment. We discussed this idea of her being sensitive to her surroundings. Began reading an article about Sensitivity as a Super Power. This lead to discussion about her future and a statement her mom made about her not being able to hold a job due to her depression. Agreed to talk more about the article next " PACU to Inpatient Nursing Handoff    Patient Debi Morton is a 86 year old female who speaks English.   Procedure Procedure(s):  OPEN REDUCTION INTERNAL FIXATION, FRACTURE, FEMUR, USING INTRAMEDULLARY MICHELINE   Surgeon(s) Primary: Elizabeth Mckenzie MD  Resident - Assisting: Bud Branch MD     Allergies   Allergen Reactions     Penicillins Unknown       Isolation  No active isolations     Past Medical History   has a past medical history of Dementia (H), Gastroesophageal reflux disease, Hypercalcemia, Hypertension, Lipidemia, Osteoporosis, Venous insufficiency, and Vitamin D deficiency.    Anesthesia General   Dermatome Level     Preop Meds Not applicable   Nerve block Not applicable   Intraop Meds dexamethasone (Decadron)  fentanyl (Sublimaze): 100 mcg total  hydromorphone (Dilaudid): 0.5 mg total  ondansetron (Zofran): last given at 1050   Local Meds onQ pump   Antibiotics cefazolin (Ancef) - last given at 0904     Pain Patient Currently in Pain: denies   PACU meds  Not applicable   PCA / epidural No   Capnography     Telemetry ECG Rhythm: Normal sinus rhythm   Inpatient Telemetry Monitor Ordered? No        Labs Glucose Lab Results   Component Value Date     06/25/2021       Hgb Lab Results   Component Value Date    HGB 10.7 06/25/2021       INR Lab Results   Component Value Date    INR 1.03 06/25/2021      PACU Imaging Not applicable     Wound/Incision Incision/Surgical Site 06/26/21 Left Hip (Active)   Incision Assessment [facility] 06/26/21 1158   Closure SHRADDHA 06/26/21 1158   Dressing Intervention Clean, dry, intact 06/26/21 1158   Number of days: 0       Incision/Surgical Site 06/26/21 Left;Upper;Lateral Leg (Active)   Incision Assessment [facility] 06/26/21 1158   Closure SHRADDHA 06/26/21 1158   Dressing Intervention Clean, dry, intact 06/26/21 1158   Number of days: 0       Incision/Surgical Site 06/26/21 Left;Lateral Knee (Active)   Incision Assessment [facility] 06/26/21 1158   Closure SHRADDHA 06/26/21 1158    Dressing Intervention Clean, dry, intact 06/26/21 1158   Number of days: 0      CMS        Equipment Not applicable   Other LDA       IV Access Peripheral IV 06/26/21 Left Hand (Active)   Site Assessment [facility] 06/26/21 1200   Line Status Saline locked 06/26/21 1200   Phlebitis Scale 0-->no symptoms 06/26/21 1200   Number of days: 0       Peripheral IV 06/26/21 Right Upper forearm (Active)   Site Assessment [facility] 06/26/21 1200   Line Status Infusing 06/26/21 1200   Phlebitis Scale 0-->no symptoms 06/26/21 1200   Number of days: 0       Peripheral Nerve Block Catheter 06/25/21 Fascia iliaca left (Active)   Catheter site assessment [facility] 06/26/21 1138   Dressing assessment and intervention Dressing dry and intact 06/26/21 1138   Number of days: 1      Blood Products Not applicable  mL   Intake/Output Date 06/26/21 0700 - 06/27/21 0659   Shift 7624-7808 7637-4781 6914-6763 24 Hour Total   INTAKE   P.O. 40   40   I.V. 1800   1800   Shift Total(mL/kg) 1840(25.35)   1840(25.35)   OUTPUT   Urine 150   150   Blood 100   100   Shift Total(mL/kg) 250(3.44)   250(3.44)   Weight (kg) 72.57 72.57 72.57 72.57      Drains / [place] Urethral Catheter 16 fr (Active)   Tube Description Positional 06/26/21 0025   Collection Container Patent 06/26/21 1138   Securement Method Securing device (Describe) 06/26/21 1138   Rationale for Continued Use Other (Comment) 06/26/21 0715   Urine Output 500 mL 06/25/21 2246   Number of days: 1      Time of void PreOp Void Prior to Procedure: 0700 (06/26/21 0715)    PostOp Voided (mL): 10 mL (06/25/21 1900)    Diapered? No   Bladder Scan Bladder Scan Volume (mL): 552 ml (06/25/21 1900)   PO 40 mL (06/26/21 1200)  water     Vitals    B/P: 132/63  T: 97.2  F (36.2  C)    Temp src: Temporal  P:  Pulse: 67 (06/26/21 1215)          R: 10  O2:  SpO2: 98 %    O2 Device: None (Room air) (06/26/21 1215)    Oxygen Delivery: 6 LPM (06/26/21 1138)         Family/support present significant other and daughter and  "session and also to teach / review coping strategies for depression.     Intervention: Read, taught, discussed an article about Sensitivity as a Super Power - called \"7 Reasons Why Being Highly Sensitive is a Super Power\". Got to reason #2 \"Reflective\" this lead to a discussion about employment. Maile shared that her mom told her she could not hold a job because of her depression. Maile was able to express how upsetting this was to her and how she begins to then believe this herself. Validated Maile's feelings. Allowed space for her to process and talk through her feelings and thoughts about this. Maile talked more about her concerns for her future and not knowing how to choose career / job goals. She also talked about how her parents yelling causes her to shut down and feel like she does not want a relationship with them in the future. This clinician highlighted her tendency toward negative thinking and gave examples of her describing different potential paths and why they wont work. Discussed a direction for next session. Plan is to finish reading about Sensitivity as a Super Power, teach/review coping strategies for depression and talk more about future planning / goal setting.      Current Stressors / Issues: Continued depressed mood, relational difficulties with her parents    Medication Review: Not on any medications    Medication Compliance: No medications at this time, she does take a multi-vitamin    Changes in Health: None noted    Chemical Use Review: None  Substance Use: No    Tobacco Use: No    ASSESSMENT: Current Emotional / Mental Status (status of significant symptoms):  Risk status: no suicidal ideation  Client denies current fears or concerns for personal safety.  A safety and risk management plan has not been developed at this time; however client was given the after-hours number should there be a change in any of these risk factors.    Appearance: awake, alert  Eye Contact: good   Psychomotor " "Behavior: no evidence of tardive dyskinesia, dystonia, or tics and fidgeting  Attitude: cooperative, guarded, fearful  Orientation: time, person, and place  Speech: clear, coherent  Rate / Production: Normal  Volume: Normal  Mood: Calm  Affect: appropriate and in normal range and mood congruent  Thought Content: no evidence of psychotic thought, active suicidal ideation present, no auditory hallucinations present and no visual hallucinations present  Thought Form: Logical, Linear and Goal directed  Insight: good    Collateral Reports Completed: PHQ-A & DIANE-7    PLAN: Continue weekly therapy session. Continue to explore ways to utilize her feeling very deeply as a strength. Help her to see this as a superpower and ways that avoiding feeling also limits her from feeling the \"good\" feelings & continue with plan as highlighted above.       Landon Ruelas, [facility]  " son here   Patient belongings     Patient transported on bed   DC meds/scripts (obs/outpt) Not applicable   Inpatient Pain Meds Released? No       Special needs/considerations None   Tasks needing completion None       VICKI SORIA RN  ASCOM [id number]

## 2022-02-21 NOTE — TELEPHONE ENCOUNTER
[facility] Geriatrics Triage Nurse Telephone Encounter    Provider: CESAR Swann CNP   Facility: [facility]  Facility Type:  [place]    Caller: Diana   Call Back Number: [phone]    Allergies:    Allergies   Allergen Reactions     Penicillins Anaphylaxis     Penicillins Unknown        Reason for call:   Nursing is calling to report that the pt has some green discharge from the Rt eye, both eyes are watery and have red conjunctival sacs. The pt denies itching but has been seen rubbing her eyes. The pt is saying that she doesn't want any drops at this time and feels that it will get better on its own. Nursing wanted you to be aware.       Verbal Order/Direction given by Provider:She has dementia and is a poor historian.Sounds like conjunctivitis and needs to be treated. I'll send a script to the pharmacy for Ocuflox 1 drop to both eyes qid X 7 days.       Provider giving Order:  CESAR Swann CNP     Verbal Order given to: Diana Rajput RN

## 2022-02-23 NOTE — PROGRESS NOTES
[facility] GERIATRICS    Chief Complaint   Patient presents with     RECHECK     HPI:  Debi Morton is a 87 year old  (11/10/1934), who is being seen today for an episodic care visit at: THE [facility] ([facility]) [[id number]].   HPI information obtained from: facility chart records, facility staff, patient report, [facility] chart review and family/first contact daughter Marisol Morton report.   [place] moved [place] the [place] at this facility [place] in 9/2020 to be closer to family.  History of cognitive decline and MRI brain 1/27/2020 showed moderate volume loss with progressive atrophy and progressive chronic small vessel disease compared to 2012. She was seen by Dr Mirza at the [facility] 9/11/2020 who felt she likely has Alzheimer's type dementia. Neuropsychiatric testing was done 9/23/2020 and revealed deficits in attention, learning, memory and language. She had  MMSE 19/30 and CPT 4.5/5.6. Aricept  was started at follow up appt with Dr Mirza 10/7/2020.   She was hospitalized at [facility]  6/25/2021 after a fall resulting in a left intertrochanteric femur  fracture. She underwent IM nailing 6/26/2021 by Dr Mckenzie. Post op course was complicated by encephalopathy and urinary retention. She discharged to [facility] tcu for rehab. She had Ortho follow up 8/4/2021 and was advanced to WBAT.   She moved to Memory Care upon her return 8/17/2021.   Other medical issues include HTN, osteoporosis, vitamin D deficiency.    Today's concern is:      Conjunctivitis of both eyes, unspecified conjunctivitis type  Late onset Alzheimer's disease without behavioral disturbance (H)  Closed fracture of left hip with routine healing, subsequent encounter  S/P ORIF (open reduction internal fixation) fracture  Age-related osteoporosis with current pathological fracture with routine healing, subsequent encounter  Vitamin D deficiency  Essential hypertension   She is seen today  after the nurse called 2/21/2022 reporting conjunctivitis of both eyes. Ocuflox was started. Nurses report her eyes have improved.   She is a poor historian. Reports itching and watering of both eyes. Denies pain or a change in her vision. Reports feeling well. Good appetite. Ambulating about the unit with her walker and returned to her room with direction. Pleasant and cooperative. Participated in activities. Nurses report she continues to be resistive with bathing and changing clothes, becoming very agitated at times.       Allergies, and PMH/PSH reviewed in EPIC today    REVIEW OF SYSTEMS:  Limited secondary to cognitive impairment. Positives as noted above.     Objective:   /88   Pulse 80   Temp 97.2  F (36.2  C)   Resp 19   Wt 53.4 kg (117 lb 11.2 oz)   BMI 19.00 kg/m    GENERAL APPEARANCE:  Alert, in no distress, thin  ENT:  [place], oropharynx clear  EYES: erythema and watering of both eyes, thick yellowish drainage right eye with mild edema or ectropion of right lower eyelid   NECK: no adenopathy,masses or thyromegaly  RESP:  lungs clear to auscultation , no respiratory distress  CV:  regular rate and rhythm, no murmur, no edema, +2 pedal pulses  ABDOMEN:  soft, non-tender, no distension, no masses  M/S:   gait steady with walker. YANCEY with good strength. No joint inflammation  SKIN:  no rashes or open areas  PSYCH:  oriented to self, place, insight and judgement impaired, memory impaired , affect and mood normal    Recent labs in [facility] reviewed by me today.     ASSESSMENT / PLAN:  (H10.9) Conjunctivitis of both eyes, unspecified conjunctivitis type  (primary encounter diagnosis)  Comment: acute infection. Frequently rubbing her eyes today. Erythema and drainage improved, per nurse report   Plan: complete 7 day course of Ocuflox. Good hygiene. Closely monitor symptoms   Discussed with her daughter Marisol Morton.     (G30.1,  F02.80) Late onset Alzheimer's disease without behavioral disturbance  (H)  Comment: moderate deficits with LACLS-5 score 3.6/5.7. Daughter concerned about ongoing bathing and hygiene issues. Note that patient appears clean and well groomed today.   Plan: continue donepezil. Memory Care staff assistance with cares, meals,activities and med admin. Daughter's concerns discussed with staff.     (S72.002D) Closed fracture of left hip with routine healing, subsequent encounter  (Z98.890,  Z87.81) S/P ORIF (open reduction internal fixation) fracture  Comment: s/p IM nailing 6/26/2021. Last Ortho follow up was 9/15/2021 and XR showed stable alignment. Appears comfortable with cares and mobility  Plan: continue tylenol. Ortho follow up prn      (M80.00XD) Age-related osteoporosis with current pathological fracture with routine healing, subsequent encounter  (E55.9) Vitamin D deficiency  Comment: remains ambulatory. No falls reported   Plan: continue fosamax, calcium, vitamin D. Plan to check vitamin D level at next visit and change to maintenance daily dose.     (I10) Essential hypertension  Comment: controlled with recent /88 HR 80  Quinapril was decreased during her tcu stay summer 2021.   Creatinine 0.73, K 4.1 on 1/13/2022  Plan: continue quinapril, HCTZ. Follow BMP. Avoid hypotension due to advanced age and fall risk         Electronically signed by: CESAR Best CNP

## 2022-02-24 NOTE — LETTER
2/24/2022        RE: Debi Morton  [facility]  [address]  [address]  [place] [address]        [facility] GERIATRICS    Chief Complaint   Patient presents with     RECHECK     HPI:  Debi Morton is a 87 year old  (11/10/1934), who is being seen today for an episodic care visit at: THE [facility] ([facility]) [[id number]].   HPI information obtained from: facility chart records, facility staff, patient report, [facility] chart review and family/first contact daughter Marisol Morton report.   She moved to the AL at this facility from [place] in 9/2020 to be closer to family.  History of cognitive decline and MRI brain 1/27/2020 showed moderate volume loss with progressive atrophy and progressive chronic small vessel disease compared to 2012. She was seen by Dr Mirza at the [facility] 9/11/2020 who felt she likely has Alzheimer's type dementia. Neuropsychiatric testing was done 9/23/2020 and revealed deficits in attention, learning, memory and language. She had  MMSE 19/30 and CPT 4.5/5.6. Aricept  was started at follow up appt with Dr Mirza 10/7/2020.   She was hospitalized at [facility]  6/25/2021 after a fall resulting in a left intertrochanteric femur  fracture. She underwent IM nailing 6/26/2021 by Dr Mckenzie. Post op course was complicated by encephalopathy and urinary retention. She discharged to [facility] tcu for rehab. She had Ortho follow up 8/4/2021 and was advanced to WBAT.   She moved to Memory Care upon her return 8/17/2021.   Other medical issues include HTN, osteoporosis, vitamin D deficiency.    Today's concern is:      Conjunctivitis of both eyes, unspecified conjunctivitis type  Late onset Alzheimer's disease without behavioral disturbance (H)  Closed fracture of left hip with routine healing, subsequent encounter  S/P ORIF (open reduction internal fixation) fracture  Age-related osteoporosis with current  pathological fracture with routine healing, subsequent encounter  Vitamin D deficiency  Essential hypertension   She is seen today after the nurse called 2/21/2022 reporting conjunctivitis of both eyes. Ocuflox was started. Nurses report her eyes have improved.   She is a poor historian. Reports itching and watering of both eyes. Denies pain or a change in her vision. Reports feeling well. Good appetite. Ambulating about the unit with her walker and returned to her room with direction. Pleasant and cooperative. Participated in activities. Nurses report she continues to be resistive with bathing and changing clothes, becoming very agitated at times.       Allergies, and PMH/PSH reviewed in EPIC today    REVIEW OF SYSTEMS:  Limited secondary to cognitive impairment. Positives as noted above.     Objective:   /88   Pulse 80   Temp 97.2  F (36.2  C)   Resp 19   Wt 53.4 kg (117 lb 11.2 oz)   BMI 19.00 kg/m    GENERAL APPEARANCE:  Alert, in no distress, thin  ENT:  [place], oropharynx clear  EYES: erythema and watering of both eyes, thick yellowish drainage right eye with mild edema or ectropion of right lower eyelid   NECK: no adenopathy,masses or thyromegaly  RESP:  lungs clear to auscultation , no respiratory distress  CV:  regular rate and rhythm, no murmur, no edema, +2 pedal pulses  ABDOMEN:  soft, non-tender, no distension, no masses  M/S:   gait steady with walker. YANCEY with good strength. No joint inflammation  SKIN:  no rashes or open areas  PSYCH:  oriented to self, place, insight and judgement impaired, memory impaired , affect and mood normal    Recent labs in [facility] reviewed by me today.     ASSESSMENT / PLAN:  (H10.9) Conjunctivitis of both eyes, unspecified conjunctivitis type  (primary encounter diagnosis)  Comment: acute infection. Frequently rubbing her eyes today. Erythema and drainage improved, per nurse report   Plan: complete 7 day course of Ocuflox. Good hygiene. Closely monitor symptoms    Discussed with her daughter Marisol Morton.     (G30.1,  F02.80) Late onset Alzheimer's disease without behavioral disturbance (H)  Comment: moderate deficits with LACLS-5 score 3.6/5.7. Daughter concerned about ongoing bathing and hygiene issues. Note that patient appears clean and well groomed today.   Plan: continue donepezil. Memory Care staff assistance with cares, meals,activities and med admin. Daughter's concerns discussed with staff.     (S72.002D) Closed fracture of left hip with routine healing, subsequent encounter  (Z98.890,  Z87.81) S/P ORIF (open reduction internal fixation) fracture  Comment: s/p IM nailing 6/26/2021. Last Ortho follow up was 9/15/2021 and XR showed stable alignment. Appears comfortable with cares and mobility  Plan: continue tylenol. Ortho follow up prn      (M80.00XD) Age-related osteoporosis with current pathological fracture with routine healing, subsequent encounter  (E55.9) Vitamin D deficiency  Comment: remains ambulatory. No falls reported   Plan: continue fosamax, calcium, vitamin D. Plan to check vitamin D level at next visit and change to maintenance daily dose.     (I10) Essential hypertension  Comment: controlled with recent /88 HR 80  Quinapril was decreased during her tcu stay summer 2021.   Creatinine 0.73, K 4.1 on 1/13/2022  Plan: continue quinapril, HCTZ. Follow BMP. Avoid hypotension due to advanced age and fall risk         Electronically signed by: CESAR Best CNP             Sincerely,        CESAR Best CNP

## 2022-03-03 NOTE — LETTER
"    3/3/2022        RE: Debi Morton  [facility]  [address]  [address]  [facility] [id number] [place], [place] [address]    Chief Complaint   Patient presents with     RECHECK     HPI:  Debi Morton is a 87 year old  (11/10/1934), who is being seen today for an episodic care visit at: THE [facility] ([facility]) [[id number]].     Today's concern is:   Conjunctivitis of both eyes, unspecified conjunctivitis type   She is seen today to follow up on conjunctivitis of both eyes. Ocuflox was completed 2/28/2022 but both eyes remain red and nurses have noticed her rubbing her eyes. She is a poor historian secondary to dementia. Reports her eyes \"itch and water.\"         Allergies, and PMH/PSH reviewed in [facility] today.  REVIEW OF SYSTEMS:  Limited secondary to cognitive impairment. Positives as noted under HPI    Objective:   /88   Pulse 80   Temp 97.1  F (36.2  C)   Resp 19   Wt 53.4 kg (117 lb 11.2 oz)   BMI 19.00 kg/m    GENERAL APPEARANCE:  Alert, in no distress  EYES: erythema and watery discharge of both eyes, mild edema of right lower lid    Recent labs in [facility] reviewed by me today.     ASSESSMENT / PLAN:  (H10.9) Conjunctivitis of both eyes, unspecified conjunctivitis type  (primary encounter diagnosis)  Comment: some improvement after a course of ocuflox, but not resolved.   Plan: tobradex for 7 days. Closely monitor symptoms. Refer to Ophthalmology if no improvement         Electronically signed by: CESAR Best CNP             Sincerely,        CESAR Best CNP    "

## 2022-03-03 NOTE — PROGRESS NOTES
"[facility] GERIATRICS    Chief Complaint   Patient presents with     RECHECK     HPI:  Debi Morton is a 87 year old  (11/10/1934), who is being seen today for an episodic care visit at: [facility]) [[id number]].     Today's concern is:   Conjunctivitis of both eyes, unspecified conjunctivitis type   She is seen today to follow up on conjunctivitis of both eyes. Ocuflox was completed 2/28/2022 but both eyes remain red and nurses have noticed her rubbing her eyes. She is a poor historian secondary to dementia. Reports her eyes \"itch and water.\"         Allergies, and PMH/PSH reviewed in [facility] today.  REVIEW OF SYSTEMS:  Limited secondary to cognitive impairment. Positives as noted under HPI    Objective:   /88   Pulse 80   Temp 97.1  F (36.2  C)   Resp 19   Wt 53.4 kg (117 lb 11.2 oz)   BMI 19.00 kg/m    GENERAL APPEARANCE:  Alert, in no distress  EYES: erythema and watery discharge of both eyes, mild edema of right lower lid    Recent labs in [facility] reviewed by me today.     ASSESSMENT / PLAN:  (H10.9) Conjunctivitis of both eyes, unspecified conjunctivitis type  (primary encounter diagnosis)  Comment: some improvement after a course of ocuflox, but not resolved.   Plan: tobradex for 7 days. Closely monitor symptoms. Refer to Ophthalmology if no improvement         Electronically signed by: CESAR Best CNP       "

## 2022-03-16 NOTE — PROGRESS NOTES
[facility] GERIATRICS    Chief Complaint   Patient presents with     RECHECK     HPI:  Debi Morton is a 87 year old  (11/10/1934), who is being seen today for an episodic care visit at: THE [facility] ([facility]) [[id number]].   She moved to the AL at this facility from [place] in 9/2020 to be closer to family.  History of cognitive decline and MRI brain 1/27/2020 showed moderate volume loss with progressive atrophy and progressive chronic small vessel disease compared to 2012. She was seen by Dr Mirza at the [facility] 9/11/2020 who felt she likely has Alzheimer's type dementia. Neuropsychiatric testing was done 9/23/2020 and revealed deficits in attention, learning, memory and language. She had  MMSE 19/30 and CPT 4.5/5.6. Aricept  was started at follow up appt with Dr Mirza 10/7/2020.   She was hospitalized at [facility]  6/25/2021 after a fall resulting in a left intertrochanteric femur  fracture. She underwent IM nailing 6/26/2021 by Dr Mckenzie. Post op course was complicated by encephalopathy and urinary retention. She discharged to [facility] tcu for rehab.   She moved to Memory Care upon her return 8/17/2021.   Other medical issues include HTN, osteoporosis, vitamin D deficiency.    Today's concern is:      Late onset Alzheimer's dementia with behavioral disturbance (H)  Dementia with psychosis (H)  Conjunctivitis of both eyes, unspecified conjunctivitis type  Essential hypertension  Age-related osteoporosis without current pathological fracture  Vitamin D deficiency  She is seen today after nurses report increased irritability, agitation, aggression and refusal of cares. Bathing and changing clothes has always been an issue, but she has become increasingly aggressive with any attempt to assist with hygiene. She has also become verbally abusive towards other residents and is creating a disturbance on the unit.   She is in her room today, irritable  "and not cooperative. Yells \"get out.\"   She has been afebrile with no s/s of acute illness or pain.     Allergies, and PMH/PSH reviewed in EPIC today    REVIEW OF SYSTEMS:  Unobtainable secondary to cognitive impairment.     Objective:   /77   Pulse 69   Temp 97.1  F (36.2  C)   Resp 16   Wt 53.4 kg (117 lb 11.2 oz)   BMI 19.00 kg/m    GENERAL APPEARANCE:  Alert, in no distress, thin. Uncooperative   ENT:  [place], oropharynx clear  EYES: sclera both eyes slightly pink, no drainage  RESP:  no respiratory distress  CV:  no edema   M/S:   gait steady with walker. YANCEY with good strength. No joint inflammation  PSYCH:  oriented to self,  insight and judgement impaired, memory impaired, irritable     Recent labs in [facility] reviewed by me today.     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 Staff unable to provide cares due to aggression.   Discussed at length with her son Perfecto Morton, who reports she has \"always had high anxiety.\" We discussed starting low dose seroquel. Also discussed tapering and discontinuing donepezil due to lack of efficacy in advanced dementia and potential side effects, including anxiety and depression. He wants to discuss with his siblings before making any changes.   Plan: continue donepezil. Memory Care staff assistance with cares, meals, activities and med admin.   Discussed with nurses.   Addendum: son called back 3/18/2022 asking to start low dose seroquel. Care conference scheduled for next week.     (H10.9) Conjunctivitis of both eyes, unspecified conjunctivitis type  Comment: infection did not resolve with a course of Ocuflox, now appears resolved after tobradex   Plan: monitor for recurrence    (I10) Essential hypertension  Comment: " controlled with BPs: 137/77, 135/88  HR: 69-80  Creatinine 0.73, K 4.1 on 1/13/2022  Plan: continue quinapril, HCTZ. Follow BMP.     (M81.0) Age-related osteoporosis without current pathological fracture  (E55.9) Vitamin D deficiency  Comment: history of left hip fracture, s/p IM nail 6/26/2021. She is up ad finn with walker.   Plan: continue fosamax, calcium and vitamin D-plan to change to daily maintenance dose in the near future        Total time spent with patient visit at the AL facility was 45 mins including patient visit, review of past records and 23 min phone call with her son. Greater than 50% of total time spent in counseling and coordinating care due to progressive dementia with psychosis and aggressive behavior. Counseling son re: progressive nature of dementia, medications and potential side effects, rationale for seroquel, plan of care. Coordinating the following with facility staff: medication orders, behavior management and plan of care.       Electronically signed by: CESAR Best CNP

## 2022-03-17 NOTE — LETTER
3/17/2022        RE: Debi Morton  [facility]  [address]  [address]  [facility] [id number] [place] [address] GERIATRICS    Chief Complaint   Patient presents with     RECHECK     HPI:  Dbei Morton is a 87 year old  (11/10/1934), who is being seen today for an episodic care visit at: THE [facility] ([facility]) [[id number]].   She moved to the AL at this facility from [place] in 9/2020 to be closer to family.  History of cognitive decline and MRI brain 1/27/2020 showed moderate volume loss with progressive atrophy and progressive chronic small vessel disease compared to 2012. She was seen by Dr Mirza at the [facility] 9/11/2020 who felt she likely has Alzheimer's type dementia. Neuropsychiatric testing was done 9/23/2020 and revealed deficits in attention, learning, memory and language. She had  MMSE 19/30 and CPT 4.5/5.6. Aricept  was started at follow up appt with Dr Mirza 10/7/2020.   She was hospitalized at [facility]  6/25/2021 after a fall resulting in a left intertrochanteric femur  fracture. She underwent IM nailing 6/26/2021 by Dr Mckenzie. Post op course was complicated by encephalopathy and urinary retention. She discharged to [facility] tcu for rehab.   She moved to Memory Care upon her return 8/17/2021.   Other medical issues include HTN, osteoporosis, vitamin D deficiency.    Today's concern is:      Late onset Alzheimer's dementia with behavioral disturbance (H)  Dementia with psychosis (H)  Conjunctivitis of both eyes, unspecified conjunctivitis type  Essential hypertension  Age-related osteoporosis without current pathological fracture  Vitamin D deficiency  She is seen today after nurses report increased irritability, agitation, aggression and refusal of cares. Bathing and changing clothes has always been an issue, but she has become increasingly aggressive with any attempt to assist with hygiene. She has also  "become verbally abusive towards other residents and is creating a disturbance on the unit.   She is in her room today, irritable and not cooperative. Yells \"get out.\"   She has been afebrile with no s/s of acute illness or pain.     Allergies, and PMH/PSH reviewed in EPIC today    REVIEW OF SYSTEMS:  Unobtainable secondary to cognitive impairment.     Objective:   /77   Pulse 69   Temp 97.1  F (36.2  C)   Resp 16   Wt 53.4 kg (117 lb 11.2 oz)   BMI 19.00 kg/m    GENERAL APPEARANCE:  Alert, in no distress, thin. Uncooperative   ENT:  [place], oropharynx clear  EYES: sclera both eyes slightly pink, no drainage  RESP:  no respiratory distress  CV:  no edema   M/S:   gait steady with walker. YANCEY with good strength. No joint inflammation  PSYCH:  oriented to self,  insight and judgement impaired, memory impaired, irritable     Recent labs in EPIC reviewed by me today.     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 Staff unable to provide cares due to aggression.   Discussed at length with her son Perfecto Morton, who reports she has \"always had high anxiety.\" We discussed starting low dose seroquel. Also discussed tapering and discontinuing donepezil due to lack of efficacy in advanced dementia and potential side effects, including anxiety and depression. He wants to discuss with his siblings before making any changes.   Plan: continue donepezil. Memory Care staff assistance with cares, meals, activities and med admin.   Discussed with nurses.   Addendum: son called back 3/18/2022 asking to start low dose seroquel. Care conference scheduled for next week.     (H10.9) Conjunctivitis of both eyes, unspecified conjunctivitis type  Comment: infection did not resolve with a " course of Ocuflox, now appears resolved after tobradex   Plan: monitor for recurrence    (I10) Essential hypertension  Comment: controlled with BPs: 137/77, 135/88  HR: 69-80  Creatinine 0.73, K 4.1 on 2022  Plan: continue quinapril, HCTZ. Follow BMP.     (M81.0) Age-related osteoporosis without current pathological fracture  (E55.9) Vitamin D deficiency  Comment: history of left hip fracture, s/p IM nail 2021. She is up ad finn with walker.   Plan: continue fosamax, calcium and vitamin D-plan to change to daily maintenance dose in the near future        Total time spent with patient visit at the AL facility was 45 mins including patient visit, review of past records and 23 min phone call with her son. Greater than 50% of total time spent in counseling and coordinating care due to progressive dementia with psychosis and aggressive behavior. Counseling son re: progressive nature of dementia, medications and potential side effects, rationale for seroquel, plan of care. Coordinating the following with facility staff: medication orders, behavior management and plan of care.       Electronically signed by: CESAR Best CNP   11/10/1934      Orders 3/18/2022:  1. Seroquel 6.25 mg po bid for dementia with behavior dyscontrol  Script sent to pharmacy      CESAR Best CNP on 3/18/2022 at 1:49 PM          Sincerely,        CESAR Best CNP

## 2022-03-18 NOTE — PROGRESS NOTES
Debi Morton   11/10/1934      Orders 3/18/2022:  1. Seroquel 6.25 mg po bid for dementia with behavior dyscontrol  Script sent to pharmacy      CESAR Best CNP on 3/18/2022 at 1:49 PM

## 2022-03-24 NOTE — LETTER
"    3/24/2022        RE: Debi Morton  [facility]  [address]  [address]  [facility] [id number] [place] [address] GERIATRICS    Chief Complaint   Patient presents with     RECHECK     HPI:  Debi Morton is a 87 year old  (11/10/1934), who is being seen today for an episodic care visit at: THE [facility] ([facility]) [[id number]].   She moved to the AL at this facility from [place] in 9/2020 to be closer to family.  History of cognitive decline and MRI brain 1/27/2020 showed moderate volume loss with progressive atrophy and progressive chronic small vessel disease compared to 2012. She was seen by Dr Mirza at the [facility] 9/11/2020 who felt she likely has Alzheimer's type dementia. Neuropsychiatric testing was done 9/23/2020 and revealed deficits in attention, learning, memory and language. She had  MMSE 19/30 and CPT 4.5/5.6. Aricept  was started at follow up appt with Dr Mirza 10/7/2020.   She was hospitalized at [facility]  6/25/2021 after a fall resulting in a left intertrochanteric femur  fracture. She underwent IM nailing 6/26/2021 by Dr Mckenzie. Post op course was complicated by encephalopathy and urinary retention. She discharged to [facility] tcu for rehab.   She moved to [facility] upon her return 8/17/2021.   Other medical issues include HTN, osteoporosis, vitamin D deficiency.    Today's concern is:      Late onset Alzheimer's dementia with behavioral disturbance (H)  Dementia with psychosis (H)  Conjunctivitis of both eyes, unspecified conjunctivitis type  Essential hypertension  Age-related osteoporosis without current pathological fracture  Vitamin D deficiency  She is seen today for a family care conference due to progressive dementia with worsening agitation and behavior dyscontrol. She is in her room, suspicious and yells \"get out.\"   Met with her son Perfecto Morton, daughter Marisol Morton, the Director of Health " Services and one of the nurses.   Nurses report a very slight improvement after low dose seroquel was started last week, but she continues to be aggressive and refusing cares. She threw her walker at another resident and has become increasingly verbally abusive towards residents and staff.     Allergies, and PMH/PSH reviewed in EPIC today    REVIEW OF SYSTEMS:  Unobtainable secondary to cognitive impairment.     Objective:   /58   Pulse 77   Temp 98  F (36.7  C)   Resp 18   Wt 54.4 kg (120 lb)   BMI 19.37 kg/m    GENERAL APPEARANCE:  Alert, in no distress, thin  ENT:  [place]  EYES:  mild erythema right lower eye lid and sclera, left eye clear  RESP:  no respiratory distress  CV:  no edema  M/S:   gait steady with walker. AYNCEY with good strength  PSYCH:  oriented to self, insight and judgement impaired, memory impaired , agitated    Recent labs in EPIC reviewed by me today.     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 Discussed the following recommendations with her son and daughter: starting citalopram for anxiety and mood, increasing seroquel for agitation and aggression. Both agree with the plan of care. They have a Neurology appointment scheduled for next month.   Plan: increase seroquel to 12.5 mg bid. Start citalopram 5 mg daily for 7 days, then 10 mg daily. Continue donepezil per family request. Memory Care staff assistance with cares, meals,activities, med admin.     (H10.9) Conjunctivitis of both eyes, unspecified conjunctivitis type  Comment: improved after tobradex.   Plan: monitor     (I10) Essential hypertension  Comment: controlled with BPs: 137/77, 135/88  HR: 69-80  Plan: continue quinapril, HCTZ. Avoid hypotension due to advanced age and fall risk    (M81.0) Age-related osteoporosis  without current pathological fracture  (E55.9) Vitamin D deficiency  Comment: history of femur fracture s/p IM nail 6/26/2021. Up ad finn with walker  Plan: continue fosamax, calcium, vitamin D      Total time spent with patient visit at the AL facility was 45 mins including patient visit, review of past records and care conference with family and facility staff. Greater than 50% of total time spent in counseling and coordinating care due to progressive dementia with psychosis and aggressive behavior. Counseling son and daughter re:  nature of dementia, medications and potential side effects, rationale for seroquel and citalopram, plan of care. Coordinating the following with facility staff: medication orders, behavior management and plan of care.         Electronically signed by: CESAR Best CNP             Sincerely,        CESAR Best CNP

## 2022-03-31 NOTE — LETTER
3/31/2022        RE: Debi Morton  [facility]  [address]  [address]  [facility] [id number] [place] [address] GERIATRICS    Chief Complaint   Patient presents with     RECHECK     HPI:  Debi Morton is a 87 year old  (11/10/1934), who is being seen today for an episodic care visit at: THE [facility] ([facility]) [[id number]].   She moved to the AL at this facility from [place] in 9/2020 to be closer to family.  History of cognitive decline and MRI brain 1/27/2020 showed moderate volume loss with progressive atrophy and progressive chronic small vessel disease compared to 2012. She was seen by Dr Mirza at the [facility] 9/11/2020 who felt she likely has Alzheimer's type dementia. Neuropsychiatric testing was done 9/23/2020 and revealed deficits in attention, learning, memory and language. She had  MMSE 19/30 and CPT 4.5/5.6. Aricept  was started at follow up appt with Dr Mirza 10/7/2020.   She was hospitalized at [facility]  6/25/2021 after a fall resulting in a left intertrochanteric femur  fracture. She underwent IM nailing 6/26/2021 by Dr Mckenzie. Post op course was complicated by encephalopathy and urinary retention. She discharged to [facility] tcu for rehab.   She moved to Memory Care upon her return 8/17/2021.   Other medical issues include HTN, osteoporosis, vitamin D deficiency.       Today's concern is:      Late onset Alzheimer's dementia with behavioral disturbance (H)  Dementia with psychosis (H)  Essential hypertension  Conjunctivitis of both eyes, unspecified conjunctivitis type  Age-related osteoporosis without current pathological fracture  Vitamin D deficiency   She is seen today to follow up on behavior dyscontrol with aggressive behavior. Low dose seroquel and citalopram have been started. She is in her room today,angry and not cooperative. Nurses report her mood has been slightly better. Continues to refuse  cares. No recent aggression. Ambulates with a walker. No falls.       Allergies, and PMH/PSH reviewed in EPIC today    REVIEW OF SYSTEMS:  Unobtainable secondary to cognitive impairment.     Objective:   /59   Pulse 70   Temp 98.8  F (37.1  C)   Resp 18   Wt 54.4 kg (120 lb)   BMI 19.37 kg/m    GENERAL APPEARANCE:  Alert, in no distress, thin  ENT:  [place]  EYES: both eyes clear, no drainage   RESP:  no respiratory distress  CV:  no edema  M/S:   gait steady with walker. YANCEY with good strength  PSYCH:  oriented to self, insight and judgement impaired, memory impaired, anxious     Recent labs in [facility] reviewed by me today.     ASSESSMENT / PLAN:  (G30.1,  F02.81) Late onset Alzheimer's dementia with behavioral disturbance (H)  (primary encounter diagnosis)  (F03.91) Dementia with psychosis (H)  Comment: seroquel started 3/18/2022 and dose increased 3/24/2022. Citalopram started 3/24/2022. Slight improvement in mood and agitation, continues to refuse cares.   Plan: continue current dose of seroquel 12.5 mg bid. Increase citalopram to 10 mg daily. Memory Care staff assistance with cares, meals, activities and med admin.   Discussed with son Perfecto Morton who agrees with plan of care.     (I10) Essential hypertension  Comment: recent BPs lower: 101/59, 113/58, 137/77  HR: 66-69  Plan: discontinue HCTZ and KCl. Continue quinapril. BMP next week. Avoid hypotension due to advanced age and fall risk.     (H10.9) Conjunctivitis of both eyes, unspecified conjunctivitis type  Comment: resolved after a course of tobradex. Has had recurrent infections   Plan: assist with hygiene as she will allow. Monitor symptoms     (M81.0) Age-related osteoporosis without current pathological fracture  (E55.9) Vitamin D deficiency  Comment: s/p left femur fracture, s/p IM nail 6/26/2021. Ambulatory with walker   Plan: continue calcium, vitamin D, fosamax         Electronically signed by: CESAR Best CNP              Sincerely,        CESAR Best CNP

## 2022-03-31 NOTE — PROGRESS NOTES
[facility] GERIATRICS    Chief Complaint   Patient presents with     RECHECK     HPI:  Debi Morton is a 87 year old  (11/10/1934), who is being seen today for an episodic care visit at: THE [facility] ([facility]) [[id number]].   She moved to the AL at this facility from [place] in 9/2020 to be closer to family.  History of cognitive decline and MRI brain 1/27/2020 showed moderate volume loss with progressive atrophy and progressive chronic small vessel disease compared to 2012. She was seen by Dr Mirza at the [facility] 9/11/2020 who felt she likely has Alzheimer's type dementia. Neuropsychiatric testing was done 9/23/2020 and revealed deficits in attention, learning, memory and language. She had  MMSE 19/30 and CPT 4.5/5.6. Aricept  was started at follow up appt with Dr Mirza 10/7/2020.   She was hospitalized at [facility]  6/25/2021 after a fall resulting in a left intertrochanteric femur  fracture. She underwent IM nailing 6/26/2021 by Dr Mckenzie. Post op course was complicated by encephalopathy and urinary retention. [facility].   She moved to Memory Care upon her return 8/17/2021.   Other medical issues include HTN, osteoporosis, vitamin D deficiency.       Today's concern is:      Late onset Alzheimer's dementia with behavioral disturbance (H)  Dementia with psychosis (H)  Essential hypertension  Conjunctivitis of both eyes, unspecified conjunctivitis type  Age-related osteoporosis without current pathological fracture  Vitamin D deficiency   She is seen today to follow up on behavior dyscontrol with aggressive behavior. Low dose seroquel and citalopram have been started. She is in her room today,angry and not cooperative. Nurses report her mood has been slightly better. Continues to refuse cares. No recent aggression. Ambulates with a walker. No falls.       Allergies, and PMH/PSH reviewed in EPIC today    REVIEW OF  SYSTEMS:  Unobtainable secondary to cognitive impairment.     Objective:   /59   Pulse 70   Temp 98.8  F (37.1  C)   Resp 18   Wt 54.4 kg (120 lb)   BMI 19.37 kg/m    GENERAL APPEARANCE:  Alert, in no distress, thin  ENT:  [place]  EYES: both eyes clear, no drainage   RESP:  no respiratory distress  CV:  no edema  M/S:   gait steady with walker. YANCEY with good strength  PSYCH:  oriented to self, insight and judgement impaired, memory impaired, anxious     Recent labs in [facility] reviewed by me today.     ASSESSMENT / PLAN:  (G30.1,  F02.81) Late onset Alzheimer's dementia with behavioral disturbance (H)  (primary encounter diagnosis)  (F03.91) Dementia with psychosis (H)  Comment: seroquel started 3/18/2022 and dose increased 3/24/2022. Citalopram started 3/24/2022. Slight improvement in mood and agitation, continues to refuse cares.   Plan: continue current dose of seroquel 12.5 mg bid. Increase citalopram to 10 mg daily. Memory Care staff assistance with cares, meals, activities and med admin.   Discussed with son Perfecto Morton who agrees with plan of care.     (I10) Essential hypertension  Comment: recent BPs lower: 101/59, 113/58, 137/77  HR: 66-69  Plan: discontinue HCTZ and KCl. Continue quinapril. BMP next week. Avoid hypotension due to advanced age and fall risk.     (H10.9) Conjunctivitis of both eyes, unspecified conjunctivitis type  Comment: resolved after a course of tobradex. Has had recurrent infections   Plan: assist with hygiene as she will allow. Monitor symptoms     (M81.0) Age-related osteoporosis without current pathological fracture  (E55.9) Vitamin D deficiency  Comment: s/p left femur fracture, s/p IM nail 6/26/2021. Ambulatory with walker   Plan: continue calcium, vitamin D, fosamax         Electronically signed by: CESAR Best CNP
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
## 2022-05-19 NOTE — PROGRESS NOTES
"[facility] GERIATRICS  Assisted Living Visit   May 19, 2022    Chief Complaint   Patient presents with     RECHECK     Fall     Dementia       HPI:    Debi Morton is a 87 year old  (11/10/1934), who is being seen today for an episodic care visit at the [facility] where she has resided since November of 2020 and in memory care since August 2021. I am meeting her in person for the first time today, but have had many opportunities to talk with her PCP about her plan of care in the months prior. She resides in a secure memory care unit and it's been a process of trying to get the right combination of medications to help with her behaviors, which are rooted in anxiety and manifest as paranoia. Bathing, in particular, has been a challenge as well as verbal and physical aggression towards others. She has followed with Dr. Mirza for a couple of years, most recently last month. She had a hospitalization at the  almost a year ago with a left intertrochanteric femur fracture s/p IM nail (6/26/21).     Her most recent med changes have been the addition of depakote and an increase in quetipine earlier this month. Today, she is seen in her room ambulating with her walker. Nursing reported a fall overnight. Ms. Morton recalls falling, saying she was trying to get to her youngest child who was crying. Initial concern for some left hip pain; however, she reports \"that's done now\" and allowed us to examine her left hip. No bruising or swelling. She is not limping or favoring that leg. She showed us out her window, commenting on the bustle of traffic and also the [place]. Her demeanor today, including allowing an exam, is a marked improvement from baseline per her PCP.     I was able to speak with her family, Marisol and Perfecto, by telephone. Was helpful conversation and I appreciated hearing their historical lens about their mom. Informed them of her fall and that she does not appear injured. Her " room was kept, nothing on the floor, counter top in order along with her photos. Her clothes were clean and she was sharply dressed. She had declined to go for a haircut and I recommended that, as these new medications settle, she stay on the unit and in familiar surroundings. Also discussed that if need to make medication adjustments, would first recommend a 3rd dose of depakote. If she settles in on current meds, would be good to talk at some point about a dose reduction to 20 mg in the citalopram and discontinuing the donepezil.     ALLERGIES: Penicillins    Past Medical, Surgical, Family and Social History reviewed and updated in EPIC.    MEDICATIONS  Current Outpatient Medications   Medication Sig Dispense Refill     acetaminophen (TYLENOL) 325 MG tablet Take 3 tablets (975 mg) by mouth every 8 hours as needed for mild pain 60 tablet 0     alendronate (FOSAMAX) 70 MG tablet TAKE ONE TABLET BY MOUTH EVERY 7 DAYS 4 tablet 97     ASPIRIN LOW DOSE 81 MG chewable tablet TAKE 1 TABLET BY MOUTH EVERY MORNING 36 tablet PRN     atorvastatin (LIPITOR) 10 MG tablet TAKE 1 TABLET BY MOUTH ONCE DAILY 28 tablet PRN     CALCIUM ANTACID 500 MG chewable tablet TAKE 1 TABLET BY MOUTH ONCE DAILY 30 tablet 11     citalopram (CELEXA) 20 MG tablet TAKE ONE AND ONE-HALF TABLETS (30 MG) BY MOUTH ONCE DAILY 135 tablet 11     cyanocobalamin (VITAMIN B-12) 100 MCG tablet TAKE 1 TABLET BY MOUTH ONCE DAILY 31 tablet 11     divalproex sodium delayed-release (DEPAKOTE SPRINKLE) 125 MG DR capsule Take 125 mg by mouth 2 times daily (dx:  Psychosis NOS)       donepezil (ARICEPT) 5 MG tablet TAKE 1 TABLET BY MOUTH ONCE DAILY 90 tablet 3     Multiple Vitamins-Minerals (PRESERVISION AREDS) TABS TAKE 1 TABLET BY MOUTH ONCE DAILY 28 tablet PRN     polyethylene glycol (MIRALAX) 17 GM/Dose powder MIX 17GM OF POWDER IN 8OZ OF WATER UNTIL COMPLETELY DISSOLVED. DRINK SOLUTION BY MOUTH ONCE DAILY 510 g 97     QUEtiapine (SEROQUEL) 50 MG tablet Take 50mg  "TID(9a, 4p, 8p) and 50mg TID PRN.       vitamin D2 (ERGOCALCIFEROL) 37910 units (1250 mcg) capsule TAKE ONE CAPSULE BY MOUTH ONCE A WEEK 4 capsule PRN     Medications reviewed:  Medications reconciled to facility chart and changes were made to reflect current medications as identified as above med list. Below are the changes that were made:   Medications stopped since last EPIC medication reconciliation:   There are no discontinued medications.  Medications started since last [facility] medication reconciliation:  No orders of the defined types were placed in this encounter.    REVIEW OF SYSTEMS:  Unable to be obtained due to cognitive impairment.     PHYSICAL EXAM:  BP (!) 176/82   Pulse 62   Temp 97.1  F (36.2  C)   Resp 18   Ht 1.676 m (5' 6\")   Wt 59.4 kg (131 lb)   SpO2 97%   BMI 21.14 kg/m    Gen: walking about her room with a walker, alert, cooperative and in no acute distress  Card: RRR, S1, S2, no murmurs  Resp: lungs clear to auscultation bilaterally, no crackles or wheezes and moving good air  Ext: no LE edema; no tenderness to palpation over lateral or posterior left hip   Neuro: CX II-XII grossly in tact; ROM in all four extremities grossly in tact; no limping or favoring of her left leg noted with ambulation   Psych: memory, judgement and insight impaired  Skin: no ecchymoses or swelling noted over posterior or lateral left hip    LABS & IMAGING  Recent Labs and Imaging:  Reviewed as per Epic    ASSESSMENT/PLAN:    Alzheimer's Dementia With Behavioral Disturbance  Psychosis  Recent med changes (addition of depakote and increase in quetipine) appear to be beneficial.   -- would not recommend we make any changes today.  -- citalopram 30 mg daily, depakote 125 mg BID, donepezil 5 mg daily and quetiapine 50 mg TID as well as TID PRN  -- ongoing supportive cares    Fall  Had a fall overnight. No apparent injury. Ambulating about her apartment just fine.   -- mitigate risk for falls as best able with " reminders to use her walker, etc    HTN  SBPs had been in 100s-110s and HCTZ and quinipril have appropriately been stepwise discontinued. Her BP was elevated post fall to 176 systolic, but trend has been more 130s without any ant-HTN medications. Give age, risk for falls, SBPs in 130s-140s would be ideal.   -- follow BPs and add back medication if needed    Slow Transit Constipation  -- Miralax 17g daily  -- adjust bowel regimen as needed    Electronically signed by  Kary Romero MD

## 2022-05-26 PROBLEM — S12.601A CLOSED NONDISPLACED FRACTURE OF SEVENTH CERVICAL VERTEBRA, UNSPECIFIED FRACTURE MORPHOLOGY, INITIAL ENCOUNTER (H): Status: ACTIVE | Noted: 2022-01-01

## 2022-05-26 PROBLEM — U07.1 INFECTION DUE TO 2019 NOVEL CORONAVIRUS: Status: ACTIVE | Noted: 2022-01-01

## 2022-05-26 PROBLEM — S09.90XA CLOSED HEAD INJURY, INITIAL ENCOUNTER: Status: ACTIVE | Noted: 2022-01-01

## 2022-05-26 NOTE — PROGRESS NOTES
[facility] GERIATRICS    Chief Complaint   Patient presents with     RECHECK     HPI:  Deib Morton is a 87 year old  (11/10/1934), who is being seen today for an episodic care visit at: THE [facility] ([facility]) [[id number]].   She moved to the AL at this facility from [place] in 9/2020 to be closer to family.  History of cognitive decline and MRI brain 1/27/2020 showed moderate volume loss with progressive atrophy and progressive chronic small vessel disease compared to 2012. She was seen by Dr Mirza at the [facility] 9/11/2020 who felt she likely has Alzheimer's type dementia. Neuropsychiatric testing was done 9/23/2020 and revealed deficits in attention, learning, memory and language. She had  MMSE 19/30 and CPT 4.5/5.6. Aricept  was started at follow up appt with Dr Mirza 10/7/2020.   She was hospitalized at [facility]  6/25/2021 after a fall resulting in a left intertrochanteric femur  fracture. She underwent IM nailing 6/26/2021 by Dr Mckenzie. Post op course was complicated by encephalopathy and urinary retention. She discharged to tcu and moved to Memory Care upon her return 8/17/2021.   Other medical issues include HTN, osteoporosis, vitamin D deficiency.    Today's concern is:     Infection due to 2019 novel coronavirus  Fall, initial encounter  Late onset Alzheimer's dementia with behavioral disturbance (H)  Dementia with psychosis (H)  Essential hypertension  She is seen today to follow up after testing positive for COVID-19 on routine screening 5/21/2022. Staff has noted mild nasal congestion and cold symptoms. No fever, no hypoxia. Appetite has been down and she's fatigued. She had an unwitnessed fall in her apartment early this morning and reported hitting her head on the closet door. She was noted to have a very small bump with intact skin and no other injury.   She is unable to provide history. Resting in bed, which is unusual for her. Pleasant  and cooperative. Reports she fell this morning, but unable to provide details. Denies headache or pain. Ambulates with a walker and independent with transfers. Aggressive behavior and compliance with bathing has improved with current dose of seroquel and the addition of depakote.     Allergies, and PMH/PSH reviewed in EPIC today    REVIEW OF SYSTEMS:  Unobtainable secondary to cognitive impairment.     Objective:   /73   Pulse 78   Temp 98.7  F (37.1  C)   Resp 16   Wt 59.4 kg (131 lb)   BMI 21.14 kg/m    GENERAL APPEARANCE:  Alert, in no distress, appears fatigued  ENT:  [place], oropharynx clear  EYES:  EOM normal, conjunctiva and lids normal  NECK:  no adenopathy, no thyromegaly  RESP:  lungs clear to auscultation , no respiratory distress  CV:  regular rate and rhythm, no murmur, no edema  ABDOMEN:  soft, non-tender, no distension, no masses  M/S:   in bed. YANCEY with good strength. No joint inflammation  SKIN:  very small bump right posterior scalp, skin intact. No bruising, open areas or rashes  PSYCH:  oriented to self, insight and judgement impaired, memory impaired , affect and mood normal    Recent labs in [facility] reviewed by me today.     ASSESSMENT / PLAN:  (U07.1) Infection due to 2019 novel coronavirus  (primary encounter diagnosis)  Comment: tested positive 5/21/2022, symptoms mild and appear to be resolving.   Paxlovid was discussed with family and deferred due to mild symptoms and potential med interactions. Would have needed to hold seroquel and potential risk of this outweighs benefit of Paxlovid.   Plan: closely monitor symptoms, VS, O2 sats. Staff assistance with cares and mobility.   Discussed with her daughter Marisol Morton, who agrees with plan of care.   Consulted with Dr Kary Romero, who has also talked with daughter re: Paxlovid.     (W19.XXXA) Fall, initial encounter  Comment: history of frequent falls, fatigue due to COVID-19 infection may have contributed to the fall this morning. She  has a tiny bump on her right scalp, no other signs of injury. Neuro status is at baseline.   Plan: closely monitor neuros. Up with walker and assistance. Refer to home care for PHYSICAL THERAPY/OT.     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 Staff recently able to give her a shower and provide more assistance with cares.   Citalopram was increased to 30 mg daily and donepezil 5 mg daily continued per Neurology.   Plan: continue seroquel 50 mg tid plus 50 mg  tid prn. Continue depakote 125 mg bid, citalopram, donepezil. Memory Care staff assistance with cares, meals, activities and med admin.     (I10) Essential hypertension  Comment: HCTZ and quinapril have been tapered and discontinued. BPs remain within range: 138/73, 145/68  HR: 78-90   Plan: monitor BS. Avoid hypotension due to advanced age and fall risk.         Electronically signed by: CESAR Best CNP        Rhombic Flap Text: The defect edges were debeveled with a #15 scalpel blade.  Given the location of the defect and the proximity to free margins a rhombic flap was deemed most appropriate.  Using a sterile surgical marker, an appropriate rhombic flap was drawn incorporating the defect.    The area thus outlined was incised deep to adipose tissue with a #15 scalpel blade.  The skin margins were undermined to an appropriate distance in all directions utilizing iris scissors.

## 2022-05-26 NOTE — LETTER
5/26/2022        RE: Debi Morton  [facility]  [address]  [address]  [facility] [id number] [place] [address] GERIATRICS    Chief Complaint   Patient presents with     RECHECK     HPI:  Debi Morton is a 87 year old  (11/10/1934), who is being seen today for an episodic care visit at: THE [facility] [[id number]].   She moved to the AL at this facility from [place] in 9/2020 to be closer to family.  History of cognitive decline and MRI brain 1/27/2020 showed moderate volume loss with progressive atrophy and progressive chronic small vessel disease compared to 2012. She was seen by Dr Mirza at the [facility] 9/11/2020 who felt she likely has Alzheimer's type dementia. Neuropsychiatric testing was done 9/23/2020 and revealed deficits in attention, learning, memory and language. She had  MMSE 19/30 and CPT 4.5/5.6. Aricept  was started at follow up appt with Dr Mirza 10/7/2020.   She was hospitalized at [facility]  6/25/2021 after a fall resulting in a left intertrochanteric femur  fracture. She underwent IM nailing 6/26/2021 by Dr Mckenzie. Post op course was complicated by encephalopathy and urinary retention. She discharged to tcu and moved to Memory Care upon her return 8/17/2021.   Other medical issues include HTN, osteoporosis, vitamin D deficiency.    Today's concern is:     Infection due to 2019 novel coronavirus  Fall, initial encounter  Late onset Alzheimer's dementia with behavioral disturbance (H)  Dementia with psychosis (H)  Essential hypertension  She is seen today to follow up after testing positive for COVID-19 on routine screening 5/21/2022. Staff has noted mild nasal congestion and cold symptoms. No fever, no hypoxia. Appetite has been down and she's fatigued. She had an unwitnessed fall in her apartment early this morning and reported hitting her head on the closet door. She was noted to have a very small  bump with intact skin and no other injury.   She is unable to provide history. Resting in bed, which is unusual for her. Pleasant and cooperative. Reports she fell this morning, but unable to provide details. Denies headache or pain. Ambulates with a walker and independent with transfers. Aggressive behavior and compliance with bathing has improved with current dose of seroquel and the addition of depakote.     Allergies, and PMH/PSH reviewed in EPIC today    REVIEW OF SYSTEMS:  Unobtainable secondary to cognitive impairment.     Objective:   /73   Pulse 78   Temp 98.7  F (37.1  C)   Resp 16   Wt 59.4 kg (131 lb)   BMI 21.14 kg/m    GENERAL APPEARANCE:  Alert, in no distress, appears fatigued  ENT:  [place], oropharynx clear  EYES:  EOM normal, conjunctiva and lids normal  NECK:  no adenopathy, no thyromegaly  RESP:  lungs clear to auscultation , no respiratory distress  CV:  regular rate and rhythm, no murmur, no edema  ABDOMEN:  soft, non-tender, no distension, no masses  M/S:   in bed. YANCEY with good strength. No joint inflammation  SKIN:  very small bump right posterior scalp, skin intact. No bruising, open areas or rashes  PSYCH:  oriented to self, insight and judgement impaired, memory impaired , affect and mood normal    Recent labs in [facility] reviewed by me today.     ASSESSMENT / PLAN:  (U07.1) Infection due to 2019 novel coronavirus  (primary encounter diagnosis)  Comment: tested positive 5/21/2022, symptoms mild and appear to be resolving.   Paxlovid was discussed with family and deferred due to mild symptoms and potential med interactions. Would have needed to hold seroquel and potential risk of this outweighs benefit of Paxlovid.   Plan: closely monitor symptoms, VS, O2 sats. Staff assistance with cares and mobility.   Discussed with her daughter Marisol Morton, who agrees with plan of care.   Consulted with Dr Kary Romero, who has also talked with daughter re: Paxlovid.     (W19.XXXA) Fall, initial  encounter  Comment: history of frequent falls, fatigue due to COVID-19 infection may have contributed to the fall this morning. She has a tiny bump on her right scalp, no other signs of injury. Neuro status is at baseline.   Plan: closely monitor neuros. Up with walker and assistance. Refer to home care for PHYSICAL THERAPY/OT.     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 Staff recently able to give her a shower and provide more assistance with cares.   Citalopram was increased to 30 mg daily and donepezil 5 mg daily continued per Neurology.   Plan: continue seroquel 50 mg tid plus 50 mg  tid prn. Continue depakote 125 mg bid, citalopram, donepezil. Memory Care staff assistance with cares, meals, activities and med admin.     (I10) Essential hypertension  Comment: HCTZ and quinapril have been tapered and discontinued. BPs remain within range: 138/73, 145/68  HR: 78-90   Plan: monitor BS. Avoid hypotension due to advanced age and fall risk.         Electronically signed by: CESAR Best CNP             Sincerely,        CESAR Best CNP
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
## 2022-05-27 NOTE — PROGRESS NOTES
[facility]   Tertiary Survey Progress Note     Date of Service: 05/27/2022    Trauma Mechanism: GLF  Date of Injury: 5/26/22  Known Injuries:  1. Acute C7 osteophyte fracture    Other diagnosis:  1. COVID+, asymptomatic      Assessment & Plan   Neuro/Pain/Psych:  # Alzheimer's disease  # Dementia  - Head CT: No acute intracranial pathology.   - Continue PTA: citalopram, Seroquel 50 TID,   - Holding prn Seroquel at this time   - Maintain circadian rhythm. Lights on during the day. Off at night, minimize cares at night. OOB during the day.    # Acute on chronic pain   Prn: Tylenol,     Pulmonary:  - Supplemental oxygen to keep saturation above 92 %.   - Incentive spirometer while awake     Cardiovascular:    # Hyperlipidemia   # HTN  - Monitor hemodynamic status.   - PRN: hydralazine, labetalol   - continue PTA: atorvastatin,     GI/Nutrition:    # GERD  - Regular diet     Renal/ Fluids/Electrolytes:  # Hypokalemia   - KCl 20mEq given   - Creatinine: 0.68  - electrolyte replacement protocol in place.     Endocrine:  - No management indication.     Infectious disease:   # COVID 19 +  # Leukocytosis, stress vs infectious   - WBC: 18.2  - CRP added to labs  - Pt is afebrile, normal HR,   - UA ordered   - No indications for antibiotics.      Hematology:    # Thrombocytosis, acute on chronic elevation     - Hgb 11.3. Monitor and trend.   - Threshold for transfusion if hgb <7.0 or signs/symptoms of hypoperfusion.     - Platelet Count: 1,072, 7/6/21:720, monitor and trend   - continue PTA ASA  - Started Lovenox 30bid, for DVT prophylaxis     Musculoskeletal:  # Multiple Falls   # Osteoporosis   # Weakness and deconditioning of chronic illness   # Acute C7 osteophyte fracture  - Neurosurgery following: No neurosurgical intervention indicated at this time, Cervical flexion-extension XR completed, No cervical collar necessary currently given likelihood of increasing fall risk for  patient  - Changed activity orders and discontinued C-spine precautions.   - Physical and occupational therapy consults.    Skin:   - dilgent cares to prevent skin breakdown and wound formation.      Lines/ tubes/ drains:  - PIV     General Cares:    PPI/H2 blocker:  NA   DVT prophylaxis: Lovenox    Bowel Regimen/Date of last stool: in place   Pulmonary toilet: IS    Code status:  DNR/DNI     Discharge goals:     Adequate pain management: yes    VSS x24 hours: yes    Hemoglobin stable x 48 hours: NA    Ambulating safely and/or therapy evals complete: in process    Drains/lines removed or plan in place to manage: yes    Teaching done:     Other: Under COVID isolation  Expected D/C date: ~1 day until medically ready, dependent on COVID isolation and Memory Care facility policy.     Shital Garrido PA-C  To contact the trauma service use job code pager [phone],   Numeric texts or alpha text through [facility]      Interval History   Review of Systems   Skin: positive for scaling, bruising, lumps or bumps  Eyes: negative  Ears/Nose/Throat: negative  Respiratory: No shortness of breath, dyspnea on exertion, cough, or hemoptysis  Cardiovascular: negative  Gastrointestinal: negative  Genitourinary: negative  Musculoskeletal: positive for fracture  Neurologic: positive for memory problems   Psychiatric: positive for depression and agitation  Hematologic/Lymphatic/Immunologic: negative  Endocrine: negative     Physical Exam   Shannon Coma Scale - Total 14/15  Eye Response (E): 4   4= spontaneous, 3= to verbal/voice, 2= to pain, 1= No response   Verbal Response (V): 4   5= Orientated, converses, 4= Confused, converses, 3= Inappropriate words, 2= Incomprehensible sounds, 1=No response   Motor Response (M): 6   6= Obeys commands, 5= Localizes to pain, 4= Withdrawal to pain, 3=Fexion to pain, 2= Extension to pain, 1= No response     Frailty Questionnaire: To be done for all patients age 60+  F (Fatigue): Is the patient easily  fatigued? YES = 1  R (Resistance): Is the patient unable to walk one flight of stairs? YES = 1  A (Ambulation): Is the patient unable to walk one block? YES = 1  I  (Illness): Does the patient have more than five illnesses? NO = 0  L (Loss of weight): Has the patient lost more than 5% of weight in the past 6 months. NO = 0  Lost five pounds or more in the last 3 months without trying? AND/OR Unintended weight loss?  Does the patient have difficulty performing housework such as washing windows or scrubbing floors? AND Activity in a typical 24-hour day- No moderate or vigorous activity    Score: 3    Score:3-5: PLAN: Palliative Care Consult, PT/OT Consult, consider PM&R, Delirium Prevention Strategies                           Physical Exam  Constitutional: Awake, alert, cooperative, no apparent distress.  Eyes: Lids and lashes normal,EOMI, sclera clear, conjunctiva normal.  HENT: Normocephalic, atraumatic  Respiratory: No increased work of breathing, good air exchange, clear to auscultation bilaterally, no crackles or wheezing.  Cardiovascular:  regular rate and rhythm, normal S1 and S2,   GI: Abdomen soft, non-distended, non-tender, no guarding  Genitourinary:  Not assessed   Skin: Warm 7 dry.  Musculoskeletal: There is no redness, warmth, or swelling of the joints.  Pedal pulse palpated.  Neurologic: Awake, alert. Strength and sensory is intact. No new focal deficits.  Neuropsychiatric: Calm, alert, affect appropriate to situation,     Temp: 98.6  F (37  C) Temp src: Oral BP: (!) 173/76 Pulse: 85   Resp: 18 SpO2: 94 % O2 Device: None (Room air)    Vitals:    05/26/22 2027   Weight: 59.4 kg (131 lb)     Vital Signs with Ranges  Temp:  [98.6  F (37  C)-98.7  F (37.1  C)] 98.6  F (37  C)  Pulse:  [78-96] 85  Resp:  [16-24] 18  BP: (138-173)/(73-86) 173/76  SpO2:  [90 %-97 %] 94 %  No intake/output data recorded.

## 2022-05-27 NOTE — PROGRESS NOTES
Patient has baseline confusion and repeatedly attempts to take off her aspen collar. 1:1 sitter placed.

## 2022-05-27 NOTE — PROGRESS NOTES
Brief Neurosurgery Progress Note    ASSESSMENT:  87 year old female with Alzheimer's dementia and osteoporosis presenting after ground-level fall with finding of C7 anterior superior osteophyte fracture.     RECOMMENDATIONS:  - No neurosurgical intervention indicated at this time   - No cervical collar necessary currently given likelihood of increasing fall risk for patient  - Avoid sedating medications that would alter a neurological examination    Follow up with Neurosurgery clinic in 2 weeks with repeat flexion/extension XR (ordered and arranged)    Neurosurgery will sign off.     Taina Brown MD  PGY-1

## 2022-05-27 NOTE — ED PROVIDER NOTES
ED Provider Note  [facility]      History     Chief Complaint   Patient presents with     Fall     HPI  Debi Morton is a 87 year old female with a past medical history significant for Alzheimer's disease, dementia, GERD, hyperlipidemia, hypertension, and osteoporosis who presents to the ED via EMS for evaluation of a fall.  Patient is COVID-positive as of 5/21/2022.  Patient has baseline dementia and is unable to provide history.  Report is that patient has had 2 falls over the past couple days, most recently today where she fell backwards and hit her head.  Patient is not anticoagulated.  Patient complains of pain in the back of her head and has no other complaints at this time.    Past Medical History  Past Medical History:   Diagnosis Date     Age-related osteoporosis without current pathological fracture      Dementia (H)      Gastroesophageal reflux disease      Hypercalcemia      Hyperlipidemia      Hypertension      Late onset Alzheimer's disease without behavioral disturbance (H)      Lipidemia      Osteoporosis      Tremor      Venous insufficiency      Vitamin D deficiency      Elvin-Parkinson-White (WPW) pattern      Past Surgical History:   Procedure Laterality Date     HYSTERECTOMY       OPEN REDUCTION INTERNAL FIXATION RODDING INTRAMEDULLARY FEMUR Left 6/26/2021    Procedure: LEFT OPEN REDUCTION INTERNAL FIXATION, FRACTURE, FEMUR, USING INTRAMEDULLARY MICHELINE;  Surgeon: Elizabeth Mckenzie MD;  Location: [facility] OR     acetaminophen (TYLENOL) 325 MG tablet  alendronate (FOSAMAX) 70 MG tablet  ASPIRIN LOW DOSE 81 MG chewable tablet  atorvastatin (LIPITOR) 10 MG tablet  CALCIUM ANTACID 500 MG chewable tablet  citalopram (CELEXA) 20 MG tablet  cyanocobalamin (VITAMIN B-12) 100 MCG tablet  divalproex sodium delayed-release (DEPAKOTE SPRINKLE) 125 MG DR capsule  donepezil (ARICEPT) 5 MG tablet  Multiple Vitamins-Minerals (PRESERVISION AREDS) TABS  polyethylene glycol (MIRALAX) 17  GM/Dose powder  QUEtiapine (SEROQUEL) 50 MG tablet  vitamin D2 (ERGOCALCIFEROL) 86589 units (1250 mcg) capsule      Allergies   Allergen Reactions     Penicillins Anaphylaxis     Penicillins Unknown     Family History  No family history on file.  Social History   Social History     Tobacco Use     Smoking status: Former Smoker     Smokeless tobacco: Never Used   Substance Use Topics     Alcohol use: Not Currently     Drug use: Never      Past medical history, past surgical history, medications, allergies, family history, and social history were reviewed with the patient. No additional pertinent items.       Review of Systems  A complete review of systems was attempted but limited due to Dementia.    Physical Exam   BP: (!) 149/82  Pulse: 85  Resp: 16  Weight: 59.4 kg (131 lb)  SpO2: 95 %  Physical Exam  Vitals and nursing note reviewed.   Constitutional:       General: She is not in acute distress.     Appearance: She is well-developed. She is not ill-appearing, toxic-appearing or diaphoretic.      Comments: Patient is awake and alert, she is confused and not oriented to place or time, this seems to be baseline.  She is protecting her airway without difficulty.   HENT:      Head: Normocephalic. Contusion present.        Mouth/Throat:      Lips: Pink.      Mouth: Mucous membranes are moist.      Pharynx: Oropharynx is clear. No oropharyngeal exudate.   Eyes:      General: Lids are normal. No scleral icterus.     Extraocular Movements: Extraocular movements intact.      Right eye: No nystagmus.      Left eye: No nystagmus.      Conjunctiva/sclera: Conjunctivae normal.      Pupils: Pupils are equal, round, and reactive to light.   Neck:      Thyroid: No thyromegaly.      Vascular: No JVD.      Trachea: No tracheal deviation.   Cardiovascular:      Rate and Rhythm: Normal rate and regular rhythm.      Pulses: Normal pulses.      Heart sounds: Normal heart sounds. No murmur heard.    No friction rub. No gallop.    Pulmonary:      Effort: Pulmonary effort is normal. No respiratory distress.      Breath sounds: Normal breath sounds.   Chest:      Chest wall: No tenderness.   Abdominal:      General: Bowel sounds are normal. There is no distension.      Palpations: Abdomen is soft. There is no mass.      Tenderness: There is no abdominal tenderness. There is no guarding or rebound.   Musculoskeletal:         General: No tenderness. Normal range of motion.      Cervical back: Normal range of motion and neck supple. No erythema, rigidity or tenderness.      Thoracic back: No tenderness.      Lumbar back: No tenderness.      Right lower leg: No edema.      Left lower leg: No edema.   Lymphadenopathy:      Cervical: No cervical adenopathy.   Skin:     General: Skin is warm and dry.      Capillary Refill: Capillary refill takes less than 2 seconds.      Coloration: Skin is not pale.      Findings: No erythema or rash.   Neurological:      Mental Status: She is alert. Mental status is at baseline. She is disoriented and confused.      GCS: GCS eye subscore is 4. GCS verbal subscore is 4. GCS motor subscore is 6.      Cranial Nerves: No cranial nerve deficit, dysarthria or facial asymmetry.      Sensory: No sensory deficit.      Motor: Motor function is intact.         ED Course      Procedures                     Results for orders placed or performed during the hospital encounter of 05/26/22   CT Head w/o Contrast     Status: None    Narrative    CT HEAD W/O CONTRAST 5/26/2022 9:32 PM    History: Trauma     Comparison: None    Technique: Using multidetector thin collimation helical acquisition  technique, axial, coronal and sagittal CT images from the skull base  to the vertex were obtained without intravenous contrast.   (topogram) image(s) also obtained and reviewed.    Findings: There is no intracranial hemorrhage, mass effect, or midline  shift. Chronic parenchymal atrophy, greatest in the temporal lobes.  Patchy  periventricular hypoattenuation, likely sequela of chronic  small vessel ischemic disease. Gray/white matter differentiation in  both cerebral hemispheres is preserved. Ventricles are proportionate  to the cerebral sulci. The basal cisterns are clear.    Right posterior scalp hematoma. The bony calvaria and the bones of the  skull base are normal. Mucosal thickening and layering fluid in the  maxillary sinuses, ethmoid air cells, and to a lesser extent the  frontal sinuses. Mastoid air cells are clear.      Impression    Impression:  No acute intracranial pathology.     I have personally reviewed the examination and initial interpretation  and I agree with the findings.    RYNE PETIT MD         SYSTEM ID:  [id number]   CT Cervical Spine w/o Contrast     Status: None    Narrative    CT CERVICAL SPINE W/O CONTRAST 5/26/2022 9:31 PM    Provided History: Trauma    Comparison: None available    Technique: Using multidetector thin collimation helical acquisition  technique, axial, coronal and sagittal CT images through the cervical  spine were obtained without intravenous contrast.     Findings:  Acute nondisplaced fracture of the C7 anterior superior osteophyte.  Trace adjacent edema. No additional fracture identified.    Straightening of the normal cervical lordosis. Multilevel joint space  narrowing greatest at C5-6. Grade 1 retrolisthesis of C5 on C6.    The findings on a level by level basis are as follows:    C2-3:  No spinal canal or neural foraminal stenosis.    C3-4:  Right greater than left uncovertebral spurring and facet  arthropathy resulting in mild right foraminal stenosis. No left  foraminal or canal stenosis.    C4-5:  Disc osteophyte complex. Bilateral uncovertebral spurring and  right greater than left facet arthropathy resulting in mild right  foraminal stenosis. No left foraminal or canal stenosis.    C5-6:  Disc osteophyte complex. Bilateral uncovertebral spurring.  Moderate bilateral neural  foraminal stenosis. Mild canal stenosis.    C6-7:  Disc osteophyte complex anteriorly with nondisplaced fracture  of the anterior osteophyte. Bilateral uncovertebral spurring and facet  arthropathy resulting in mild bilateral neural foraminal stenosis. No  significant canal stenosis.    C7-T1:  No spinal canal or neural foraminal stenosis.     No abnormality of the paraspinous soft tissues.      Impression    Impression:   1. Acute nondisplaced fracture of an osteophyte arising from the  anterior superior C7 vertebral body.  2. Multilevel degenerative changes, most significantly resulting in  moderate bilateral neural foraminal stenosis and mild spinal canal  stenosis at C5-6.    [Result: C7 osteophyte fracture]    Finding was identified on 5/26/2022 9:50 PM.     Dr. Butcher was contacted by Dr. Ruiz at 5/26/2022 9:58 PM and  verbalized understanding of the finding.     I have personally reviewed the examination and initial interpretation  and I agree with the findings.    RYNE PETIT MD         SYSTEM ID:  [id number]   [place] Draw     Status: None    Narrative    The following orders were created for panel order [place] Draw.  Procedure                               Abnormality         Status                     ---------                               -----------         ------                     Extra Blue Top Tube[[id number]]                              Final result               Extra Red Top Tube[[id number]]                               Final result               Extra Green Top (Lithium...[[id number]]                      Final result               Extra Purple Top Tube[[id number]]                            Final result                 Please view results for these tests on the individual orders.   Extra Blue Top Tube     Status: None   Result Value Ref Range    Hold Specimen JIC    Extra Red Top Tube     Status: None   Result Value Ref Range    Hold Specimen JIC    Extra Green Top (Lithium Heparin) Tube      Status: None   Result Value Ref Range    Hold Specimen JIC    Extra Purple Top Tube     Status: None   Result Value Ref Range    Hold Specimen Done    Comprehensive metabolic panel     Status: Abnormal   Result Value Ref Range    Sodium 136 133 - 144 mmol/L    Potassium 3.0 (L) 3.4 - 5.3 mmol/L    Chloride 101 94 - 109 mmol/L    Carbon Dioxide (CO2) 28 20 - 32 mmol/L    Anion Gap 7 3 - 14 mmol/L    Urea Nitrogen 29 7 - 30 mg/dL    Creatinine 0.68 0.52 - 1.04 mg/dL    Calcium 8.7 8.5 - 10.1 mg/dL    Glucose 107 (H) 70 - 99 mg/dL    Alkaline Phosphatase 85 40 - 150 U/L    AST 28 0 - 45 U/L    ALT 24 0 - 50 U/L    Protein Total 6.1 (L) 6.8 - 8.8 g/dL    Albumin 2.6 (L) 3.4 - 5.0 g/dL    Bilirubin Total 0.6 0.2 - 1.3 mg/dL    GFR Estimate 84 >60 mL/min/1.73m2   CBC with platelets and differential     Status: Abnormal   Result Value Ref Range    WBC Count 18.2 (H) 4.0 - 11.0 10e3/uL    RBC Count 3.75 (L) 3.80 - 5.20 10e6/uL    Hemoglobin 11.3 (L) 11.7 - 15.7 g/dL    Hematocrit 34.5 (L) 35.0 - 47.0 %    MCV 92 78 - 100 fL    MCH 30.1 26.5 - 33.0 pg    MCHC 32.8 31.5 - 36.5 g/dL    RDW 14.4 10.0 - 15.0 %    Platelet Count 1,072 (HH) 150 - 450 10e3/uL    % Neutrophils 83 %    % Lymphocytes 3 %    % Monocytes 10 %    % Eosinophils 1 %    % Basophils 1 %    % Immature Granulocytes 2 %    NRBCs per 100 WBC 0 <1 /100    Absolute Neutrophils 15.4 (H) 1.6 - 8.3 10e3/uL    Absolute Lymphocytes 0.5 (L) 0.8 - 5.3 10e3/uL    Absolute Monocytes 1.9 (H) 0.0 - 1.3 10e3/uL    Absolute Eosinophils 0.1 0.0 - 0.7 10e3/uL    Absolute Basophils 0.1 0.0 - 0.2 10e3/uL    Absolute Immature Granulocytes 0.3 <=0.4 10e3/uL    Absolute NRBCs 0.0 10e3/uL   RBC and Platelet Morphology     Status: Abnormal   Result Value Ref Range    Platelet Assessment  Automated Count Confirmed. Platelet morphology is normal.     Automated Count Confirmed. Platelet morphology is normal.    [place] Cells Marked (A) None Seen    RBC Morphology Confirmed RBC Indices     CBC with platelets differential     Status: Abnormal    Narrative    The following orders were created for panel order CBC with platelets differential.  Procedure                               Abnormality         Status                     ---------                               -----------         ------                     CBC with platelets and d...[[id number]]  Abnormal            Final result               RBC and Platelet Morphology[[id number]]  Abnormal            Final result                 Please view results for these tests on the individual orders.     Medications - No data to display     Assessments & Plan (with Medical Decision Making)     This patient presents to the emergency department after falling at her nursing home.  She lives in a memory care unit and has tested positive for COVID.  This may have been somewhat contributory in her recent falls.  She did suffer a closed head injury from her most recent fall and CT scan of her head as well as cervical spine was obtained.  Cervical spine CT demonstrated a fracture of the anterior superior osteophyte of C7 with some surrounding edema.  She seems neurologically intact.  She was placed in an [place] collar.  Trauma surgery was notified and will admit the patient for further treatment and evaluation.  I also notified neurosurgery who will evaluate the patient in consult.    I have reviewed the nursing notes. I have reviewed the findings, diagnosis, plan and need for follow up with the patient.    New Prescriptions    No medications on file       Final diagnoses:   Closed nondisplaced fracture of seventh cervical vertebra, unspecified fracture morphology, initial encounter (H)   Closed head injury, initial encounter   Infection due to 2019 novel coronavirus       --  Chico Butcher  [facility] EMERGENCY DEPARTMENT  5/26/2022     Chico Butcher MD  06/11/22 [id number]

## 2022-05-27 NOTE — UTILIZATION REVIEW
"Admission Status; Secondary Review Determination     Under the authority of the Utilization Management Committee, the utilization review process indicated a secondary review on the above patient. The review outcome is based on review of the medical records, discussions with staff, and applying clinical experience noted on the date of the review.     (x) Observation Status Appropriate - This patient does not meet hospital inpatient criteria and is placed in observation status. If this patient's primary payer is Medicare and was admitted as an inpatient, Condition Code 44 should be used and patient status changed to \"observation\".     RATIONALE FOR DETERMINATION:  87 year old female on daily ASA 81 and alendronate with history of age-related osteoporosis and Alzheimer's dementia presenting after ground-level fall at her [facility] care facility with  finding of anterior superior C7 osteophyte fracture on CT.  Of note, she is also COVID-positive as of 5/21/22.    Vital signs notable for moderate hypertension without fever or hypoxia.    Labs notable for thrombocytosis (1000) and leukocytosis (18).  Potassium is 3.      C-spine CT showed an acute nondisplaced fracture of an osteophyte arising from the   anterior superior C7 vertebral body.  Head CT negative    The patient has been evaluated by neurosurgery.  No surgery is anticipated.  No cervical collar is necessary.     No medication was required for symptom management overnight.      PT/OT has been ordered.      The severity of illness, intensity of service provided, expected LOS and risk for adverse outcome make the care appropriate for further observation; however, doesn't meet criteria for hospital inpatient admission. I have attempted to contact a provider caring for this patient but have been unsuccessful so far.     This document was produced using voice recognition software.   The information on this document is developed by the utilization review team in order " for the business office to ensure compliance. This only denotes the appropriateness of proper admission status and does not reflect the quality of care rendered.   The definitions of Inpatient Status and Observation Status used in making the determination above are those provided in the CMS Coverage Manual, Chapter 1 and Chapter 6, section 70.4.     Sincerely,     Yo Santamaria MD  Utilization Review   Physician Advisor   [facility]

## 2022-05-27 NOTE — CONSULTS
[facility]       NEUROSURGERY CONSULTATION NOTE    This consultation was requested by Dr. Butcher from the Emergency Medicine service.    Reason for Consultation  C7 anterior superior osteophyte fracture    HPI:  Debi Morton is a 87 year old female on daily ASA 81 and alendronate with history of age-related osteoporosis and Alzheimer's dementia presenting after ground-level fall at her memory care facility with  finding of anterior superior C7 osteophyte fracture on CT.  Of note, she is also COVID-positive as of 5/21/22.    On interview, Ms. Morton is unable to provide history due to her baseline dementia.  Her only complaint is her Aspen cervical collar, which bothers her greatly.  She begs for the collar to be removed.  She denies any neck pain.    In conversation, Ms Morton talks about her dead mother as if she were still alive; she states that her mother is 70 years old (patient is 87 years old).  She perseverates on her mother and her cervical collar.    The patient's past medical/surgical history is also notable for GERD, HLD, HTN, tremor, venous insufficiency, Elvin-Parkinson-White, hysterectomy, and left ORIF of femur fracture.    PAST MEDICAL HISTORY:   Past Medical History:   Diagnosis Date     Age-related osteoporosis without current pathological fracture      Dementia (H)      Gastroesophageal reflux disease      Hypercalcemia      Hyperlipidemia      Hypertension      Late onset Alzheimer's disease without behavioral disturbance (H)      Lipidemia      Osteoporosis      Tremor      Venous insufficiency      Vitamin D deficiency      Elvin-Parkinson-White (WPW) pattern        PAST SURGICAL HISTORY:   Past Surgical History:   Procedure Laterality Date     HYSTERECTOMY       OPEN REDUCTION INTERNAL FIXATION RODDING INTRAMEDULLARY FEMUR Left 6/26/2021    Procedure: LEFT OPEN REDUCTION INTERNAL FIXATION, FRACTURE, FEMUR, USING INTRAMEDULLARY MICHELINE;   Surgeon: Elizabeth Mckenzie MD;  Location: [facility] OR       FAMILY HISTORY: No family history on file.    SOCIAL HISTORY:   Social History     Tobacco Use     Smoking status: Former Smoker     Smokeless tobacco: Never Used   Substance Use Topics     Alcohol use: Not Currently       MEDICATIONS:  Current Outpatient Medications   Medication Sig Dispense Refill     acetaminophen (TYLENOL) 325 MG tablet Take 3 tablets (975 mg) by mouth every 8 hours as needed for mild pain 60 tablet 0     alendronate (FOSAMAX) 70 MG tablet TAKE ONE TABLET BY MOUTH EVERY 7 DAYS 4 tablet 97     ASPIRIN LOW DOSE 81 MG chewable tablet TAKE 1 TABLET BY MOUTH EVERY MORNING 36 tablet PRN     atorvastatin (LIPITOR) 10 MG tablet TAKE 1 TABLET BY MOUTH ONCE DAILY 28 tablet PRN     CALCIUM ANTACID 500 MG chewable tablet TAKE 1 TABLET BY MOUTH ONCE DAILY 30 tablet 11     citalopram (CELEXA) 20 MG tablet TAKE ONE AND ONE-HALF TABLETS (30 MG) BY MOUTH ONCE DAILY 135 tablet 11     cyanocobalamin (VITAMIN B-12) 100 MCG tablet TAKE 1 TABLET BY MOUTH ONCE DAILY 31 tablet 11     divalproex sodium delayed-release (DEPAKOTE SPRINKLE) 125 MG DR capsule Take 125 mg by mouth 2 times daily (dx:  Psychosis NOS)       donepezil (ARICEPT) 5 MG tablet TAKE 1 TABLET BY MOUTH ONCE DAILY 90 tablet 3     Multiple Vitamins-Minerals (PRESERVISION AREDS) TABS TAKE 1 TABLET BY MOUTH ONCE DAILY 28 tablet PRN     polyethylene glycol (MIRALAX) 17 GM/Dose powder MIX 17GM OF POWDER IN 8OZ OF WATER UNTIL COMPLETELY DISSOLVED. DRINK SOLUTION BY MOUTH ONCE DAILY 510 g 97     QUEtiapine (SEROQUEL) 50 MG tablet Take 50mg TID(9a, 4p, 8p) and 50mg TID PRN.       vitamin D2 (ERGOCALCIFEROL) 92771 units (1250 mcg) capsule TAKE ONE CAPSULE BY MOUTH ONCE A WEEK 4 capsule PRN       Allergies:  Allergies   Allergen Reactions     Penicillins Anaphylaxis     Penicillins Unknown       ROS: 10 point ROS of systems including Constitutional, Eyes, Respiratory, Cardiovascular, Gastroenterology,  Genitourinary, Integumentary, Muscularskeletal, Psychiatric were all negative except for pertinent positives noted in my HPI.    Physical exam:   Blood pressure (!) 141/75, pulse 83, resp. rate 24, weight 59.4 kg (131 lb), SpO2 93 %.  CV: RRR on telemetry  PULM: breathing comfortably on room air  ABD: soft, non-distended  NEUROLOGIC:  -- Awake; Alert; oriented to name only; not oriented to location or date  -- Follows commands briskly  -- Speech fluent, spontaneous. No aphasia or dysarthria.  Confusion consistent with baseline dementia.  -- no gaze preference. No apparent hemineglect.  Cranial Nerves:  -- visual fields full to confrontation, PERRL 3-2mm bilat and brisk, extraocular movements intact  -- face symmetrical, tongue midline  -- sensory V1-V3 intact bilaterally  -- palate elevates symmetrically, uvula midline  -- hearing grossly intact bilat  -- Trapezii 5/5 strength bilat symmetric    Motor:  Normal bulk / tone; no tremor, rigidity, or bradykinesia.  No muscle wasting or fasciculations  No pronator drift     Delt Bi Tri Hand Flexion/  Extension Iliopsoas Quadriceps Hamstrings Tibialis Anterior Gastroc    C5 C6 C7 C8/T1 L2 L3 L4-S1 L4 S1   R 5 5 5 5 5 5 5 5 5   L 5 5 5 5 5 5 5 5 5   Sensory:  intact to LT x 4 extremities     Reflexes:     Ronn Clonus    UMN UMN   R Norm Neg   L Norm Neg       IMAGING:  CT head 5/26/22:  No acute intracranial pathology.     CT cervical spine 5/26/22:  1. Acute nondisplaced fracture of an osteophyte arising from the  anterior superior C7 vertebral body.  2. Multilevel degenerative changes, most significantly resulting in  moderate bilateral neural foraminal stenosis and mild spinal canal  stenosis at C5-6.    LABS:   Lab Results   Component Value Date    WBC 18.2 05/26/2022    WBC 14.0 06/28/2021     Lab Results   Component Value Date    RBC 3.75 05/26/2022    RBC 2.95 06/28/2021     Lab Results   Component Value Date    HGB 11.3 05/26/2022    HGB 9.0 06/28/2021     Lab  Results   Component Value Date    HCT 34.5 05/26/2022    HCT 26.9 06/28/2021     Lab Results   Component Value Date    MCV 92 05/26/2022    MCV 91 06/28/2021     Lab Results   Component Value Date    MCH 30.1 05/26/2022    MCH 30.5 06/28/2021     Lab Results   Component Value Date    MCHC 32.8 05/26/2022    MCHC 33.5 06/28/2021     Lab Results   Component Value Date    RDW 14.4 05/26/2022    RDW 14.8 06/28/2021     Lab Results   Component Value Date    PLT 1,072 05/26/2022     07/06/2021       Last Comprehensive Metabolic Panel:  Sodium   Date Value Ref Range Status   04/07/2022 137 136 - 145 mmol/L Final   06/28/2021 138 133 - 144 mmol/L Final     Potassium   Date Value Ref Range Status   04/07/2022 4.2 3.5 - 5.0 mmol/L Final   06/28/2021 3.6 3.4 - 5.3 mmol/L Final     Chloride   Date Value Ref Range Status   04/07/2022 100 98 - 107 mmol/L Final   06/28/2021 105 94 - 109 mmol/L Final     Carbon Dioxide   Date Value Ref Range Status   06/28/2021 29 20 - 32 mmol/L Final     Carbon Dioxide (CO2)   Date Value Ref Range Status   04/07/2022 28 22 - 31 mmol/L Final     Anion Gap   Date Value Ref Range Status   04/07/2022 9 5 - 18 mmol/L Final   06/28/2021 4 3 - 14 mmol/L Final     Glucose   Date Value Ref Range Status   04/07/2022 86 70 - 125 mg/dL Final   06/28/2021 99 70 - 99 mg/dL Final     Urea Nitrogen   Date Value Ref Range Status   04/07/2022 25 8 - 28 mg/dL Final   06/28/2021 16 7 - 30 mg/dL Final     Creatinine   Date Value Ref Range Status   04/07/2022 0.66 0.60 - 1.10 mg/dL Final   07/06/2021 0.63 0.52 - 1.04 mg/dL Final     GFR Estimate   Date Value Ref Range Status   04/07/2022 84 >60 mL/min/1.73m2 Final     Comment:     Effective December 21, 2021 eGFRcr in adults is calculated using the 2021 CKD-EPI creatinine equation which includes age and gender (Sulma et al., NEJM, DOI: 10.1056/TITBff8807911)   07/06/2021 81 >60 mL/min/[1.73_m2] Final     Comment:     Non  GFR Calc  Starting  12/18/2018, serum creatinine based estimated GFR (eGFR) will be   calculated using the Chronic Kidney Disease Epidemiology Collaboration   (CKD-EPI) equation.       Calcium   Date Value Ref Range Status   04/07/2022 9.8 8.5 - 10.5 mg/dL Final   06/28/2021 7.8 (L) 8.5 - 10.1 mg/dL Final       INR   Date Value Ref Range Status   06/25/2021 1.03 0.86 - 1.14 Final      No results found for: PTT     ASSESSMENT:  87 year old female with Alzheimer's dementia and osteoporosis presenting after ground-level fall with finding of C7 anterior superior osteophyte fracture.    RECOMMENDATIONS:  - No neurosurgical intervention indicated at this time   - Cervical flexion-extension XR to ensure stability  - No cervical collar necessary currently given likelihood of increasing fall risk for patient  - Avoid sedating medications that would alter a neurological examination    The patient was discussed with Dr. Florentino, neurosurgery chief resident, and he agrees with the above.    Alvin Cárdenas M.D.  Neurosurgery Resident, PGY-3    Please contact neurosurgery resident on call with questions.    Dial * * [phone], enter [phone] when prompted.       Comorbid conditions:  Clinically Significant Risk Factors Present on Admission               # Platelet Defect: home medication list includes an antiplatelet medication

## 2022-05-27 NOTE — H&P
[facility]    History and Physical: Trauma Service       Date of Admission:  5/26/2022    Time of Admission/Consult Request (page/call): 10:00 PM  Time of my evaluation: 1:00 AM  Consulting services:  Neurosurgery - Emergent consult (within 30 mins): Called by ED    Assessment & Plan   Trauma mechanism: GLF  Time/date of injury:   Known Injuries:  1. C7 fracture  2. Contusion right posterior scalp  Other diagnoses:   1. Alzheimers  2. Dementia  3. COVID +  4. Hypokalemia     Plan:  1. Admit to Trauma  2. Follow-up Neurosurgery recommendations  3. PT/OT  4. Med rec  5. Pain control  6. Pulmonary toilet  7. COVID + , asymptomatic  8. Aspen collar   9. Replete K    Code status: DNR/DNI confirmed with daughter     ETOH: This patient was asked if in the last 3-6 months there has been a time when she had 4 or more drinks in a single day/outing.. Patient answer to the screening question was in the negative. No intervention needed.  Primary Care Physician   Tu Temple    Chief Complaint   Fall    History is obtained from the patient and paper chart    History of Present Illness   Debi Morton is a 87 year old female with dementia who lives in a memory care facility. Sh was recently diagnosed with COVID. She is on daily ASA. She was found after a fall. Imaging showed C7 fracture. Fell a few times last few days per report. Patient is a poor historian 2/2 demetia. She denies pain.    Past Medical History    I have reviewed this patient's medical history and updated it with pertinent information if needed.   Past Medical History:   Diagnosis Date     Age-related osteoporosis without current pathological fracture      Dementia (H)      Gastroesophageal reflux disease      Hypercalcemia      Hyperlipidemia      Hypertension      Late onset Alzheimer's disease without behavioral disturbance (H)      Lipidemia      Osteoporosis      Tremor      Venous insufficiency       Vitamin D deficiency      Elvin-Parkinson-White (WPW) pattern        Past Surgical History   I have reviewed this patient's surgical history and updated it with pertinent information if needed.  Past Surgical History:   Procedure Laterality Date     HYSTERECTOMY       OPEN REDUCTION INTERNAL FIXATION RODDING INTRAMEDULLARY FEMUR Left 6/26/2021    Procedure: LEFT OPEN REDUCTION INTERNAL FIXATION, FRACTURE, FEMUR, USING INTRAMEDULLARY MICHELINE;  Surgeon: Elizabeth Mckenzie MD;  Location: [facility] OR     Prior to Admission Medications   Prior to Admission Medications   Prescriptions Last Dose Informant Patient Reported? Taking?   ASPIRIN LOW DOSE 81 MG chewable tablet   No No   Sig: TAKE 1 TABLET BY MOUTH EVERY MORNING   CALCIUM ANTACID 500 MG chewable tablet   No No   Sig: TAKE 1 TABLET BY MOUTH ONCE DAILY   Multiple Vitamins-Minerals (PRESERVISION AREDS) TABS   No No   Sig: TAKE 1 TABLET BY MOUTH ONCE DAILY   QUEtiapine (SEROQUEL) 50 MG tablet   Yes No   Sig: Take 50mg TID(9a, 4p, 8p) and 50mg TID PRN.   acetaminophen (TYLENOL) 325 MG tablet   No No   Sig: Take 3 tablets (975 mg) by mouth every 8 hours as needed for mild pain   alendronate (FOSAMAX) 70 MG tablet   No No   Sig: TAKE ONE TABLET BY MOUTH EVERY 7 DAYS   atorvastatin (LIPITOR) 10 MG tablet   No No   Sig: TAKE 1 TABLET BY MOUTH ONCE DAILY   citalopram (CELEXA) 20 MG tablet   No No   Sig: TAKE ONE AND ONE-HALF TABLETS (30 MG) BY MOUTH ONCE DAILY   cyanocobalamin (VITAMIN B-12) 100 MCG tablet   No No   Sig: TAKE 1 TABLET BY MOUTH ONCE DAILY   divalproex sodium delayed-release (DEPAKOTE SPRINKLE) 125 MG DR capsule   Yes No   Sig: Take 125 mg by mouth 2 times daily (dx:  Psychosis NOS)   donepezil (ARICEPT) 5 MG tablet   No No   Sig: TAKE 1 TABLET BY MOUTH ONCE DAILY   polyethylene glycol (MIRALAX) 17 GM/Dose powder   No No   Sig: MIX 17GM OF POWDER IN 8OZ OF WATER UNTIL COMPLETELY DISSOLVED. DRINK SOLUTION BY MOUTH ONCE DAILY   vitamin D2 (ERGOCALCIFEROL) 76228  units (1250 mcg) capsule   No No   Sig: TAKE ONE CAPSULE BY MOUTH ONCE A WEEK      Facility-Administered Medications: None     Allergies   Allergies   Allergen Reactions     Penicillins Anaphylaxis     Penicillins Unknown       Social History   Social History     Socioeconomic History     Marital status:      Spouse name: Not on file     Number of children: Not on file     Years of education: Not on file     Highest education level: Not on file   Occupational History     Not on file   Tobacco Use     Smoking status: Former Smoker     Smokeless tobacco: Never Used   Substance and Sexual Activity     Alcohol use: Not Currently     Drug use: Never     Sexual activity: Never   Other Topics Concern     Parent/sibling w/ CABG, MI or angioplasty before 65F 55M? Not Asked   Social History Narrative    ** Merged History Encounter **          Social Determinants of Health     Financial Resource Strain: Not on file   Food Insecurity: Not on file   Transportation Needs: Not on file   Physical Activity: Not on file   Stress: Not on file   Social Connections: Not on file   Intimate Partner Violence: Not on file   Housing Stability: Not on file       Family History   Family history reviewed with patient and is noncontributory.    Review of Systems   UO    Physical Exam       BP: (!) 141/75 Pulse: 83   Resp: 24 SpO2: 93 %      Vital Signs with Ranges  Temp:  [98.7  F (37.1  C)] 98.7  F (37.1  C)  Pulse:  [78-85] 83  Resp:  [16-24] 24  BP: (138-149)/(73-82) 141/75  SpO2:  [93 %-95 %] 93 % 131 lbs 0 oz    Primary Survey:  Airway: patient talking  Breathing: symmetric respiratory effort bilaterally  Circulation: central pulses present and peripheral pulses present  Disability: Pupils - left 4 mm and brisk, right 4 mm and brisk     [place] Coma Scale - Total 15/15  Eye Response (E): 4  4= spontaneous,  3= to verbal/voice, 2=  to pain, 1= No response   Verbal Response (V): 5   5= Orientated, converses,  4= Confused, converses, 3=  Inappropriate words,  2= Incomprehensible sounds,  1=No response   Motor Response (M): 6   6= Obeys commands, 5= Localizes to pain, 4= Withdrawal to pain, 3=Fexion to pain, 2= Extension to pain, 1= No response    Secondary Survey:  General: alert, oriented to self  Head: contusion to right parietal scalp  Eyes: PERRLA, pupils 3 mm, EOMI, corneas and conjunctivae clear  Ears: non-inflamed external ear canals  Nose: nares patent, no drainage, nasal septum non-tender  Mouth/Throat: no exudates or erythema,  no dental tenderness or malocclusions, no tongue lacerations  Neck: no cervical collar present. No midline posterior tenderness, full AROM without pain or tenderness   Chest/Pulmonary: normal respiratory rate and rhythm,  bilateral clear breath sounds, no wheezes, rales or rhonchi, no chest wall tenderness or deformities,   Cardiovascular: S1, S2,  normal and regular rate and rhythm, no murmurs  Abdomen: soft, non-tender, no guarding, no rebound tenderness and no tenderness to palpation  : normal external genitalia, pelvis stable to lateral compression,  Back/Spine: no deformity, no midline tenderness, no sacral tenderness,  no step-offs and no abrasions or contusions  Musculoskel/Extremities: normal extremities, full AROM of major joints without tenderness, edema, erythema, ecchymosis, or abrasions   Hand: no gross deformities of hands or fingers. Full AROM of hand and fingers in flexion and extension.  strength equal and symmetric.   Skin: no rashes, laceration, ecchymosis, skin warm and dry.   Neuro: PERRLA, alert, oriented x 1. CN II-XII grossly intact. No focal deficits. Strength 5/5 x 4 extremities.  Sensation intact.  Psychiatric: affect/mood , confused  # Pain Assessment:  Current Pain Score 5/26/2022   Patient currently in pain? denies   Pain score (0-10) -   Debi webster pain level was assessed and she currently denies pain.      Data   UA RESULTS:  Recent Labs   Lab Test 06/30/21 1924   COLOR Yellow    APPEARANCE Slightly Cloudy   URINEGLC Negative   URINEBILI Negative   URINEKETONE 10*   SG 1.030   UBLD Small*   URINEPH 5.5   PROTEIN 20*   NITRITE Negative   LEUKEST Small*   RBCU 2   WBCU 25*       Results for orders placed or performed during the hospital encounter of 05/26/22 (from the past 24 hour(s))   CT Cervical Spine w/o Contrast    Narrative    CT CERVICAL SPINE W/O CONTRAST 5/26/2022 9:31 PM    Provided History: Trauma    Comparison: None available    Technique: Using multidetector thin collimation helical acquisition  technique, axial, coronal and sagittal CT images through the cervical  spine were obtained without intravenous contrast.     Findings:  Acute nondisplaced fracture of the C7 anterior superior osteophyte.  Trace adjacent edema. No additional fracture identified.    Straightening of the normal cervical lordosis. Multilevel joint space  narrowing greatest at C5-6. Grade 1 retrolisthesis of C5 on C6.    The findings on a level by level basis are as follows:    C2-3:  No spinal canal or neural foraminal stenosis.    C3-4:  Right greater than left uncovertebral spurring and facet  arthropathy resulting in mild right foraminal stenosis. No left  foraminal or canal stenosis.    C4-5:  Disc osteophyte complex. Bilateral uncovertebral spurring and  right greater than left facet arthropathy resulting in mild right  foraminal stenosis. No left foraminal or canal stenosis.    C5-6:  Disc osteophyte complex. Bilateral uncovertebral spurring.  Moderate bilateral neural foraminal stenosis. Mild canal stenosis.    C6-7:  Disc osteophyte complex anteriorly with nondisplaced fracture  of the anterior osteophyte. Bilateral uncovertebral spurring and facet  arthropathy resulting in mild bilateral neural foraminal stenosis. No  significant canal stenosis.    C7-T1:  No spinal canal or neural foraminal stenosis.     No abnormality of the paraspinous soft tissues.      Impression    Impression:   1. Acute  nondisplaced fracture of an osteophyte arising from the  anterior superior C7 vertebral body.  2. Multilevel degenerative changes, most significantly resulting in  moderate bilateral neural foraminal stenosis and mild spinal canal  stenosis at C5-6.    [Result: C7 osteophyte fracture]    Finding was identified on 5/26/2022 9:50 PM.     Dr. Butcher was contacted by Dr. Ruiz at 5/26/2022 9:58 PM and  verbalized understanding of the finding.     I have personally reviewed the examination and initial interpretation  and I agree with the findings.    RYNE PETIT MD         SYSTEM ID:  [id number]   CT Head w/o Contrast    Narrative    CT HEAD W/O CONTRAST 5/26/2022 9:32 PM    History: Trauma     Comparison: None    Technique: Using multidetector thin collimation helical acquisition  technique, axial, coronal and sagittal CT images from the skull base  to the vertex were obtained without intravenous contrast.   (topogram) image(s) also obtained and reviewed.    Findings: There is no intracranial hemorrhage, mass effect, or midline  shift. Chronic parenchymal atrophy, greatest in the temporal lobes.  Patchy periventricular hypoattenuation, likely sequela of chronic  small vessel ischemic disease. Gray/white matter differentiation in  both cerebral hemispheres is preserved. Ventricles are proportionate  to the cerebral sulci. The basal cisterns are clear.    Right posterior scalp hematoma. The bony calvaria and the bones of the  skull base are normal. Mucosal thickening and layering fluid in the  maxillary sinuses, ethmoid air cells, and to a lesser extent the  frontal sinuses. Mastoid air cells are clear.      Impression    Impression:  No acute intracranial pathology.     I have personally reviewed the examination and initial interpretation  and I agree with the findings.    RYNE PETIT MD         SYSTEM ID:  [id number]   [place] Draw    Narrative    The following orders were created for panel order [place]  Draw.  Procedure                               Abnormality         Status                     ---------                               -----------         ------                     Extra Blue Top Tube[[id number]]                              Final result               Extra Red Top Tube[[id number]]                               Final result               Extra Green Top (Lithium...[[id number]]                      Final result               Extra Purple Top Tube[[id number]]                            Final result                 Please view results for these tests on the individual orders.   Extra Blue Top Tube   Result Value Ref Range    Hold Specimen JIC    Extra Red Top Tube   Result Value Ref Range    Hold Specimen JIC    Extra Green Top (Lithium Heparin) Tube   Result Value Ref Range    Hold Specimen JIC    Extra Purple Top Tube   Result Value Ref Range    Hold Specimen Done    Comprehensive metabolic panel   Result Value Ref Range    Sodium 136 133 - 144 mmol/L    Potassium 3.0 (L) 3.4 - 5.3 mmol/L    Chloride 101 94 - 109 mmol/L    Carbon Dioxide (CO2) 28 20 - 32 mmol/L    Anion Gap 7 3 - 14 mmol/L    Urea Nitrogen 29 7 - 30 mg/dL    Creatinine 0.68 0.52 - 1.04 mg/dL    Calcium 8.7 8.5 - 10.1 mg/dL    Glucose 107 (H) 70 - 99 mg/dL    Alkaline Phosphatase 85 40 - 150 U/L    AST 28 0 - 45 U/L    ALT 24 0 - 50 U/L    Protein Total 6.1 (L) 6.8 - 8.8 g/dL    Albumin 2.6 (L) 3.4 - 5.0 g/dL    Bilirubin Total 0.6 0.2 - 1.3 mg/dL    GFR Estimate 84 >60 mL/min/1.73m2   CBC with platelets differential    Narrative    The following orders were created for panel order CBC with platelets differential.  Procedure                               Abnormality         Status                     ---------                               -----------         ------                     CBC with platelets and d...[[id number]]  Abnormal            Final result               RBC and Platelet Morphology[[id number]]  Abnormal            Final  result                 Please view results for these tests on the individual orders.   CBC with platelets and differential   Result Value Ref Range    WBC Count 18.2 (H) 4.0 - 11.0 10e3/uL    RBC Count 3.75 (L) 3.80 - 5.20 10e6/uL    Hemoglobin 11.3 (L) 11.7 - 15.7 g/dL    Hematocrit 34.5 (L) 35.0 - 47.0 %    MCV 92 78 - 100 fL    MCH 30.1 26.5 - 33.0 pg    MCHC 32.8 31.5 - 36.5 g/dL    RDW 14.4 10.0 - 15.0 %    Platelet Count 1,072 (HH) 150 - 450 10e3/uL    % Neutrophils 83 %    % Lymphocytes 3 %    % Monocytes 10 %    % Eosinophils 1 %    % Basophils 1 %    % Immature Granulocytes 2 %    NRBCs per 100 WBC 0 <1 /100    Absolute Neutrophils 15.4 (H) 1.6 - 8.3 10e3/uL    Absolute Lymphocytes 0.5 (L) 0.8 - 5.3 10e3/uL    Absolute Monocytes 1.9 (H) 0.0 - 1.3 10e3/uL    Absolute Eosinophils 0.1 0.0 - 0.7 10e3/uL    Absolute Basophils 0.1 0.0 - 0.2 10e3/uL    Absolute Immature Granulocytes 0.3 <=0.4 10e3/uL    Absolute NRBCs 0.0 10e3/uL   RBC and Platelet Morphology   Result Value Ref Range    Platelet Assessment  Automated Count Confirmed. Platelet morphology is normal.     Automated Count Confirmed. Platelet morphology is normal.    [place] Cells Marked (A) None Seen    RBC Morphology Confirmed RBC Indices    XR Cervical Spine Flex/Ext 2/3 Views    Narrative    EXAM: XR CERVICAL SPINE FLEX/EXT 2/3 VW  LOCATION: [facility]  DATE/TIME: 5/26/2022 11:35 PM    INDICATION: Flexion extension XR to assess cervical spine stability WITHOUT cervical collar per Neurosurgery  COMPARISON: None.  TECHNIQUE: CR Cervical Spine.      Impression    IMPRESSION: Redemonstration of acute fracture of anterior superior osteophyte of C7. Flexion extension views demonstrate 2 mm anterior subluxation C3 on C4 in flexion which reduces in extension. Underlying moderate degenerative changes. Normal   prevertebral soft tissues.         Studies:  CT Head w/o Contrast   Final Result   Impression:   No acute  intracranial pathology.       I have personally reviewed the examination and initial interpretation   and I agree with the findings.      RYNE PETIT MD            SYSTEM ID:  [id number]      CT Cervical Spine w/o Contrast   Final Result   Impression:    1. Acute nondisplaced fracture of an osteophyte arising from the   anterior superior C7 vertebral body.   2. Multilevel degenerative changes, most significantly resulting in   moderate bilateral neural foraminal stenosis and mild spinal canal   stenosis at C5-6.      [Result: C7 osteophyte fracture]      Finding was identified on 5/26/2022 9:50 PM.       Dr. Butcher was contacted by Dr. Ruiz at 5/26/2022 9:58 PM and   verbalized understanding of the finding.       I have personally reviewed the examination and initial interpretation   and I agree with the findings.      RYNE PETIT MD            SYSTEM ID:  [id number]        Discussed with trauma staff on call, Dr. Ayala.    Trip Gibson MD  [facility]

## 2022-05-27 NOTE — PROGRESS NOTES
Care Management Follow Up    Length of Stay (days): 1    Expected Discharge Date:  TBD     Concerns to be Addressed:   [place]vin document    Patient plan of care discussed at interdisciplinary rounds: Yes    Anticipated Discharge Disposition: TBD      Anticipated Discharge Services:  TBD  Anticipated Discharge DME:  TBD    Patient/family educated on Medicare website which has current facility and service quality ratings: N/A   Education Provided on the Discharge Plan:  N/A  Patient/Family in Agreement with the Plan:  N/A    Referrals Placed by CM/SW:  None at this time  Private pay costs discussed: insurance costs out of pocket expenses, co-pays and deductibles.     Additional Information:  [id number]  Chart reviewed. Case discussed with bedside RN and medical provider.Pt is covid positive. ROB called bedside nurse and ask if SW to call room will pt be able to talk and go over MOON document? Nurse respond no and suggest SW to call pt's daughter Marisol [phone]    Phone call to pt's daughter Marisol. ROB introduced self, discussed role and explained to Marisol SW needing to go over MOON document for pt, but due to covid restriction SW is not able to meet with pt at bedside. SW discuss  IRVIN document over the phone with Marisol. SW answered any questions regarding insurance coverage. ROB encouraged Marisol to follow up with Debi's insurance plan directly to receive the most accurate information. IRVIN form could not be signed due to ovid restriction. ROB date form reviwed and placed form in the patient's chart.     will continue to follow for discharge planning, support, and resources.    _______________________    PRANAV Stephenson, LSW  ED/OBS   [facility]  Phone: [phone]  Pager: [phone]  Fax: [phone]     On-call pager, [phone], 4:00 pm to midnight

## 2022-05-28 NOTE — PROGRESS NOTES
For the most part, Debi is very pleasantly confused but cooperative.  Does not appear to be in any pain.  Occasionally thinks that there are men after she and her dad, which upsets her.  Debi can hit and kick, but is fairly easily re-directed.  She has pulled two IV's out today however.  Resting in bed most of day.  Will eat with assistance/feeding.

## 2022-05-28 NOTE — PROGRESS NOTES
About 4 pm, Debi became very combative.  She has pulled 3 IV's out today despite having a sitter and having IV's wrapped in [place].  Debi is constantly picking at anything on her skin, at her clothes, undergarment pads etc.  Will try using hand mitts.

## 2022-05-28 NOTE — PHARMACY-ADMISSION MEDICATION HISTORY
Admission Medication History Completed by Pharmacy    See [facility] Admission Navigator for allergy information, preferred outpatient pharmacy, prior to admission medications and immunization status.     Medication History Sources:     [facility] [phone]], dispense report    Changes made to PTA medication list (reason):    Added: None    Deleted: None    Changed: None    Additional Information:    [facility] sent their MAR with her current medications and their administration dates/times. The patient has been here since 2020.    Prior to Admission medications    Medication Sig Last Dose Taking? Auth Provider   acetaminophen (TYLENOL) 325 MG tablet Take 975 mg by mouth every 8 hours as needed for mild pain Unknown at PRN Yes Unknown, Entered By History   alendronate (FOSAMAX) 70 MG tablet TAKE ONE TABLET BY MOUTH EVERY 7 DAYS 5/27/2022 at Unknown time Yes Tu Temple APRN CNP   ASPIRIN LOW DOSE 81 MG chewable tablet TAKE 1 TABLET BY MOUTH EVERY MORNING 5/27/2022 at Unknown time Yes Tu Temple APRN CNP   atorvastatin (LIPITOR) 10 MG tablet TAKE 1 TABLET BY MOUTH ONCE DAILY 5/27/2022 at Unknown time Yes Tu Temple APRN CNP   CALCIUM ANTACID 500 MG chewable tablet TAKE 1 TABLET BY MOUTH ONCE DAILY 5/27/2022 at Unknown time Yes Tu Temple APRN CNP   citalopram (CELEXA) 20 MG tablet TAKE ONE AND ONE-HALF TABLETS (30 MG) BY MOUTH ONCE DAILY 5/27/2022 at Unknown time Yes Tu Temple APRN CNP   cyanocobalamin (VITAMIN B-12) 100 MCG tablet TAKE 1 TABLET BY MOUTH ONCE DAILY 5/27/2022 at Unknown time Yes Tu Temple APRN CNP   divalproex sodium delayed-release (DEPAKOTE SPRINKLE) 125 MG DR capsule Take 125 mg by mouth 2 times daily (dx:  Psychosis NOS) 5/27/2022 at Unknown time Yes Reported, Patient   donepezil (ARICEPT) 5 MG tablet TAKE 1 TABLET BY MOUTH ONCE DAILY 5/27/2022 at Unknown time Yes Tu Temple APRN  CNP   Multiple Vitamins-Minerals (PRESERVISION AREDS) TABS TAKE 1 TABLET BY MOUTH ONCE DAILY 5/27/2022 at Unknown time Yes Tu Temple APRN CNP   polyethylene glycol (MIRALAX) 17 GM/Dose powder MIX 17GM OF POWDER IN 8OZ OF WATER UNTIL COMPLETELY DISSOLVED. DRINK SOLUTION BY MOUTH ONCE DAILY 5/27/2022 at Unknown time Yes Tu Temple APRN CNP   QUEtiapine (SEROQUEL) 50 MG tablet Take 50mg TID(9a, 4p, 8p) and 50mg TID PRN. 5/27/2022 at Unknown time Yes Reported, Patient   vitamin D2 (ERGOCALCIFEROL) 44155 units (1250 mcg) capsule TAKE ONE CAPSULE BY MOUTH ONCE A WEEK 5/27/2022 at Unknown time Yes Tu Temple APRN CNP       Date completed: 05/28/22    Medication history completed by: Montserrat Grant
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
## 2022-05-28 NOTE — PROGRESS NOTES
Care Management Follow Up    Length of Stay (days): 1    Expected Discharge Date: 5/31/2022     Concerns to be Addressed: Discharge Planning   Patient plan of care discussed at interdisciplinary rounds: No    Anticipated Discharge Disposition:       Anticipated Discharge Services: Return to Memory Care Unit at [facility]  Anticipated Discharge DME:      Patient/family educated on Medicare website which has current facility and service quality ratings:    Education Provided on the Discharge Plan:    Patient/Family in Agreement with the Plan:      Referrals Placed by CM/SW:    Private pay costs discussed: Not applicable    Additional Information:  Weekend  f/u requested by Trauma Service to assist with discharge planning. Pt is medically ready to return to her Memory Care [facility]. Pt resides at [facility] ([address] [place] [address]). Called and spoke to RNMary ([phone] f: [phone]). [facility] is unable to accept back until Tuesday due to staffing. Writer updated Trauma. Family still needs to be updated on plan.       Shira Chua, NÉSTORSW

## 2022-05-29 NOTE — ED NOTES
"Updated the patient's daughter, Marisol, on patient's status and currently behavior. Informed the daughter, the patient has been cleared medically by trauma to return back to her memory care unit, but the unit declined patient return until Tuesday 5/31/2022. Marisol was upset by this, stating \"I will contact them as this is inappropriate\". All questions answered and Marisol verbalized understanding, encouraged to call back if she had any question or if she would like to speak with her mother.     "

## 2022-05-29 NOTE — PROGRESS NOTES
Writer informed by 1:1 that patient has pulled out IV. Will attempt to page MD about this and if IV is really needed.     Call later received from provider. Dr. Quick, ok for patient not to have IV, due to multiple IVs and removals by pateint. Also, order for ceftriaxone changed to IV as patient does not have an IV.
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
## 2022-05-30 NOTE — PROGRESS NOTES
Neuro: Alert, oriented to self. Baseline d/t dementia  Cardiac: No tele ordered. Hypertensive but did not require PRNs /64 (BP Location: Left arm)   Pulse 76   Temp 98  F (36.7  C) (Oral)   Resp 16   Wt 56.3 kg (124 lb 1.9 oz)   SpO2 95%   BMI 20.03 kg/m    Respiratory: RA  GI/: Void x1. No BM this shift  Diet/appetite: Reg diet  Activity:  Assist of 1  Pain: At acceptable level on current regimen. Admitted to 1/10 pain in R posterior scalp/hematoma site  Skin: Generalized bruising, rash on back/shoulders, raised bump and bruise on R posterior scalp  LDA's: No access upon arrival to floor. No active order for IV    Plan: Continue with POC. Notify primary team with changes.

## 2022-05-30 NOTE — PLAN OF CARE
PRIMARY DIAGNOSIS: TRAUMA MANAGEMENT    OUTPATIENT/OBSERVATION GOALS TO BE MET BEFORE DISCHARGE    Acute Traumatic Pain     1.  Pain controlled with oral analgesia: Yes      2.  Vital signs stable: Yes      3.  Diagnotic testing complete: Yes      4.  Cleared from consultants (if applicable): Yes      5. Return to near baseline physical activity: Yes    Trauma Cervical Spine Clearance    1.  Diagnostic testing complete: Yes      2.  Cervical spine cleared: Yes      3.  Cleared by Ortho/Neurosurgery: Yes      4.  Return to near baseline physical activity: Yes    Trauma Closed Head Injury    1.  Diagnostic testing complete: Yes      2.  OT TBI Screen complete and consideration of outpatient treatment plan (if applicable): Yes      3.  Vital signs stable: Yes      4.  Adequate pain control on analgesics: Yes      5.  Return to near baseline physical activity: Yes    Discharge Planner Nurse   Safe discharge environment identified: Yes  Barriers to discharge: No       Entered by: Bree Carson RN 05/30/2022 12:00 PM     Please review provider order for any additional goals.  Nurse to notify provider when observation goals have been met and patient is ready for discharge.  Plan is to discharge to memory care unit tomorrow.
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
## 2022-05-30 NOTE — PROGRESS NOTES
Care Management Follow Up    Length of Stay (days): 1    Expected Discharge Date: 05/31/2022     Concerns to be Addressed:   Transportation, confirmation of discharge plan    Patient plan of care discussed at interdisciplinary rounds: No, weekend    Anticipated Discharge Disposition:  Return to memory care facility  [facility] [address] [place] [address]  p: [phone] f: [phone]    Anticipated Discharge Services:  Memory care  Anticipated Discharge DME:      Patient/family educated on Medicare website which has current facility and service quality ratings:  n/a  Education Provided on the Discharge Plan:  yes  Patient/Family in Agreement with the Plan:  yes    Referrals Placed by CM/SW:  N/a  Private pay costs discussed: transportation costs    Additional Information:  ROB following for discharge planning. ROB paged and spoke with Trauma team regarding plan for Tuesday 5/31 discharge.  Trauma staff stated that they had yet to see patient but anticipate she will still be medically ready to return to [facility].  ROB contacted [facility] and left a voicemail requesting confirmation of proper staffing on 5/31 to accept patient back.  ROB did not hear confirmation back from [facility] today, likely due to the holiday.    ROB called and spoke with daughter Marisol who is in agreement with this discharge plan.  ROB discussed transportation options with Marisol, who stated that family would prefer use of [facility] transport due to COVID+ status.  ROB spoke to bedside RN who confirmed continued agitation so patient will likely need stretcher transport.  ROB informed daughter Marisol of need for stretcher and discussed the costs of this mode of transport.  Daughter Marisol plans to contact patient's insurance Tuesday a.m. for clarity on her medical transportation coverage.    ROB unable to arrange discharge transportation today due to not hearing back from [facility] to confirm staffing for 5/31.      ROB  team will continue to follow for discharge planning.    GREGORIA Leon, LICSW  Weekend Adult Acute Care     Searchble on [facility] - search SOCIAL WORK - for text paging options    4A, 4C, 4E, 5A and 5B; pager [phone]  6A, 6B, 6C and 6D; pager [phone]  7A, 7B, 7C, 7D and 5C; pager [phone]  [facility] pager: [phone]     RNCC/Care Coordinator; job code [id number] or [phone]    -For hours 1600 - midnight Pager: [phone]      ANALIA Beckett
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
## 2022-05-30 NOTE — PLAN OF CARE
PRIMARY DIAGNOSIS: TRAUMA MANAGEMENT    OUTPATIENT/OBSERVATION GOALS TO BE MET BEFORE DISCHARGE    Acute Traumatic Pain     1.  Pain controlled with oral analgesia: Yes      2.  Vital signs stable: Yes      3.  Diagnotic testing complete: Yes      4.  Cleared from consultants (if applicable): Yes      5. Return to near baseline physical activity: No    Trauma Cervical Spine Clearance    1.  Diagnostic testing complete: Yes      2.  Cervical spine cleared: Yes      3.  Cleared by Ortho/Neurosurgery: Yes      4.  Return to near baseline physical activity: No    Trauma Closed Head Injury    1.  Diagnostic testing complete: Yes      2.  OT TBI Screen complete and consideration of outpatient treatment plan (if applicable): Yes      3.  Vital signs stable: Yes      4.  Adequate pain control on analgesics: Yes      5.  Return to near baseline physical activity: No    Discharge Planner Nurse   Safe discharge environment identified: Yes  Barriers to discharge: Yes          Entered by: Vianey Redmna RN 05/30/2022 2:28 AM     Please review provider order for any additional goals.  Nurse to notify provider when observation goals have been met and patient is ready for discharge.  WAITING FOR MEMORY CARE UNIT TO APPROVE RETURN

## 2022-05-30 NOTE — PLAN OF CARE
PRIMARY DIAGNOSIS: TRAUMA MANAGEMENT    OUTPATIENT/OBSERVATION GOALS TO BE MET BEFORE DISCHARGE    Acute Traumatic Pain     1.  Pain controlled with oral analgesia: Yes      2.  Vital signs stable: Yes      3.  Diagnotic testing complete: Yes      4.  Cleared from consultants (if applicable): Yes      5. Return to near baseline physical activity: No    Trauma Cervical Spine Clearance    1.  Diagnostic testing complete: Yes      2.  Cervical spine cleared: Yes      3.  Cleared by Ortho/Neurosurgery: Yes      4.  Return to near baseline physical activity: No    Trauma Closed Head Injury    1.  Diagnostic testing complete: Yes      2.  OT TBI Screen complete and consideration of outpatient treatment plan (if applicable): Yes      3.  Vital signs stable: Yes      4.  Adequate pain control on analgesics: Yes      5.  Return to near baseline physical activity: No  Discharge Planner Nurse   Safe discharge environment identified: Yes  Barriers to discharge: Yes       Entered by: Vianey Redman RN 05/30/2022 4:40 AM     Please review provider order for any additional goals.  Nurse to notify provider when observation goals have been met and patient is ready for discharge.  WAITING FOR MEMORY CARE UNIT TO APPROVE RETURN

## 2022-05-31 NOTE — DISCHARGE INSTRUCTIONS
Should be COVID recovered effective today 05/31/2022 (10days) per hospital protocol for asymptomatic patients. Was positive 05/21/2022

## 2022-05-31 NOTE — PLAN OF CARE
Shift Updates: Afebrile and VSS on RA. A&O x1, only to self. Emotionally labile. Mood ranged from combative to calm and cooperative. Refused full assessment. Gave PRN Seroquel x2. Incontinent of bladder and bowel. No BM overnight. Slept fairly well. Planned to discharge today at back to memory care facility.    ________________________________________________________________________________________________________________________     PRIMARY DIAGNOSIS: TRAUMA MANAGEMENT     OUTPATIENT/OBSERVATION GOALS TO BE MET BEFORE DISCHARGE     Acute Traumatic Pain     1.  Pain controlled with oral analgesia: Yes       2.  Vital signs stable: Yes       3.  Diagnotic testing complete: Yes       4.  Cleared from consultants (if applicable): Yes       5. Return to near baseline physical activity: Yes     Trauma Cervical Spine Clearance    1.  Diagnostic testing complete: Yes       2.  Cervical spine cleared: Yes       3.  Cleared by Ortho/Neurosurgery: Yes       4.  Return to near baseline physical activity: Yes     Trauma Closed Head Injury    1.  Diagnostic testing complete: Yes       2.  OT TBI Screen complete and consideration of outpatient treatment plan (if applicable): Yes       3.  Vital signs stable: Yes       4.  Adequate pain control on analgesics: Yes       5.  Return to near baseline physical activity: Yes     Discharge Planner Nurse   Safe discharge environment identified: Yes  Barriers to discharge: None  Please review provider order for any additional goals.  Nurse to notify provider when observation goals have been met and patient is ready for discharge.  Plan is to discharge to memory care unit tomorrow.

## 2022-05-31 NOTE — PROGRESS NOTES
DISCHARGE                            5/31/2022 1328  ----------------------------------------------------------------------------  Discharged to: [facility]  Via: [facility] transportation  Accompanied by: [facility] transportation  Discharge Instructions: regular diet, activity, medications, follow up appointments, when to call the MD, aftercare instructions.  Prescriptions: To be filled by discharge pharmacy; medication list reviewed & sent with pt  Follow Up Appointments: arranged; information given  Belongings: no belongings brought to hospital with patient  IV: d/c'd  Telemetry: d/c'd  Report given to receiving facility. Verbalized understanding. All questions answered.    Discharge Paperwork: Signed, copied, and sent home with patient.

## 2022-05-31 NOTE — PLAN OF CARE
PRIMARY DIAGNOSIS: TRAUMA MANAGEMENT     OUTPATIENT/OBSERVATION GOALS TO BE MET BEFORE DISCHARGE     Acute Traumatic Pain     1.  Pain controlled with oral analgesia: Yes       2.  Vital signs stable: Yes       3.  Diagnotic testing complete: Yes       4.  Cleared from consultants (if applicable): Yes       5. Return to near baseline physical activity: Yes     Trauma Cervical Spine Clearance    1.  Diagnostic testing complete: Yes       2.  Cervical spine cleared: Yes       3.  Cleared by Ortho/Neurosurgery: Yes       4.  Return to near baseline physical activity: Yes     Trauma Closed Head Injury    1.  Diagnostic testing complete: Yes       2.  OT TBI Screen complete and consideration of outpatient treatment plan (if applicable): Yes       3.  Vital signs stable: Yes       4.  Adequate pain control on analgesics: Yes       5.  Return to near baseline physical activity: Yes     Discharge Planner Nurse   Safe discharge environment identified: Yes  Barriers to discharge: None  Please review provider order for any additional goals.  Nurse to notify provider when observation goals have been met and patient is ready for discharge.  Plan is to discharge to memory care unit tomorrow.

## 2022-05-31 NOTE — PLAN OF CARE
PRIMARY DIAGNOSIS: TRAUMA MANAGEMENT    OUTPATIENT/OBSERVATION GOALS TO BE MET BEFORE DISCHARGE    Acute Traumatic Pain     1.  Pain controlled with oral analgesia: Yes      2.  Vital signs stable: Yes      3.  Diagnotic testing complete: Yes      4.  Cleared from consultants (if applicable): Yes      5. Return to near baseline physical activity: Yes    Trauma Cervical Spine Clearance    1.  Diagnostic testing complete: Yes      2.  Cervical spine cleared: Yes      3.  Cleared by Ortho/Neurosurgery: Yes      4.  Return to near baseline physical activity: Yes    Trauma Closed Head Injury    1.  Diagnostic testing complete: Yes      2.  OT TBI Screen complete and consideration of outpatient treatment plan (if applicable): Yes      3.  Vital signs stable: Yes      4.  Adequate pain control on analgesics: Yes      5.  Return to near baseline physical activity: Yes    Discharge Planner Nurse   Safe discharge environment identified: Yes  Barriers to discharge: No       Entered by: Bree Carson RN 05/31/2022 8:42 AM     Please review provider order for any additional goals.  Nurse to notify provider when observation goals have been met and patient is ready for discharge.  Plan is to discharge via [facility] transport to memory care unit today.

## 2022-05-31 NOTE — DISCHARGE SUMMARY
[facility]    Discharge Summary  Trauma Surgery Service    Date of Admission:  5/26/2022  Date of Discharge:  5/31/2022  Attending Physician: Dr. Ishmael Kasper  Discharging Provider: Rhonda Mckee CNP  Date of Service (when I saw the patient): 05/31/22    Primary Provider: Tu Temple  Primary Care clinic: [address] [place] [id number]  Phone: [phone]  Fax number: [phone]     Discharge Diagnoses   Closed nondisplaced fracture of seventh cervical vertebra, unspecified fracture morphology, initial encounter (H)  Closed head injury, initial encounter  Infection due to 2019 novel coronavirus  Fall, initial encounter  Acute cystitis without hematuria    Hospital Course   Debi Morton is a 87 year old female  with history of age-related osteoporosis and Alzheimer's dementia on Aspirin daily who presented after ground-level fall at her memory care facility with  finding of anterior superior C7 osteophyte fracture on CT.  Of note, she was also COVID-positive as of 5/21/22. She was admitted for management of her injuries. He hospital course and discharge plan below:     C7 osteophyte fracture  Debi Morton was evaluated by Neurosurgery and managed non-operatively for her fractures. No cervical collar recommended given likelihood of increasing fall risk. Follow up with Neurosurgery at the clinic in 2 weeks with repeat flexion/extension XR.    # COVID 19 +, 5/21/22  #  EColi UTI    Urine cultures 10,000-50,000 Ecoli. Pan sensitive  Was started on Ceftriaxone then transitioned to Bactrim DS bid x 5 days total treatment.    No changes were made to her other home medications. Continue as prior   Code Status   DNR / DNI    SUBJECTIVE: Prior to discharge Debi was able to tolerate a diet and ambulate into the room with stand by assistance.    Physical Exam   Temp: 97.9  F (36.6  C) Temp src: Oral BP: (!) 166/79 Pulse: 67   Resp: 18   O2 Device: None  (Room air)    Vitals:    05/29/22 2244 05/30/22 0620 05/31/22 0100   Weight: 56 kg (123 lb 7.3 oz) 56.3 kg (124 lb 1.9 oz) 56.1 kg (123 lb 10.9 oz)     Vital Signs with Ranges  Temp:  [97.5  F (36.4  C)-98.3  F (36.8  C)] 97.9  F (36.6  C)  Pulse:  [67-71] 67  Resp:  [16-18] 18  BP: (113-166)/(64-79) 166/79  Cuff Mean (mmHg):  [107] 107  I/O last 3 completed shifts:  In: 670 [P.O.:670]  Out: -     Constitutional: Awake, alert  ENT: Normocephalic, right occipitoparietal scalp hematoma  Respiratory: No increased work of breathing, good air exchange, clear to auscultation bilaterally, no crackles or wheezing.  Cardiovascular: Normal apical impulse, regular rate and rhythm, normal S1 and S2,  GI: No scars, normal bowel sounds, soft, non-distended,   Genitourinary:  Not seen  Skin: Warm and dry, right scalp hematoma  Musculoskeletal: There is no redness, warmth, or swelling of the joints.  Full range of motion noted.  Motor strength is 5 out of 5 all extremities bilaterally.   Neurologic: Awake, alert, oriented to self, behaviors wax and wanes, pleasant vs agitated at times  Neuropsychiatric:waxes and wanes between being pleasant vs irritable.    Discharge Disposition   Discharged to home, Memory Care Unit  Condition at discharge: Stable  Discharge VS: Blood pressure (!) 166/79, pulse 67, temperature 97.9  F (36.6  C), temperature source Oral, resp. rate 18, weight 56.1 kg (123 lb 10.9 oz), SpO2 97 %.    Consultations This Hospital Stay   OCCUPATIONAL THERAPY ADULT IP CONSULT  PHYSICAL THERAPY ADULT IP CONSULT  MEDICATION HISTORY IP PHARMACY CONSULT  PALLIATIVE CARE ADULT IP CONSULT  CARE MANAGEMENT / SOCIAL WORK IP CONSULT    Discharge Orders      XR Cervical Spine Flex/Ext 2/3 Views    Obtain upright cervical flex extension XR     Reason for your hospital stay    Fall  Acute C7 osteophyte fracture     Activity    Your activity upon discharge: activity as tolerated     Adult [facility] Follow-up and recommended labs and  "tests    Follow up with your primary care provider for continued medical care and hospital follow up in 5-10 days.    Neurosurgery Clinic: in 2 weeks with repeat flexion/extension XR  [facility]  [address]   [address] [place] [address]  Appointments: [phone]    You have been involved in a recent trauma incident resulting in an injury.  Studies show us that people affected by trauma have higher levels of post-traumatic stress disorder (PTSD) and/or depressive symptoms during the year following an injury.     Please consider the following.  Have you:  Had migraines about the event(s) or thought about the event(s) when you didn't want to?  Tried hard not to think about the event(s) or went out of your way to avoid situations that reminded you of the event(s)?  Been constantly on guard, watchful, or easily startled?  Felt numb or detached from people, activities, or your surroundings?   Felt guilty or unable to stop blaming yourself or others for the event(s) or any problems the event (s) may have caused?    If you answered \"yes\" to 3 or more of these questions, or if you simply want to discuss any of your feelings further, we recommend that you talk with your Primary Care Provider or a mental health professional.        Appointments on [place] and/or [facility] (with [facility] or [facility] provider or service). Call [phone] if you haven't heard regarding these appointments within 7 days of discharge.     Diet    Follow this diet upon discharge: Regular     Discharge Medications   Current Discharge Medication List      START taking these medications    Details   sulfamethoxazole-trimethoprim (BACTRIM DS) 800-160 MG tablet Take 1 tablet by mouth 2 times daily for 3 doses  Qty: 3 tablet, Refills: 0    Associated Diagnoses: Acute cystitis without hematuria         CONTINUE these medications which have NOT CHANGED    Details   acetaminophen (TYLENOL) 325 MG tablet Take 975 mg by " mouth every 8 hours as needed for mild pain      alendronate (FOSAMAX) 70 MG tablet TAKE ONE TABLET BY MOUTH EVERY 7 DAYS  Qty: 4 tablet, Refills: 97    Associated Diagnoses: Age-related osteoporosis without current pathological fracture      ASPIRIN LOW DOSE 81 MG chewable tablet TAKE 1 TABLET BY MOUTH EVERY MORNING  Qty: 36 tablet, Refills: PRN    Associated Diagnoses: Essential hypertension      atorvastatin (LIPITOR) 10 MG tablet TAKE 1 TABLET BY MOUTH ONCE DAILY  Qty: 28 tablet, Refills: PRN    Associated Diagnoses: Hyperlipidemia LDL goal <130      CALCIUM ANTACID 500 MG chewable tablet TAKE 1 TABLET BY MOUTH ONCE DAILY  Qty: 30 tablet, Refills: 11    Associated Diagnoses: Indigestion      citalopram (CELEXA) 20 MG tablet TAKE ONE AND ONE-HALF TABLETS (30 MG) BY MOUTH ONCE DAILY  Qty: 135 tablet, Refills: 11    Comments: CYCLE FILL REQUEST FOR CYCLE THAT STARTS 06/09/2022  Associated Diagnoses: Late onset Alzheimer's dementia with behavioral disturbance (H)      cyanocobalamin (VITAMIN B-12) 100 MCG tablet TAKE 1 TABLET BY MOUTH ONCE DAILY  Qty: 31 tablet, Refills: 11    Associated Diagnoses: Anemia, unspecified      divalproex sodium delayed-release (DEPAKOTE SPRINKLE) 125 MG DR capsule Take 125 mg by mouth 2 times daily (dx:  Psychosis NOS)      donepezil (ARICEPT) 5 MG tablet TAKE 1 TABLET BY MOUTH ONCE DAILY  Qty: 90 tablet, Refills: 3    Comments: CYCLE FILL REQUEST FOR CYCLE THAT STARTS 06/09/2022  Associated Diagnoses: Late onset Alzheimer's dementia with behavioral disturbance (H)      Multiple Vitamins-Minerals (PRESERVISION AREDS) TABS TAKE 1 TABLET BY MOUTH ONCE DAILY  Qty: 28 tablet, Refills: PRN    Associated Diagnoses: Macular degeneration (senile) of retina      polyethylene glycol (MIRALAX) 17 GM/Dose powder MIX 17GM OF POWDER IN 8OZ OF WATER UNTIL COMPLETELY DISSOLVED. DRINK SOLUTION BY MOUTH ONCE DAILY  Qty: 510 g, Refills: 97    Associated Diagnoses: Closed fracture of left hip, initial  encounter (H)      !! QUEtiapine (SEROQUEL) 50 MG tablet Take 50mg TID(9a, 4p, 8p) and 50mg TID PRN.      vitamin D2 (ERGOCALCIFEROL) 41326 units (1250 mcg) capsule TAKE ONE CAPSULE BY MOUTH ONCE A WEEK  Qty: 4 capsule, Refills: PRN    Associated Diagnoses: Vitamin deficiency      !! QUEtiapine (SEROQUEL) 100 MG tablet TAKE ONE-HALF TABLET (50MG) BY MOUTH THREE TIMES DAILY AS NEEDED   Qty: 15 tablet, Refills: 97    Comments: LAST FILLED 05/12/2022  Associated Diagnoses: Late onset Alzheimer's dementia with behavioral disturbance (H)       !! - Potential duplicate medications found. Please discuss with provider.        Allergies   Allergies   Allergen Reactions     Penicillins Anaphylaxis     Tolerated Ancef 6/2021     Penicillins Unknown     Data   Most Recent 3 CBC's:  Recent Labs   Lab Test 05/31/22  0528 05/30/22  0657 05/29/22  0630   WBC 13.8* 13.7* 14.4*   HGB 11.4* 11.7 11.5*   MCV 93 93 92   PLT 1,053* 1,159* 1,182*      Most Recent 3 BMP's:  Recent Labs   Lab Test 05/31/22  0528 05/30/22  0657 05/29/22  0630    137 141   POTASSIUM 4.3 4.2 3.8   CHLORIDE 102 104 105   CO2 29 29 31   BUN 17 20 25   CR 0.70 0.68 0.52   ANIONGAP 5 4 5   TAY 8.5 8.4* 8.4*   GLC 78 79 79     Most Recent 2 LFT's:  Recent Labs   Lab Test 05/26/22  2136 11/16/20  1215   AST 28 16   ALT 24 9   ALKPHOS 85  --    BILITOTAL 0.6  --      Most Recent INR's and Anticoagulation Dosing History:  Anticoagulation Dose History     Recent Dosing and Labs Latest Ref Rng & Units 6/25/2021    INR 0.86 - 1.14 1.03        Most Recent 3 Troponin's:No lab results found.  Most Recent 6 Bacteria Isolates From Any Culture (See EPIC Reports for Culture Details):  Recent Labs   Lab Test 06/30/21  1924   CULT >100,000 colonies/mL  Escherichia coli  *     Most Recent TSH, T4 and A1c Labs:No lab results found.  Results for orders placed or performed during the hospital encounter of 05/26/22   CT Head w/o Contrast    Narrative    CT HEAD W/O CONTRAST  5/26/2022 9:32 PM    History: Trauma     Comparison: None    Technique: Using multidetector thin collimation helical acquisition  technique, axial, coronal and sagittal CT images from the skull base  to the vertex were obtained without intravenous contrast.   (topogram) image(s) also obtained and reviewed.    Findings: There is no intracranial hemorrhage, mass effect, or midline  shift. Chronic parenchymal atrophy, greatest in the temporal lobes.  Patchy periventricular hypoattenuation, likely sequela of chronic  small vessel ischemic disease. Gray/white matter differentiation in  both cerebral hemispheres is preserved. Ventricles are proportionate  to the cerebral sulci. The basal cisterns are clear.    Right posterior scalp hematoma. The bony calvaria and the bones of the  skull base are normal. Mucosal thickening and layering fluid in the  maxillary sinuses, ethmoid air cells, and to a lesser extent the  frontal sinuses. Mastoid air cells are clear.      Impression    Impression:  No acute intracranial pathology.     I have personally reviewed the examination and initial interpretation  and I agree with the findings.    RYNE PETIT MD         SYSTEM ID:  [id number]   CT Cervical Spine w/o Contrast    Narrative    CT CERVICAL SPINE W/O CONTRAST 5/26/2022 9:31 PM    Provided History: Trauma    Comparison: None available    Technique: Using multidetector thin collimation helical acquisition  technique, axial, coronal and sagittal CT images through the cervical  spine were obtained without intravenous contrast.     Findings:  Acute nondisplaced fracture of the C7 anterior superior osteophyte.  Trace adjacent edema. No additional fracture identified.    Straightening of the normal cervical lordosis. Multilevel joint space  narrowing greatest at C5-6. Grade 1 retrolisthesis of C5 on C6.    The findings on a level by level basis are as follows:    C2-3:  No spinal canal or neural foraminal stenosis.    C3-4:   Right greater than left uncovertebral spurring and facet  arthropathy resulting in mild right foraminal stenosis. No left  foraminal or canal stenosis.    C4-5:  Disc osteophyte complex. Bilateral uncovertebral spurring and  right greater than left facet arthropathy resulting in mild right  foraminal stenosis. No left foraminal or canal stenosis.    C5-6:  Disc osteophyte complex. Bilateral uncovertebral spurring.  Moderate bilateral neural foraminal stenosis. Mild canal stenosis.    C6-7:  Disc osteophyte complex anteriorly with nondisplaced fracture  of the anterior osteophyte. Bilateral uncovertebral spurring and facet  arthropathy resulting in mild bilateral neural foraminal stenosis. No  significant canal stenosis.    C7-T1:  No spinal canal or neural foraminal stenosis.     No abnormality of the paraspinous soft tissues.      Impression    Impression:   1. Acute nondisplaced fracture of an osteophyte arising from the  anterior superior C7 vertebral body.  2. Multilevel degenerative changes, most significantly resulting in  moderate bilateral neural foraminal stenosis and mild spinal canal  stenosis at C5-6.    [Result: C7 osteophyte fracture]    Finding was identified on 5/26/2022 9:50 PM.     Dr. Butcher was contacted by Dr. Ruiz at 5/26/2022 9:58 PM and  verbalized understanding of the finding.     I have personally reviewed the examination and initial interpretation  and I agree with the findings.    RYNE PETIT MD         SYSTEM ID:  [id number]   XR Cervical Spine Flex/Ext 2/3 Views    Narrative    EXAM: XR CERVICAL SPINE FLEX/EXT 2/3 VW  LOCATION: [facility]  DATE/TIME: 5/26/2022 11:35 PM    INDICATION: Flexion extension XR to assess cervical spine stability WITHOUT cervical collar per Neurosurgery  COMPARISON: None.  TECHNIQUE: CR Cervical Spine.      Impression    IMPRESSION: Redemonstration of acute fracture of anterior superior osteophyte of C7. Flexion  extension views demonstrate 2 mm anterior subluxation C3 on C4 in flexion which reduces in extension. Underlying moderate degenerative changes. Normal   prevertebral soft tissues.         Time Spent on this Encounter   Rhonda BURT APRN CNP, personally saw the patient today and spent greater than 30 minutes discharging this patient.    We appreciate the opportunity to care for your patient while in the hospital.  Should you have any questions about their injuries or this discharge summary our contact information is below.    Trauma Services  [facility]   Department of Critical Care and Acute Care Surgery  [address] [place] [id number]  Office: [phone]

## 2022-05-31 NOTE — PROGRESS NOTES
Care Management Discharge Note    Discharge Date: 05/31/2022     Discharge Disposition: Return to memory care nursing home  [facility]   [address] [place] [address]  p: [phone] f: [phone]  -Orders sent     Discharge Services: Memory care    Discharge DME: None    Discharge Transportation:  [employer] stretcher ride at 1300    Private pay costs discussed: transportation costs    PAS Confirmation Code: n/a  Patient/family educated on Medicare website which has current facility and service quality ratings: n/a    Education Provided on the Discharge Plan: Yes  Persons Notified of Discharge Plans: Patient, pt's daughter Mary Damon @ [facility], bedside RN, attending provider  Patient/Family in Agreement with the Plan: Yes     Handoff Referral Completed: Yes    Additional Information:  Patient will discharge back to her memory care nursing home today at 1300 via [facility] stretcher ride. All persons involved are aware and agreeable to plan. Pt's daughter Marisol requested writer follow up with [facility] Transportation line ([phone]) to determine if stretcher transportation is covered.  call line, was informed they do not provide stretcher transport and representative did not know if they transport COVID positive patients. Relayed this to Marisol, who is agreeable to [facility] stretcher ride and is aware we complete medical necessity form for all stretcher transport and this form gets sent to pt's insurance provider.     GREGORIA Zepeda, [facility]  [facility]   [facility]  Pager: [phone]

## 2022-06-02 NOTE — PROGRESS NOTES
[facility] GERIATRICS    PRIMARY CARE PROVIDER AND CLINIC:  Tu Temple, APRN CNP, [address] [facility] [address]  Chief Complaint   Patient presents with     Hospital F/U      [place] Medical Record Number:  [id number]  Place of Service where encounter took place:  [facility]) [[id number]]    Debi Morton  is a 87 year old  (11/10/1934), returned to the above facility from  [facility]. Hospital stay 5/26/22 - 5/31/22.      HPI:    She has lived at this facility since 9/2020, initially in AL then moved to Memory Care 8/2020.   History of cognitive decline and MRI brain 1/27/2020 showed moderate volume loss with progressive atrophy and progressive chronic small vessel disease compared to 2012. She was seen by Dr Mirza at the [facility] 9/11/2020 who felt she likely has Alzheimer's type dementia. Neuropsychiatric testing was done 9/23/2020 and revealed deficits in attention, learning, memory and language. She had  MMSE 19/30 and CPT 4.5/5.6. Aricept  was started at follow up appt with Dr Mirza 10/7/2020.   She was hospitalized at [facility]  6/25/2021 after a fall resulting in a left intertrochanteric femur  fracture, s/p IM nailing.  6/26/2021 by Dr Mckenzie. Post op course was complicated by encephalopathy and urinary retention. She discharged to tcu and moved to Memory Care upon her return 8/17/2021.     She was sent to the [facility] ED 5/26/2022 after 2 falls with head strike and low back pain.  She had tested positive for COVID-19 on 5/21/2022. CT head showed a right posterior scalp hematoma, no acute intracranial process. CT cervical spine showed an acute nondisplaced C7 osteophyte fracture, as well as multilevel degenerative changes. Neurosurgery consulted recommended nonoperative management. Cervical collar was attempted, but dc'd as it contributed to her agitation and increased the  "likelihood of falls. UC grew 10,000-50,000  E coli and she was treated with ceftriaxone followed by Bactrim for 5 days.     She is a poor historian.  Pleasant and cooperative. She's aware that she was in the hospital, \" I have a bump but it doesn't hurt.\" Denies neck or back pain. Denies feeling ill. Staff reports she has been anxious, but not aggressive. Has been more cooperative with bathing and changing her clothes. Appetite is good. Ambulating with her walker and assistance.     CODE STATUS/ADVANCE DIRECTIVES DISCUSSION:  Prior  DNR / DNI  ALLERGIES:   Allergies   Allergen Reactions     Penicillins Anaphylaxis     Tolerated Ancef 6/2021     Penicillins Unknown      PAST MEDICAL HISTORY:   Past Medical History:   Diagnosis Date     Age-related osteoporosis without current pathological fracture      Dementia (H)      Gastroesophageal reflux disease      Hypercalcemia      Hyperlipidemia      Hypertension      Late onset Alzheimer's disease without behavioral disturbance (H)      Lipidemia      Osteoporosis      Tremor      Venous insufficiency      Vitamin D deficiency      Elvin-Parkinson-White (WPW) pattern       PAST SURGICAL HISTORY:   has a past surgical history that includes Hysterectomy and Open reduction internal fixation rodding intramedullary femur (Left, 6/26/2021).  FAMILY HISTORY: family history is not on file.  SOCIAL HISTORY:   reports that she has quit smoking. She has never used smokeless tobacco. She reports previous alcohol use. She reports that she does not use drugs.  Patient's living condition: lives in an assisted living facility    Post Discharge Medication Reconciliation Status: discharge medications reconciled, continue medications without change  Current Outpatient Medications   Medication Sig     acetaminophen (TYLENOL) 325 MG tablet Take 3 tablets (975 mg) by mouth 3 times daily     alendronate (FOSAMAX) 70 MG tablet TAKE ONE TABLET BY MOUTH EVERY 7 DAYS     ASPIRIN LOW DOSE 81 MG " chewable tablet TAKE 1 TABLET BY MOUTH EVERY MORNING     atorvastatin (LIPITOR) 10 MG tablet TAKE 1 TABLET BY MOUTH ONCE DAILY     CALCIUM ANTACID 500 MG chewable tablet TAKE 1 TABLET BY MOUTH ONCE DAILY     citalopram (CELEXA) 20 MG tablet TAKE ONE AND ONE-HALF TABLETS (30 MG) BY MOUTH ONCE DAILY     cyanocobalamin (VITAMIN B-12) 100 MCG tablet TAKE 1 TABLET BY MOUTH ONCE DAILY     divalproex sodium delayed-release (DEPAKOTE SPRINKLE) 125 MG DR capsule Take 125 mg by mouth 2 times daily (dx:  Psychosis NOS)     donepezil (ARICEPT) 5 MG tablet TAKE 1 TABLET BY MOUTH ONCE DAILY     hypromellose-dextran (HYPROMELLOSE-DEXTRAN 0.3-0.1%) 0.1-0.3 % ophthalmic solution Place 2 drops into both eyes 2 times daily     Multiple Vitamins-Minerals (PRESERVISION AREDS) TABS TAKE 1 TABLET BY MOUTH ONCE DAILY     polyethylene glycol (MIRALAX) 17 GM/Dose powder MIX 17GM OF POWDER IN 8OZ OF WATER UNTIL COMPLETELY DISSOLVED. DRINK SOLUTION BY MOUTH ONCE DAILY     QUEtiapine (SEROQUEL) 100 MG tablet TAKE ONE-HALF TABLET (50MG) BY MOUTH THREE TIMES DAILY AS NEEDED     QUEtiapine (SEROQUEL) 50 MG tablet Take 50mg TID(9a, 4p, 8p) and 50mg TID PRN.     tobramycin-dexamethasone (TOBRADEX) 0.3-0.1 % ophthalmic suspension Place 1 drop into both eyes 4 times daily for 7 days     vitamin D2 (ERGOCALCIFEROL) 90759 units (1250 mcg) capsule TAKE ONE CAPSULE BY MOUTH ONCE A WEEK     No current facility-administered medications for this visit.       ROS:  Limited secondary to cognitive impairment. Positives as noted above     Vitals:  BP (!) 147/76   Pulse 89   Temp 97.5  F (36.4  C)   Resp 16   Wt 59.4 kg (131 lb)   BMI 21.14 kg/m    Exam:  GENERAL APPEARANCE:  Alert, in no distress  ENT:  [place], oropharynx clear  EYES:  EOM normal, conjunctiva and lids normal  NECK:  no adenopathy, no thyromegaly  RESP:  lungs clear to auscultation , no respiratory distress  CV:  regular rate and rhythm, no murmur, no edema  ABDOMEN:  soft, non-tender, no  distension, no masses  M/S:   resting in bed. YANCEY with good strength. No joint inflammation  SKIN:  small bump right posterior scalp, nontender. No rashes or open areas  PSYCH:  oriented to self, insight and judgement impaired, memory impaired , affect and mood normal    Lab/Diagnostic data:  Recent labs in [facility] reviewed by me today.     ASSESSMENT / PLAN:  (S12.601D) Closed nondisplaced fracture of seventh cervical vertebra with routine healing, unspecified fracture morphology, subsequent encounter  (primary encounter diagnosis)  (S09.90XD) Closed head injury, subsequent encounter  (W19.XXXD) Fall, subsequent encounter  Comment: neuro status is at baseline.   Plan: schedule tylenol for pain. Refer for home therapies.   Discussed with her daughter Marisol Morton, who agrees with plan of care.     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 Aggressive behavior much improved with current med regimen. Staff better able to safety provide cares.   Plan: continue depakote, seroquel, donepezil, citalopram. Memory Care staff assistance with cares, meals, activities, med admin.     (M80.00XD) Age-related osteoporosis with current pathological fracture with routine healing, subsequent encounter  (E55.9) Vitamin D deficiency  Comment: remains ambulatory. History of falls, hip fracture 6/2021.   Plan: continue fosamax, calcium, vitamin D    (I10) Essential hypertension  Comment: HCTZ and quinapril have been tapered and dc'd. BPs controlled with recent reading 147/76  Plan: monitor VS. Avoid hypotension due to advanced age and fall risk.     (N39.0) Urinary tract infection without hematuria, site unspecified  Comment: asymptomatic. Completed a course of Bactrim   Plan:  monitor symptoms     (K59.01) Slow transit constipation  Comment: managed   Plan: continue bowel regimen     (R53.81) Physical deconditioning  Comment: due to progressive dementia, acute illness and hospitalization   Plan: refer to [facility] for PHYSICAL THERAPY/OT.           Electronically signed by:  CESAR Best CNP

## 2022-06-08 NOTE — TELEPHONE ENCOUNTER
Debi Morton   11/10/1934      Orders 2022:  1. Tobradex 1 drop to both eyes qid X 7 days for conjunctivitis   Script sent to pharmacy         CESAR Best CNP on 2022 at 10:09 AM

## 2022-06-09 NOTE — PROGRESS NOTES
[facility] GERIATRICS  ACUTE/EPISODIC VISIT    [facility] Medical Record Number:  [id number]  Place of Service where encounter took place:  [facility]) [[id number]]    Chief Complaint   Patient presents with     RECHECK       HPI:    Debi Morton is a 87 year old  (11/10/1934), who is being seen today for an episodic care visit.  HPI information obtained from: facility chart records, facility staff, patient report, [facility] chart review and family/first contact daughter report.  She has lived at this facility since 9/2020, initially in AL then moved to Memory Care 8/2020.   History of cognitive decline and MRI brain 1/27/2020 showed moderate volume loss with progressive atrophy and progressive chronic small vessel disease compared to 2012. She was seen by Dr Mirza at the [facility] 9/11/2020 who felt she likely has Alzheimer's type dementia. Neuropsychiatric testing was done 9/23/2020 and revealed deficits in attention, learning, memory and language. She had  MMSE 19/30 and CPT 4.5/5.6. Aricept  was started at follow up appt with Dr Mirza 10/7/2020.   She was hospitalized at [facility]  6/25/2021 after a fall resulting in a left intertrochanteric femur  fracture, s/p IM nailing.  6/26/2021 by Dr Mckenzie. Post op course was complicated by encephalopathy and urinary retention. She discharged to tcu and moved to Memory Care upon her return 8/17/2021.      She was sent to the [facility] ED 5/26/2022 after 2 falls with head strike and low back pain.  She had tested positive for COVID-19 on 5/21/2022. CT head showed a right posterior scalp hematoma, no acute intracranial process. CT cervical spine showed an acute nondisplaced C7 osteophyte fracture, as well as multilevel degenerative changes. Neurosurgery consulted and recommended nonoperative management. Cervical collar was attempted, but dc'd as it contributed to her agitation and increased the  "likelihood of falls. UC grew 10,000-50,000  E coli and she was treated with ceftriaxone followed by Bactrim for 5 days.     Today's concern is:     Dry eyes  Closed nondisplaced fracture of seventh cervical vertebra with routine healing, unspecified fracture morphology, subsequent encounter  Closed head injury, subsequent encounter  Fall, subsequent encounter  Late onset Alzheimer's dementia with behavioral disturbance (H)  Dementia with psychosis (H)  Infection due to 2019 novel coronavirus  Essential hypertension  Age-related osteoporosis with current pathological fracture with routine healing, subsequent encounter  Vitamin D deficiency   She is a poor historian. Pleasant and talkative. Tobradex was started yesterday for conjunctivitis of both eyes. Nurse reports eyes are better today.She frequently rubs her eyes.  Patient reports \"itching\" of her right eye. Denies change in vision. Denies pain. Ambulates with walker and supervision. Requires assist of 1 with cares. Staff reports she continues to have episodes of agitation, but improved. No physical aggression and accepting staff assistance with bathing and changing clothes.     ALLERGIES:    Allergies   Allergen Reactions     Penicillins Anaphylaxis     Tolerated Ancef 6/2021     Penicillins Unknown        MEDICATIONS:  Post Discharge Medication Reconciliation Status: discharge medications reconciled, continue medications without change.    Current Outpatient Medications   Medication Sig Dispense Refill     divalproex sodium delayed-release (DEPAKOTE) 125 MG DR tablet TAKE 1 TABLET BY MOUTH TWICE DAILY 60 tablet 11     hypromellose-dextran (HYPROMELLOSE-DEXTRAN 0.3-0.1%) 0.1-0.3 % ophthalmic solution Place 2 drops into both eyes 2 times daily 30 mL 11     QUEtiapine (SEROQUEL) 50 MG tablet TAKE 1 TABLET BY MOUTH THREE TIMES DAILY (9AM, 4PM, & 8PM) 90 tablet 11     acetaminophen (TYLENOL) 325 MG tablet Take 3 tablets (975 mg) by mouth 3 times daily 90 tablet 11 "     alendronate (FOSAMAX) 70 MG tablet TAKE ONE TABLET BY MOUTH EVERY 7 DAYS 4 tablet 97     ASPIRIN LOW DOSE 81 MG chewable tablet TAKE 1 TABLET BY MOUTH EVERY MORNING 36 tablet PRN     atorvastatin (LIPITOR) 10 MG tablet TAKE 1 TABLET BY MOUTH ONCE DAILY 28 tablet PRN     CALCIUM ANTACID 500 MG chewable tablet TAKE 1 TABLET BY MOUTH ONCE DAILY 30 tablet 11     citalopram (CELEXA) 20 MG tablet TAKE ONE AND ONE-HALF TABLETS (30 MG) BY MOUTH ONCE DAILY 135 tablet 11     cyanocobalamin (VITAMIN B-12) 100 MCG tablet TAKE 1 TABLET BY MOUTH ONCE DAILY 31 tablet 11     divalproex sodium delayed-release (DEPAKOTE SPRINKLE) 125 MG DR capsule Take 125 mg by mouth 2 times daily (dx:  Psychosis NOS)       donepezil (ARICEPT) 5 MG tablet TAKE 1 TABLET BY MOUTH ONCE DAILY 90 tablet 3     Multiple Vitamins-Minerals (PRESERVISION AREDS) TABS TAKE 1 TABLET BY MOUTH ONCE DAILY 28 tablet PRN     polyethylene glycol (MIRALAX) 17 GM/Dose powder MIX 17GM OF POWDER IN 8OZ OF WATER UNTIL COMPLETELY DISSOLVED. DRINK SOLUTION BY MOUTH ONCE DAILY 510 g 97     QUEtiapine (SEROQUEL) 100 MG tablet TAKE ONE-HALF TABLET (50MG) BY MOUTH THREE TIMES DAILY AS NEEDED  15 tablet 97     vitamin D2 (ERGOCALCIFEROL) 05516 units (1250 mcg) capsule TAKE ONE CAPSULE BY MOUTH ONCE A WEEK 4 capsule PRN       REVIEW OF SYSTEMS:  Unable to obtain due to dementia. Positives as noted under HPI      PHYSICAL EXAM:  /73   Pulse 94   Temp 98.1  F (36.7  C)   Resp 20   Wt 59.4 kg (131 lb)   BMI 21.14 kg/m    GENERAL APPEARANCE:  Alert, in no distress  ENT:  [place], oropharynx clear  EYES:  sclera and lower lids of both eyes with mild erythema, small amount yellow discharge from right eye. No drainage from left eye   NECK:  no adenopathy, no thyromegaly  RESP:  lungs clear to auscultation , no respiratory distress  CV:  regular rate and rhythm, no murmur, no edema  ABDOMEN:  soft, non-tender, no distension, no masses  M/S:  in bed. YANCEY with good strength. No  joint inflammation  SKIN: no rashes or open areas. Slight bump right posterior scalp, nontender   PSYCH:  oriented to self, insight and judgement impaired, memory impaired , affect and mood normal    ASSESSMENT / PLAN:  (H04.123) Dry eyes  (primary encounter diagnosis)  Conjunctivitis of both eyes   Comment: acute infection, improving. She's had frequent episodes of conjunctivitis, frequent rubbing of her eyes and hygiene issues probably contributing. Question if rubbing is related to dry eyes.   Plan: complete course of tobradex. Start artificial eyes bid. Staff assistance with hygiene.   Discussed with her daughter Marisol Morton, who agrees with plan of care.     (S12.601D) Closed nondisplaced fracture of seventh cervical vertebra with routine healing, unspecified fracture morphology, subsequent encounter  (S09.90XD) Closed head injury, subsequent encounter  (W19.XXXD) Fall, subsequent encounter  Comment: neuro status is at baseline. Scalp hematoma resolving   Plan: continue tylenol. Continue home therapies.     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 Doing well with current medication regimen. No s/s of sedation   Plan: continue seroquel, depakote, donepezil, citalopram. Memory Care staff assistance with cares, meals, activities and med admin. Follow up with Neurology as scheduled.     (U07.1) Infection due to 2019 novel coronavirus  Comment: mild symptoms, resolved   Plan: monitor     (I10) Essential hypertension  Comment: HCTZ and quinapril tapered and dc'd. Recent BPs: 116/73, 147/76  HR: 89-94  Plan: monitor VS    (M80.00XD) Age-related osteoporosis with current pathological fracture with routine healing, subsequent encounter  (E55.9) Vitamin D deficiency  Comment: frequent falls. History of hip fracture 6/2021, C7 fracture 5/2022  Plan: continue fosamax, calcium,  vitamin D. Walker and supervision with mobility         Electronically signed by  CESAR Best CNP

## 2022-06-09 NOTE — LETTER
6/9/2022        RE: Debi Morton  [address] [place] [id number]        [facility] GERIATRICS  ACUTE/EPISODIC VISIT    [facility] Medical Record Number:  [id number]  Place of Service where encounter took place:  THE [facility]) [[id number]]    Chief Complaint   Patient presents with     RECHECK       HPI:    Debi Morton is a 87 year old  (11/10/1934), who is being seen today for an episodic care visit.  HPI information obtained from: facility chart records, facility staff, patient report, [facility] chart review and family/first contact daughter report.  She has lived at this facility since 9/2020, initially in AL then moved to Memory Care 8/2020.   History of cognitive decline and MRI brain 1/27/2020 showed moderate volume loss with progressive atrophy and progressive chronic small vessel disease compared to 2012. She was seen by Dr Mirza at the [facility] 9/11/2020 who felt she likely has Alzheimer's type dementia. Neuropsychiatric testing was done 9/23/2020 and revealed deficits in attention, learning, memory and language. She had  MMSE 19/30 and CPT 4.5/5.6. Aricept  was started at follow up appt with Dr Mirza 10/7/2020.   She was hospitalized at [facility]  6/25/2021 after a fall resulting in a left intertrochanteric femur  fracture, s/p IM nailing.  6/26/2021 by Dr Mckenzie. Post op course was complicated by encephalopathy and urinary retention. She discharged to tcu and moved to Memory Care upon her return 8/17/2021.      She was sent to the [facility] ED 5/26/2022 after 2 falls with head strike and low back pain.  She had tested positive for COVID-19 on 5/21/2022. CT head showed a right posterior scalp hematoma, no acute intracranial process. CT cervical spine showed an acute nondisplaced C7 osteophyte fracture, as well as multilevel degenerative changes. Neurosurgery consulted and  "recommended nonoperative management. Cervical collar was attempted, but dc'd as it contributed to her agitation and increased the likelihood of falls. UC grew 10,000-50,000  E coli and she was treated with ceftriaxone followed by Bactrim for 5 days.     Today's concern is:     Dry eyes  Closed nondisplaced fracture of seventh cervical vertebra with routine healing, unspecified fracture morphology, subsequent encounter  Closed head injury, subsequent encounter  Fall, subsequent encounter  Late onset Alzheimer's dementia with behavioral disturbance (H)  Dementia with psychosis (H)  Infection due to 2019 novel coronavirus  Essential hypertension  Age-related osteoporosis with current pathological fracture with routine healing, subsequent encounter  Vitamin D deficiency   She is a poor historian. Pleasant and talkative. Tobradex was started yesterday for conjunctivitis of both eyes. Nurse reports eyes are better today.She frequently rubs her eyes.  Patient reports \"itching\" of her right eye. Denies change in vision. Denies pain. Ambulates with walker and supervision. Requires assist of 1 with cares. Staff reports she continues to have episodes of agitation, but improved. No physical aggression and accepting staff assistance with bathing and changing clothes.     ALLERGIES:    Allergies   Allergen Reactions     Penicillins Anaphylaxis     Tolerated Ancef 6/2021     Penicillins Unknown        MEDICATIONS:  Post Discharge Medication Reconciliation Status: discharge medications reconciled, continue medications without change.    Current Outpatient Medications   Medication Sig Dispense Refill     divalproex sodium delayed-release (DEPAKOTE) 125 MG DR tablet TAKE 1 TABLET BY MOUTH TWICE DAILY 60 tablet 11     hypromellose-dextran (HYPROMELLOSE-DEXTRAN 0.3-0.1%) 0.1-0.3 % ophthalmic solution Place 2 drops into both eyes 2 times daily 30 mL 11     QUEtiapine (SEROQUEL) 50 MG tablet TAKE 1 TABLET BY MOUTH THREE TIMES DAILY (9AM, " 4PM, & 8PM) 90 tablet 11     acetaminophen (TYLENOL) 325 MG tablet Take 3 tablets (975 mg) by mouth 3 times daily 90 tablet 11     alendronate (FOSAMAX) 70 MG tablet TAKE ONE TABLET BY MOUTH EVERY 7 DAYS 4 tablet 97     ASPIRIN LOW DOSE 81 MG chewable tablet TAKE 1 TABLET BY MOUTH EVERY MORNING 36 tablet PRN     atorvastatin (LIPITOR) 10 MG tablet TAKE 1 TABLET BY MOUTH ONCE DAILY 28 tablet PRN     CALCIUM ANTACID 500 MG chewable tablet TAKE 1 TABLET BY MOUTH ONCE DAILY 30 tablet 11     citalopram (CELEXA) 20 MG tablet TAKE ONE AND ONE-HALF TABLETS (30 MG) BY MOUTH ONCE DAILY 135 tablet 11     cyanocobalamin (VITAMIN B-12) 100 MCG tablet TAKE 1 TABLET BY MOUTH ONCE DAILY 31 tablet 11     divalproex sodium delayed-release (DEPAKOTE SPRINKLE) 125 MG DR capsule Take 125 mg by mouth 2 times daily (dx:  Psychosis NOS)       donepezil (ARICEPT) 5 MG tablet TAKE 1 TABLET BY MOUTH ONCE DAILY 90 tablet 3     Multiple Vitamins-Minerals (PRESERVISION AREDS) TABS TAKE 1 TABLET BY MOUTH ONCE DAILY 28 tablet PRN     polyethylene glycol (MIRALAX) 17 GM/Dose powder MIX 17GM OF POWDER IN 8OZ OF WATER UNTIL COMPLETELY DISSOLVED. DRINK SOLUTION BY MOUTH ONCE DAILY 510 g 97     QUEtiapine (SEROQUEL) 100 MG tablet TAKE ONE-HALF TABLET (50MG) BY MOUTH THREE TIMES DAILY AS NEEDED  15 tablet 97     vitamin D2 (ERGOCALCIFEROL) 58011 units (1250 mcg) capsule TAKE ONE CAPSULE BY MOUTH ONCE A WEEK 4 capsule PRN       REVIEW OF SYSTEMS:  Unable to obtain due to dementia. Positives as noted under HPI      PHYSICAL EXAM:  /73   Pulse 94   Temp 98.1  F (36.7  C)   Resp 20   Wt 59.4 kg (131 lb)   BMI 21.14 kg/m    GENERAL APPEARANCE:  Alert, in no distress  ENT:  [place], oropharynx clear  EYES:  sclera and lower lids of both eyes with mild erythema, small amount yellow discharge from right eye. No drainage from left eye   NECK:  no adenopathy, no thyromegaly  RESP:  lungs clear to auscultation , no respiratory distress  CV:  regular rate  and rhythm, no murmur, no edema  ABDOMEN:  soft, non-tender, no distension, no masses  M/S:  in bed. YANCEY with good strength. No joint inflammation  SKIN: no rashes or open areas. Slight bump right posterior scalp, nontender   PSYCH:  oriented to self, insight and judgement impaired, memory impaired , affect and mood normal    ASSESSMENT / PLAN:  (H04.123) Dry eyes  (primary encounter diagnosis)  Conjunctivitis of both eyes   Comment: acute infection, improving. She's had frequent episodes of conjunctivitis, frequent rubbing of her eyes and hygiene issues probably contributing. Question if rubbing is related to dry eyes.   Plan: complete course of tobradex. Start artificial eyes bid. Staff assistance with hygiene.   Discussed with her daughter Marisol Morton, who agrees with plan of care.     (S12.601D) Closed nondisplaced fracture of seventh cervical vertebra with routine healing, unspecified fracture morphology, subsequent encounter  (S09.90XD) Closed head injury, subsequent encounter  (W19.XXXD) Fall, subsequent encounter  Comment: neuro status is at baseline. Scalp hematoma resolving   Plan: continue tylenol. Continue home therapies.     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 Doing well with current medication regimen. No s/s of sedation   Plan: continue seroquel, depakote, donepezil, citalopram. Memory Care staff assistance with cares, meals, activities and med admin. Follow up with Neurology as scheduled.     (U07.1) Infection due to 2019 novel coronavirus  Comment: mild symptoms, resolved   Plan: monitor     (I10) Essential hypertension  Comment: HCTZ and quinapril tapered and dc'd. Recent BPs: 116/73, 147/76  HR: 89-94  Plan: monitor VS    (M80.00XD) Age-related osteoporosis with current pathological fracture with routine healing, subsequent encounter  (E55.9)  Vitamin D deficiency  Comment: frequent falls. History of hip fracture 6/2021, C7 fracture 5/2022  Plan: continue fosamax, calcium, vitamin D. Walker and supervision with mobility         Electronically signed by  CESAR Best CNP               Sincerely,        CESAR Best CNP

## 2022-06-21 NOTE — ED PROVIDER NOTES
ED Provider Note  [facility]      History     Chief Complaint   Patient presents with     Fall     Hip Pain     HPI  Debi Morton is a 87 year old female who presents to us with a chief complaint of fall earlier today.  She has a history of dementia and was at her care home when she had a fall landing on her right hip and hitting her head.  EMS was called but the patient got up and walked away.  She was complaining of further worsening pain so EMS was called again and she was transferred here.  No loss of consciousness.  Her nursing facility reported she was at her baseline mental status.    Past Medical History  Past Medical History:   Diagnosis Date     Age-related osteoporosis without current pathological fracture      Dementia (H)      Gastroesophageal reflux disease      Hypercalcemia      Hyperlipidemia      Hypertension      Late onset Alzheimer's disease without behavioral disturbance (H)      Lipidemia      Osteoporosis      Tremor      Venous insufficiency      Vitamin D deficiency      Elvin-Parkinson-White (WPW) pattern      Past Surgical History:   Procedure Laterality Date     HYSTERECTOMY       OPEN REDUCTION INTERNAL FIXATION RODDING INTRAMEDULLARY FEMUR Left 6/26/2021    Procedure: LEFT OPEN REDUCTION INTERNAL FIXATION, FRACTURE, FEMUR, USING INTRAMEDULLARY MICHELINE;  Surgeon: Elizabeth Mckenzie MD;  Location: [facility] OR     divalproex sodium delayed-release (DEPAKOTE) 125 MG DR tablet  QUEtiapine (SEROQUEL) 50 MG tablet  acetaminophen (TYLENOL) 325 MG tablet  alendronate (FOSAMAX) 70 MG tablet  ASPIRIN LOW DOSE 81 MG chewable tablet  atorvastatin (LIPITOR) 10 MG tablet  CALCIUM ANTACID 500 MG chewable tablet  citalopram (CELEXA) 20 MG tablet  cyanocobalamin (VITAMIN B-12) 100 MCG tablet  divalproex sodium delayed-release (DEPAKOTE SPRINKLE) 125 MG DR capsule  donepezil (ARICEPT) 5 MG tablet  hypromellose-dextran (HYPROMELLOSE-DEXTRAN 0.3-0.1%) 0.1-0.3 % ophthalmic  solution  Multiple Vitamins-Minerals (PRESERVISION AREDS) TABS  polyethylene glycol (MIRALAX) 17 GM/Dose powder  QUEtiapine (SEROQUEL) 100 MG tablet  vitamin D2 (ERGOCALCIFEROL) 31394 units (1250 mcg) capsule      Allergies   Allergen Reactions     Penicillins Anaphylaxis     Tolerated Ancef 6/2021     Penicillins Unknown     Family History  No family history on file.  Social History   Social History     Tobacco Use     Smoking status: Former Smoker     Smokeless tobacco: Never Used   Substance Use Topics     Alcohol use: Not Currently     Drug use: Never      Past medical history, past surgical history, medications, allergies, family history, and social history were reviewed with the patient. No additional pertinent items.       Review of Systems   Unable to perform ROS: Dementia   All other systems reviewed and are negative.    A complete review of systems was performed with pertinent positives and negatives noted in the HPI, and all other systems negative.    Physical Exam   BP: (!) 160/91  Pulse: 70  Temp: 97.9  F (36.6  C)  Resp: 16  SpO2: 98 %  Physical Exam  Constitutional:       General: She is not in acute distress.     Appearance: She is not diaphoretic.   HENT:      Head: Normocephalic and atraumatic.      Right Ear: External ear normal.      Left Ear: External ear normal.      Nose: Nose normal.   Eyes:      Extraocular Movements: Extraocular movements intact.      Conjunctiva/sclera: Conjunctivae normal.   Cardiovascular:      Rate and Rhythm: Normal rate and regular rhythm.      Heart sounds: Normal heart sounds.   Pulmonary:      Effort: Pulmonary effort is normal. No respiratory distress.      Breath sounds: Normal breath sounds.   Abdominal:      General: There is no distension.      Palpations: Abdomen is soft.      Tenderness: There is no abdominal tenderness.   Musculoskeletal:         General: No swelling or deformity.      Cervical back: Normal range of motion and neck supple.      Comments:  Mild right hip tenderness, able to move the right leg   Skin:     General: Skin is warm and dry.   Neurological:      Comments: Alert, mild agitation, not answering questions, slightly combative.         ED Course      Procedures                 Results for orders placed or performed during the hospital encounter of 06/21/22   XR Pelvis w Hip Right 1 View     Status: None    Narrative    EXAM: XR PELVIS AND HIP RIGHT 1 VIEW  LOCATION: [facility]  DATE/TIME: 6/21/2022 1:22 AM    INDICATION: fall, AMS, R hip pain  COMPARISON: 07/03/2021      Impression    IMPRESSION: Generalized osteopenia. Prior left hip ORIF. Moderate degenerative changes at the symphysis pubis. Mild to moderate bilateral hip degenerative change. Right hip is normally located without plain film evidence of fracture. There is a large   subcutaneous hematoma overlying the proximal femoral shaft measuring up to 5.9 cm. Moderately extensive vascular calcification.   Odem Draw     Status: None    Narrative    The following orders were created for panel order Odem Draw.  Procedure                               Abnormality         Status                     ---------                               -----------         ------                     Extra Blue Top Tube[[id number]]                              Final result               Extra Red Top Tube[[id number]]                               Final result               Extra Green Top (Lithium...[[id number]]                      Final result               Extra Purple Top Tube[[id number]]                            Final result                 Please view results for these tests on the individual orders.   Extra Blue Top Tube     Status: None   Result Value Ref Range    Hold Specimen [facility]    Extra Red Top Tube     Status: None   Result Value Ref Range    Hold Specimen [facility]    Extra Green Top (Lithium Heparin) Tube     Status: None   Result Value Ref Range    Hold  Specimen [facility]    Extra Purple Top Tube     Status: None   Result Value Ref Range    Hold Specimen [facility]    Comprehensive metabolic panel     Status: Abnormal   Result Value Ref Range    Sodium 138 133 - 144 mmol/L    Potassium 3.7 3.4 - 5.3 mmol/L    Chloride 104 94 - 109 mmol/L    Carbon Dioxide (CO2) 29 20 - 32 mmol/L    Anion Gap 5 3 - 14 mmol/L    Urea Nitrogen 14 7 - 30 mg/dL    Creatinine 0.62 0.52 - 1.04 mg/dL    Calcium 8.5 8.5 - 10.1 mg/dL    Glucose 87 70 - 99 mg/dL    Alkaline Phosphatase 127 40 - 150 U/L    AST 15 0 - 45 U/L    ALT 14 0 - 50 U/L    Protein Total 6.0 (L) 6.8 - 8.8 g/dL    Albumin 2.8 (L) 3.4 - 5.0 g/dL    Bilirubin Total 0.3 0.2 - 1.3 mg/dL    GFR Estimate 86 >60 mL/min/1.73m2   CBC with platelets and differential     Status: Abnormal   Result Value Ref Range    WBC Count 11.7 (H) 4.0 - 11.0 10e3/uL    RBC Count 3.56 (L) 3.80 - 5.20 10e6/uL    Hemoglobin 10.5 (L) 11.7 - 15.7 g/dL    Hematocrit 33.2 (L) 35.0 - 47.0 %    MCV 93 78 - 100 fL    MCH 29.5 26.5 - 33.0 pg    MCHC 31.6 31.5 - 36.5 g/dL    RDW 16.1 (H) 10.0 - 15.0 %    Platelet Count 892 (H) 150 - 450 10e3/uL    % Neutrophils 76 %    % Lymphocytes 9 %    % Monocytes 9 %    % Eosinophils 4 %    % Basophils 1 %    % Immature Granulocytes 1 %    NRBCs per 100 WBC 0 <1 /100    Absolute Neutrophils 8.9 (H) 1.6 - 8.3 10e3/uL    Absolute Lymphocytes 1.1 0.8 - 5.3 10e3/uL    Absolute Monocytes 1.0 0.0 - 1.3 10e3/uL    Absolute Eosinophils 0.5 0.0 - 0.7 10e3/uL    Absolute Basophils 0.1 0.0 - 0.2 10e3/uL    Absolute Immature Granulocytes 0.1 <=0.4 10e3/uL    Absolute NRBCs 0.0 10e3/uL   CBC with platelets differential     Status: Abnormal    Narrative    The following orders were created for panel order CBC with platelets differential.  Procedure                               Abnormality         Status                     ---------                               -----------         ------                     CBC with platelets and  d...[[id number]]  Abnormal            Final result                 Please view results for these tests on the individual orders.     Medications - No data to display     Assessments & Plan (with Medical Decision Making)   87-year-old female presents to us with a chief complaint of fall.  Previous records were reviewed.  Vital signs today show hypertension but were otherwise unremarkable.  Labs were interpreted and was unremarkable.  X-ray was interpreted and shows no acute fracture.  There is evidence of a hematoma.  Discussed with the patient's daughter emergency contact.  Given the patient's somewhat uncooperative nature and dementia I discussed whether or not should we find any abnormal CT head findings with they want anything done.  The patient is not holding still particularly effectively for us and I am concerned about her ability to stay on the CT table as this would put her at risk for further injury.  Patient's daughter states that even if there were an abnormality on the CT head is highly unlikely they would proceed with surgical intervention.  Based on discussions with the patient's daughter the patient seems to be at her baseline mental status.  We will discharge her back to her care facility.  I have reviewed the nursing notes. I have reviewed the findings, diagnosis, plan and need for follow up with the patient.    New Prescriptions    No medications on file       Final diagnoses:   Hip pain, right       --  Dejon Man  [facility] EMERGENCY DEPARTMENT  6/21/2022     Dejon Man DO  06/21/22 [id number]

## 2022-06-21 NOTE — ED TRIAGE NOTES
Pt BIBA from nursing home where EMS was originally called for a controlled fall where the patient fell on her right hip and hit her head. When EMS arrived, the patient was standing and was power walking back to the room so EMS left. EMS was called back for increased hip pain. Patient is not on blood thinners and did not lose consciousness. VSS upon arrival. BG 93. EMS did note the patient was combative when placing her on the stretcher. Patient cooperative and pleasant on arrival.

## 2022-06-23 NOTE — LETTER
6/23/2022        RE: Debi Morton  [facility]  [address]  [address]  [facility] [id number] [place] [address] GERIATRICS    Chief Complaint   Patient presents with     RECHECK     HPI:  Debi Morton is a 87 year old  (11/10/1934), who is being seen today for an episodic care visit at: [facility] ([facility]) [[id number]].   She has lived at this facility since 9/2020, initially in AL then moved to Memory Care 8/2020.   History of cognitive decline and MRI brain 1/27/2020 showed moderate volume loss with progressive atrophy and progressive chronic small vessel disease compared to 2012. She was seen by Dr Mirza at the [facility] 9/11/2020 who felt she likely has Alzheimer's type dementia. Neuropsychiatric testing was done 9/23/2020 and revealed deficits in attention, learning, memory and language. She had  MMSE 19/30 and CPT 4.5/5.6. Aricept  was started at follow up appt with Dr Mirza 10/7/2020.   She was hospitalized at [facility]  6/25/2021 after a fall resulting in a left intertrochanteric femur  fracture, s/p IM nailing.  6/26/2021 by Dr Mckenzie. Post op course was complicated by encephalopathy and urinary retention. She discharged to tcu and moved to Memory Care upon her return 8/17/2021.   She was sent to the [facility] ED 5/26/2022 after 2 falls with head strike and low back pain.  She had tested positive for COVID-19 on 5/21/2022. CT head showed a right posterior scalp hematoma, no acute intracranial process. CT cervical spine showed an acute nondisplaced C7 osteophyte fracture, as well as multilevel degenerative changes. Neurosurgery consulted and recommended nonoperative management. Cervical collar was attempted, but dc'd as it contributed to her agitation and increased the likelihood of falls. UC grew 10,000-50,000  E coli and she was treated with ceftriaxone followed by Bactrim for 5 days.       Today's concern is:      Late  onset Alzheimer's dementia with behavioral disturbance (H)  Dementia with psychosis (H)  Closed nondisplaced fracture of seventh cervical vertebra with routine healing, unspecified fracture morphology, subsequent encounter  Closed head injury, subsequent encounter  Hematoma of right hip, subsequent encounter  Fall, subsequent encounter  Essential hypertension  Conjunctivitis of both eyes, unspecified conjunctivitis type  Physical deconditioning  She was sent to the  ED 6/21/2022 after a fall with head strike and right hip pain. XR pelvis and right hip showed mild to moderate degenerative changes, generalized osteopenia and a large hematoma overlying the proximal femoral shaft measuring up to 5.9 cm.   She is resting in bed,then gets up on her own and ambulates about her room with her walker. Poor historian. Denies pain, but rubs her right hip when sitting up. Pleasant and cooperative.  Staff reports she remains agitated and resistive at times, but no physical aggression. She has not been fully cooperative with home therapies.     Allergies, and PMH/PSH reviewed in EPIC today    REVIEW OF SYSTEMS:  Unable to obtain due to dementia. Positives as noted under HPI      Objective:   /80   Pulse 72   Temp 96.9  F (36.1  C)   Resp 18   Wt 65.1 kg (143 lb 8 oz)   BMI 23.16 kg/m    GENERAL APPEARANCE:  Alert, in no distress  ENT:  [place], oropharynx clear  EYES: conjunctiva and lids normal, no drainage  NECK:  no adenopathy, no thyromegaly  RESP:  lungs clear to auscultation , no respiratory distress  CV:  regular rate and rhythm, no murmur,  no edema  ABDOMEN:  soft, non-tender, no distension, no masses  M/S:   gait steady with walker. YANCEY with good strength. No joint inflammation   SKIN:  small bump right posterior scalp, nontender. Hematoma over right hip with surrounding ecchymosis, no warmth   PSYCH:  oriented to self, insight and judgement impaired, memory impaired , affect and mood normal    Recent labs in [facility]  reviewed by me today.     ASSESSMENT / PLAN:  (F03.91) Dementia with psychosis (H)  (primary encounter diagnosis)  (G30.1,  F02.81) Late onset Alzheimer's dementia with behavioral disturbance (H)  Comment: severe deficits with physical aggression and difficult behaviors. Doing well with current medication regimen-but meds may be contributing to her falls.   Plan: decrease afternoon dose of seroquel to 25 mg, continue 50 mg bid. Continue depakote, donepezil, citalopram. Continue to taper seroquel if possible. Memory Care staff assistance with meals, activities, cares and med admin.   Discussed with her daughter Marisol Morton and a separate conversation with her son Perfecto Morton.     (S12.601D) Closed nondisplaced fracture of seventh cervical vertebra with routine healing, unspecified fracture morphology, subsequent encounter  Comment: appears comfortable without pain   Plan: continue tylenol     (S09.90XD) Closed head injury, subsequent encounter  Comment: neuro status is at baseline   Plan: as above. Monitor neuros     Hematoma of right hip   (W19.XXXD) Fall, subsequent encounter  Comment:frequent falls likely multifactorial with progressive dementia, poor safety awareness, gait instability and meds all contributing. No signs of significant pain.   Plan: continue tylenol. Staff assistance with cares and mobility. Decrease afternoon dose of seroquel-continue to taper as able.     (I10) Essential hypertension  Comment: controlled with recent BPs: 122/80, 148/74  HR: 72  HCTZ and quinapril have been tapered and discontinued    Plan: monitor VS    Conjunctivitis of both eyes  Comment: resolved with a course of tobradex  Plan: continue artificial tears and monitor     (R53.81) Physical deconditioning  Comment: not fully cooperative with therapies   Plan: continue PHYSICAL THERAPY/OT         Electronically signed by: CESAR Best CNP             Sincerely,        CESAR Best CNP

## 2022-06-27 PROBLEM — R53.1 WEAKNESS: Status: ACTIVE | Noted: 2022-01-01

## 2022-06-27 NOTE — ED PROVIDER NOTES
[facility] EMERGENC[facility] DEPARTMENT ([facility])  6/27/22    History     Chief Complaint   Patient presents with     Nausea, Vomiting, & Diarrhea     The history is provided by the patient and medical records. The history is limited by the condition of the patient.     Debi Morton is a 87 year old female with a history of Alzheimer's dementia, GERD, HTN, and HLD who presents to the Emergency Department from assisted living with nausea, vomiting, diarrhea. Patient endorses feeling sick. She denies dysuria, blood in her stool, or diarrhea. Patient reports she has a bump on her head. She states her fall was a year ago. She denies headache. Patient notes she is currently on antibiotics. Patient reported to have 2 unwitnessed falls yesterday. Physical therapy was going to see her at her assisted living facility and they found vomit and diarrhea in her room and called EMS.    Per chart review, patient has multiple recent falls. She was here in the ED on 5/26 after 2 falls with head strike and low back pain. CT head showed right posterior scalp hematoma. CT cervical spine with acute nondisplaced C7 osteophyte fracture. Patient also had urine culture with 10,000-50,000 E coli and she was treated with ceftriaxone followed by 5 day course of Bactrim. She presented to the Ed again on 6/21 after a fall with head strike and right hip pain. No evidence of fracture on x-ray, however, there was a large subcutaneous hematoma overlying the proximal femoral shaft.    Past Medical History  Past Medical History:   Diagnosis Date     Age-related osteoporosis without current pathological fracture      Dementia (H)      Gastroesophageal reflux disease      Hypercalcemia      Hyperlipidemia      Hypertension      Late onset Alzheimer's disease without behavioral disturbance (H)      Lipidemia      Osteoporosis      Tremor      Venous insufficiency      Vitamin D deficiency      Elvin-Parkinson-White (WPW) pattern      Past  "Surgical History:   Procedure Laterality Date     HYSTERECTOMY       OPEN REDUCTION INTERNAL FIXATION RODDING INTRAMEDULLARY FEMUR Left 6/26/2021    Procedure: LEFT OPEN REDUCTION INTERNAL FIXATION, FRACTURE, FEMUR, USING INTRAMEDULLARY MICHELINE;  Surgeon: Elizabeth Mckenzie MD;  Location: [facility] OR     No current outpatient medications on file.    Allergies   Allergen Reactions     Penicillins Anaphylaxis     Tolerated Ancef 6/2021     Penicillins Unknown     Family History  No family history on file.  Social History   Social History     Tobacco Use     Smoking status: Former Smoker     Smokeless tobacco: Never Used   Substance Use Topics     Alcohol use: Not Currently     Drug use: Never      Past medical history, past surgical history, medications, allergies, family history, and social history were reviewed with the patient. No additional pertinent items.       Review of Systems   Unable to perform ROS: Dementia   Gastrointestinal: Positive for nausea and vomiting.     A complete review of systems was performed with pertinent positives and negatives noted in the HPI, and all other systems negative.    Physical Exam   BP: (!) 145/65  Pulse: 84  Temp: (!) 96.6  F (35.9  C)  Resp: 18  Height: 167.6 cm (5' 5.98\")  Weight: 51.6 kg (113 lb 12.1 oz)  SpO2: 92 %  Physical Exam  Vitals and nursing note reviewed.   Constitutional:       General: She is not in acute distress.     Appearance: She is well-developed. She is ill-appearing. She is not diaphoretic.   HENT:      Head: Normocephalic and atraumatic.      Nose: Nose normal.      Mouth/Throat:      Mouth: Mucous membranes are moist.   Eyes:      General: No scleral icterus.     Conjunctiva/sclera: Conjunctivae normal.   Cardiovascular:      Rate and Rhythm: Normal rate.   Pulmonary:      Effort: Pulmonary effort is normal. No respiratory distress.      Breath sounds: No stridor.   Abdominal:      General: There is no distension.      Palpations: Abdomen is soft.      " Tenderness: There is no abdominal tenderness. There is no guarding or rebound.   Musculoskeletal:         General: No deformity or signs of injury. Normal range of motion.      Cervical back: Normal range of motion and neck supple. No rigidity or tenderness.   Skin:     General: Skin is warm and dry.      Coloration: Skin is not jaundiced or pale.      Findings: Bruising (extremities) present.   Neurological:      General: No focal deficit present.      Mental Status: She is alert. She is confused.      Cranial Nerves: No dysarthria or facial asymmetry.      Motor: No tremor, abnormal muscle tone or seizure activity.   Psychiatric:         Behavior: Behavior normal.         ED Course   4:30 PM  The patient was seen and examined by Jud Santos MD in [facility].      Procedures            EKG Interpretation:      Interpreted by Jud Santos MD  Time reviewed: 1738  Symptoms at time of EKG: n/v   Rhythm: sinus tachycardia  Rate: normal  Axis: normal  Ectopy: none  Conduction: normal  ST Segments/ T Waves: No ST-T wave changes  Q Waves: none  Comparison to prior: Unchanged    Clinical Impression: normal EKG                    Results for orders placed or performed during the hospital encounter of 06/27/22   CT Head w/o Contrast     Status: None    Narrative    CT HEAD W/O CONTRAST 6/27/2022 6:12 PM    History: fall, n/v     Comparison: Head CT 5/26/2022    Technique: Using multidetector thin collimation helical acquisition  technique, axial, coronal and sagittal CT images from the skull base  to the vertex were obtained without intravenous contrast.   (topogram) image(s) also obtained and reviewed.    Findings: There is no intracranial hemorrhage, mass effect, or midline  shift. Gray/white matter differentiation in both cerebral hemispheres  is preserved. Ventricles are proportionate to the cerebral sulci. The  basal cisterns are clear. Low-attenuation in the periventricular and  subcortical white matter, likely  chronic small vessel ischemic  disease. Moderate diffuse parenchymal volume loss.    Posterior parietal scalp hematoma. The bony calvaria and the bones of  the skull base are normal. Minimal paranasal sinus mucosal thickening.      Impression    Impression:  1. No acute intracranial pathology.   2. Posterior parietal scalp hematoma. No underlying calvarial  fracture.    I have personally reviewed the examination and initial interpretation  and I agree with the findings.    TOMMY ADORNO MD         SYSTEM ID:  [id number]   CT Cervical Spine w/o Contrast     Status: None    Narrative    CT CERVICAL SPINE W/O CONTRAST 6/27/2022 6:12 PM    Provided History: fall    Comparison: CT cervical spine 5/26/2022    Technique: Using multidetector thin collimation helical acquisition  technique, axial, coronal and sagittal CT images through the cervical  spine were obtained without intravenous contrast.     Findings:  Grade 1 retrolisthesis of C5 on C6.. Straightening of the normal  cervical lordosis. Subacute nondisplaced fracture of the C7 anterior  superior endplate osteophyte, similar to findings on CT 5/26/2022. No  acute fracture or subluxation. No prevertebral edema. There is  advanced multilevel disc height narrowing most significant at C5-C6.  The findings on a level by level basis are as follows:    C2-3:  No significant spinal canal or neural foraminal stenosis.    C3-4:  Right greater than left facet arthropathy and uncinate  spurring. Moderate right and mild left neural foraminal stenosis. No  significant spinal canal stenosis.    C4-5:  Right greater than left facet arthropathy and uncinate  spurring. Moderate right and mild left neural foraminal stenosis. Mild  spinal canal narrowing.    C5-6:  Circumferential disc osteophyte complex with bilateral facet  arthropathy and uncinate spurring. Moderate-severe bilateral neural  foraminal stenosis. Mild to moderate spinal canal stenosis.    C6-7:  Bilateral facet  arthropathy and uncinate spurring. Moderate  right and mild left neural foraminal stenosis. Mild spinal canal  narrowing.    C7-T1:  No significant spinal canal or neural foraminal stenosis.     No abnormality of the paraspinous soft tissues.      Impression    Impression:   1. No acute fracture or traumatic subluxation of the cervical spine.  Subacute nondisplaced fracture of the C7 anterior superior osteophyte,  similar to CT 5/26/2022.  2. Multilevel cervical spondylosis most pronounced at C5-C6.    I have personally reviewed the examination and initial interpretation  and I agree with the findings.    TOMMY ADORNO MD         SYSTEM ID:  [id number]   XR Chest 2 Views     Status: None    Narrative    EXAM: XR CHEST 2 VW  6/27/2022 8:24 PM     HISTORY:  falls, vomiting, eval aspiration       COMPARISON:  None    TECHNIQUE: AP and lateral views of the chest    FINDINGS:     The trachea is midline. The cardiomediastinal silhouette is enlarged.  The pulmonary vasculature is indistinct. Perihilar and bibasilar mixed  interstitial and streaky airspace opacities. Retrocardiac opacity. No  appreciable pleural effusion or pneumothorax. No acute osseous  abnormality.  Degenerative changes of the thoracic spine.      Impression    IMPRESSION:   1. Perihilar and bibasilar mixed interstitial and streaky airspace  opacities, possibilities include infection/aspiration, atelectasis  and/or edema.  2. Cardiomegaly.    I have personally reviewed the examination and initial interpretation  and I agree with the findings.    ALDEN KENT DO         SYSTEM ID:  [id number]   XR Chest Port 1 View     Status: None    Narrative    XR CHEST PORT 1 VIEW  6/28/2022 1:55 AM      HISTORY: increase oxygen needs    COMPARISON: 6/27/2022    FINDINGS:   Single AP view of the chest. Stable mediastinum. No pneumothorax.  Small left and probable trace right pleural effusions. Increased left  greater than right bibasilar interstitial and airspace  opacities.  Aortic atherosclerosis. Osseous structures are unchanged.      Impression    IMPRESSION:   Increased left greater than right bibasilar pulmonary opacities with  associated small left and probable trace right pleural effusions.    I have personally reviewed the examination and initial interpretation  and I agree with the findings.    TOMMY VIVAS MD         SYSTEM ID:  [id number]   Comprehensive metabolic panel     Status: Abnormal   Result Value Ref Range    Sodium 134 (L) 136 - 145 mmol/L    Potassium 4.0 3.4 - 5.3 mmol/L    Creatinine 0.57 0.51 - 0.95 mg/dL    Urea Nitrogen 21.2 8.0 - 23.0 mg/dL    Chloride 97 (L) 98 - 107 mmol/L    Carbon Dioxide (CO2) 29 22 - 29 mmol/L    Anion Gap 8 7 - 15 mmol/L    Glucose 121 (H) 70 - 99 mg/dL    Calcium 9.3 8.8 - 10.2 mg/dL    Protein Total 6.2 (L) 6.4 - 8.3 g/dL    Albumin 3.6 3.5 - 5.2 g/dL    Bilirubin Total 0.6 <=1.2 mg/dL    Alkaline Phosphatase 134 (H) 35 - 104 U/L    AST 19 10 - 35 U/L    ALT 6 (L) 10 - 35 U/L    GFR Estimate 87 >60 mL/min/1.73m2   Lipase     Status: Normal   Result Value Ref Range    Lipase 15 13 - 60 U/L   UA with Microscopic reflex to Culture     Status: Abnormal    Specimen: Urine, Catheter   Result Value Ref Range    Color Urine Yellow Colorless, Straw, Light Yellow, Yellow    Appearance Urine Clear Clear    Glucose Urine Negative Negative mg/dL    Bilirubin Urine Negative Negative    Ketones Urine 10  (A) Negative mg/dL    Specific Gravity Urine 1.029 1.003 - 1.035    Blood Urine Negative Negative    pH Urine 5.5 5.0 - 7.0    Protein Albumin Urine 30  (A) Negative mg/dL    Urobilinogen Urine Normal Normal, 2.0 mg/dL    Nitrite Urine Negative Negative    Leukocyte Esterase Urine Negative Negative    Mucus Urine Present (A) None Seen /LPF    RBC Urine 1 <=2 /HPF    WBC Urine 1 <=5 /HPF    Hyaline Casts Urine 3 (H) <=2 /LPF    Narrative    Urine Culture not indicated   CBC with platelets and differential     Status: Abnormal   Result  Value Ref Range    WBC Count 12.5 (H) 4.0 - 11.0 10e3/uL    RBC Count 3.12 (L) 3.80 - 5.20 10e6/uL    Hemoglobin 9.5 (L) 11.7 - 15.7 g/dL    Hematocrit 29.0 (L) 35.0 - 47.0 %    MCV 93 78 - 100 fL    MCH 30.4 26.5 - 33.0 pg    MCHC 32.8 31.5 - 36.5 g/dL    RDW 17.2 (H) 10.0 - 15.0 %    Platelet Count 852 (H) 150 - 450 10e3/uL    % Neutrophils 90 %    % Lymphocytes 2 %    % Monocytes 7 %    % Eosinophils 0 %    % Basophils 0 %    % Immature Granulocytes 1 %    NRBCs per 100 WBC 0 <1 /100    Absolute Neutrophils 11.2 (H) 1.6 - 8.3 10e3/uL    Absolute Lymphocytes 0.3 (L) 0.8 - 5.3 10e3/uL    Absolute Monocytes 0.8 0.0 - 1.3 10e3/uL    Absolute Eosinophils 0.0 0.0 - 0.7 10e3/uL    Absolute Basophils 0.0 0.0 - 0.2 10e3/uL    Absolute Immature Granulocytes 0.1 <=0.4 10e3/uL    Absolute NRBCs 0.0 10e3/uL   Troponin T, High Sensitivity     Status: Abnormal   Result Value Ref Range    Troponin T, High Sensitivity 22 (H) <=14 ng/L   Troponin T, High Sensitivity     Status: Abnormal   Result Value Ref Range    Troponin T, High Sensitivity 23 (H) <=14 ng/L   Procalcitonin     Status: Normal   Result Value Ref Range    Procalcitonin 0.13 <5.00 ng/mL   CBC with platelets     Status: Abnormal   Result Value Ref Range    WBC Count 20.3 (H) 4.0 - 11.0 10e3/uL    RBC Count 3.20 (L) 3.80 - 5.20 10e6/uL    Hemoglobin 9.7 (L) 11.7 - 15.7 g/dL    Hematocrit 30.8 (L) 35.0 - 47.0 %    MCV 96 78 - 100 fL    MCH 30.3 26.5 - 33.0 pg    MCHC 31.5 31.5 - 36.5 g/dL    RDW 16.8 (H) 10.0 - 15.0 %    Platelet Count 883 (H) 150 - 450 10e3/uL   Comprehensive metabolic panel     Status: Abnormal   Result Value Ref Range    Sodium 138 136 - 145 mmol/L    Potassium 4.1 3.4 - 5.3 mmol/L    Creatinine 0.57 0.51 - 0.95 mg/dL    Urea Nitrogen 19.3 8.0 - 23.0 mg/dL    Chloride 99 98 - 107 mmol/L    Carbon Dioxide (CO2) 29 22 - 29 mmol/L    Anion Gap 10 7 - 15 mmol/L    Glucose 85 70 - 99 mg/dL    Calcium 8.7 (L) 8.8 - 10.2 mg/dL    Protein Total 6.2 (L) 6.4  - 8.3 g/dL    Albumin 3.6 3.5 - 5.2 g/dL    Bilirubin Total 0.5 <=1.2 mg/dL    Alkaline Phosphatase 129 (H) 35 - 104 U/L    AST 21 10 - 35 U/L    ALT 8 (L) 10 - 35 U/L    GFR Estimate 87 >60 mL/min/1.73m2   Troponin T, High Sensitivity     Status: Abnormal   Result Value Ref Range    Troponin T, High Sensitivity 42 (H) <=14 ng/L   Blood gas venous     Status: Abnormal   Result Value Ref Range    pH Venous 7.07 (LL) 7.32 - 7.43    pCO2 Venous 111 (HH) 40 - 50 mm Hg    pO2 Venous 100 (H) 25 - 47 mm Hg    Bicarbonate Venous 32 (H) 21 - 28 mmol/L    Base Excess/Deficit (+/-) -0.1 -7.7 - 1.9 mmol/L    FIO2 100    Glucose by meter     Status: Abnormal   Result Value Ref Range    GLUCOSE BY METER POCT 191 (H) 70 - 99 mg/dL   Blood gas venous     Status: Abnormal   Result Value Ref Range    pH Venous 7.31 (L) 7.32 - 7.43    pCO2 Venous 67 (H) 40 - 50 mm Hg    pO2 Venous 18 (L) 25 - 47 mm Hg    Bicarbonate Venous 34 (H) 21 - 28 mmol/L    Base Excess/Deficit (+/-) 5.8 (H) -7.7 - 1.9 mmol/L    FIO2 60    Glucose by meter     Status: Normal   Result Value Ref Range    GLUCOSE BY METER POCT 94 70 - 99 mg/dL   Glucose by meter     Status: Normal   Result Value Ref Range    GLUCOSE BY METER POCT 78 70 - 99 mg/dL   EKG 12-lead, tracing only     Status: None   Result Value Ref Range    Systolic Blood Pressure  mmHg    Diastolic Blood Pressure  mmHg    Ventricular Rate 108 BPM    Atrial Rate 108 BPM    NC Interval 170 ms    QRS Duration 84 ms     ms    QTc 460 ms    P Axis 23 degrees    R AXIS -15 degrees    T Axis 35 degrees    Interpretation ECG       Sinus tachycardia  Possible Left atrial enlargement  Borderline ECG  Unconfirmed report - interpretation of this ECG is computer generated - see medical record for final interpretation  Confirmed by - EMERGENCY ROOM, PHYSICIAN ([id number]),  MIKKI CONNOLLY ([id number]) on 6/27/2022 8:53:48 PM     EKG 12 lead     Status: None   Result Value Ref Range    Systolic Blood Pressure  mmHg     Diastolic Blood Pressure  mmHg    Ventricular Rate 92 BPM    Atrial Rate 92 BPM    NE Interval 158 ms    QRS Duration 98 ms     ms    QTc 462 ms    P Axis 39 degrees    R AXIS -16 degrees    T Axis 28 degrees    Interpretation ECG       Sinus rhythm  Possible Left atrial enlargement  Borderline ECG  Unconfirmed report - interpretation of this ECG is computer generated - see medical record for final interpretation  Confirmed by - EMERGENCY ROOM, PHYSICIAN ([id number]),  MIKKI CONNOLLY ([id number]) on 6/27/2022 8:53:58 PM     Enteric Bacteria and Virus Panel by MARIANGEL Stool     Status: Normal    Specimen: Per Rectum; Stool   Result Value Ref Range    Campylobacter group Not Detected Not Detected    Salmonella species Not Detected Not Detected    Shigella species Not Detected Not Detected    Vibrio group Not Detected Not Detected    Rotavirus Not Detected Not Detected    Shiga toxin 1 gene Not Detected Not Detected    Shiga toxin 2 gene Not Detected Not Detected    Norovirus I and II Not Detected Not Detected    Yersinia enterocolitica Not Detected Not Detected    Narrative    Testing performed by multiplexed, qualitative PCR using the [employer] Enteric Pathogens Nucleic Acid Test. Results should not be used as the sole basis for diagnosis, treatment or other patient management decisions. Positive results do not rule out co-infection with other organisms that are not detected by this test and may not be the sole or definitive cause of patient illness. Negative results in the setting of clinical illness compatible with gastroenteritis may be due to infection by pathogens that are not detected by this test or non-infectious causes such as ulcerative colitis, irritable bowel syndrome or Crohn's disease. Note: Shiga toxin producing E. coli (STEC) typically harbor one or both genes that encode for Shiga toxins 1 and 2.   Blood Culture Arm, Right     Status: Normal (Preliminary result)    Specimen: Arm, Right; Blood    Result Value Ref Range    Culture No growth after 12 hours    Blood Culture Arm, Left     Status: Normal (Preliminary result)    Specimen: Arm, Left; Blood   Result Value Ref Range    Culture No growth after 12 hours    CBC with platelets differential     Status: Abnormal    Narrative    The following orders were created for panel order CBC with platelets differential.  Procedure                               Abnormality         Status                     ---------                               -----------         ------                     CBC with platelets and d...[[id number]]  Abnormal            Final result                 Please view results for these tests on the individual orders.     Medications   acetaminophen (TYLENOL) tablet 1,000 mg (1,000 mg Oral Not Given 6/27/22 2059)   acetaminophen (TYLENOL) tablet 650 mg (has no administration in time range)   carboxymethylcellulose PF (REFRESH PLUS) 0.5 % ophthalmic solution 2 drop (2 drops Both Eyes Given 6/28/22 1043)   citalopram (celeXA) tablet 30 mg (30 mg Oral Not Given 6/28/22 0924)   divalproex sodium delayed-release (DEPAKOTE) DR tablet 125 mg (125 mg Oral Not Given 6/28/22 0924)   donepezil (ARICEPT) tablet 5 mg (5 mg Oral Not Given 6/28/22 0924)   polyethylene glycol (MIRALAX) Packet 17 g (17 g Oral Not Given 6/28/22 0924)   atorvastatin (LIPITOR) tablet 10 mg (10 mg Oral Not Given 6/28/22 0923)   melatonin tablet 1 mg (has no administration in time range)   ondansetron (ZOFRAN ODT) ODT tab 4 mg (has no administration in time range)     Or   ondansetron (ZOFRAN) injection 4 mg (has no administration in time range)   lidocaine 1 % 0.1-1 mL (has no administration in time range)   lidocaine (LMX4) cream (has no administration in time range)   sodium chloride (PF) 0.9% PF flush 3 mL (has no administration in time range)   sodium chloride (PF) 0.9% PF flush 3 mL (3 mLs Intracatheter Not Given 6/28/22 0910)   ipratropium - albuterol 0.5 mg/2.5 mg/3 mL  (DUONEB) neb solution 3 mL (3 mLs Nebulization Given 6/28/22 0120)   No lozenges or gum should be given while patient on BIPAP/AVAPS/AVAPS AE (has no administration in time range)   carboxymethylcellulose PF (REFRESH PLUS) 0.5 % ophthalmic solution 1 drop (has no administration in time range)   HOLD: All Oral Medications (has no administration in time range)   QUEtiapine (SEROquel) half-tab 37.5 mg ( Oral Automatically Held 7/1/22 2000)   levofloxacin (LEVAQUIN) infusion 750 mg (has no administration in time range)   haloperidol lactate (HALDOL) injection 2 mg (has no administration in time range)     Or   haloperidol (HALDOL) 2 MG/ML (HIGH CONC) solution 2 mg (has no administration in time range)   dextrose 5% in lactated ringers infusion ( Intravenous Rate/Dose Verify 6/28/22 1300)   levofloxacin (LEVAQUIN) infusion 750 mg (750 mg Intravenous New Bag 6/27/22 2251)   OLANZapine zydis (zyPREXA) ODT tab 5 mg (5 mg Oral Given 6/27/22 2241)        Assessments & Plan (with Medical Decision Making)   Debi Morton is a 87 year old female with a history of Alzheimer's dementia, GERD, HTN, and HLD who presents to the Emergency Department from assisted living with nausea, vomiting, diarrhea.    Ddx: c diff colitis, gastroenteritis, ICH/TBI, UTI, unwitnessed fall, anginal equivalent, Pneumonia, aspiration, other infectious colitis    Patient mildly hypertensive, afebrile, satting 92% on room air.  We will get an x-ray to check for aspiration since patient has had significant vomiting.  She has dried emesis on her shirt and face.  Given Zofran and IV fluids.  No active vomiting during my assessment.    Collateral info obtained by RN: patient had 2 recent unwitnessed falls. She had her dose os Seroquel increased recently and has been more sleepy. Will obtain head and C spine CT.     Also testing for C. difficile colitis.  Patient is currently in the hallway and has not had a bowel movement in her depends.  Patient will need  to be brought into the room to be straight cathed for UA since she is incontinent of urine.     Labs notable for white count of 12.5, hemoglobin 9.5 which is stable.  LFTs and creatinine normal.  Blood glucose normal.  Lipase normal.  Troponin in process.    CT C-spine without acute traumatic abnormalities.  There is a posterior parietal scalp hematoma but no underlying fracture or hemorrhage on head CT.  CT of the C-spine shows previous C7 anterior superior osteophyte fracture.  This was adressed during patient's prior admission for this and she has not been complaining of pain.    Patient signed out to Dr. Calle at shift change with disposition pending urinalysis and remainder patient's labs and x-ray.    I have reviewed the nursing notes. I have reviewed the findings, diagnosis, plan and need for follow up with the patient.    Current Discharge Medication List          Final diagnoses:   Nausea and vomiting, intractability of vomiting not specified, unspecified vomiting type   Recurrent falls   Weakness   Acute respiratory failure with hypoxia (H)     I, Harriet Cano, am serving as a trained medical scribe to document services personally performed by Jud Santos MD, based on the provider's statements to me.      IJud MD, was physically present and have reviewed and verified the accuracy of this note documented by Harriet Cano.    --  Jud Santos MD  [facility] EMERGENCY DEPARTMENT  6/27/2022     Jud Santos MD  06/28/22 2734

## 2022-06-27 NOTE — CARE PLAN
Attempted to call nursing facility to gather a baseline mental status. Unable to reach x2. Also attempted to call daughter x2 without an answer.

## 2022-06-27 NOTE — ED TRIAGE NOTES
Lives at [facility] care assisted living  H/o dementia and aggression  N/v/d  Fatigue    Daughter [facility] [phone]

## 2022-06-27 NOTE — ED NOTES
Bed: [facility]  Expected date:   Expected time:   Means of arrival:   Comments:  [id number]  87 f  N/v/d  yellow

## 2022-06-28 PROBLEM — J96.01 ACUTE HYPOXEMIC RESPIRATORY FAILURE (H): Status: ACTIVE | Noted: 2022-01-01

## 2022-06-28 NOTE — ED NOTES
Emergency Department Patient Sign-out       Brief HPI:  This is a 87 year old female signed out to me by Dr. Santos .  See initial ED Provider note for details of the presentation.            Significant Events prior to my assuming care: The pt is a 87 yof with a PMH of dementia who presents from her memory care facility with weakness and a fall. She has had some nausea and diarrhea recently as well. UA and stool studies pending. Initial troponin slightly elevated at 22.       Exam:   Patient Vitals for the past 24 hrs:   BP Temp Temp src Pulse Resp SpO2   06/27/22 1512 (!) 145/65 (!) 96.6  F (35.9  C) Axillary 84 18 92 %           ED RESULTS:   Results for orders placed or performed during the hospital encounter of 06/27/22 (from the past 24 hour(s))   Comprehensive metabolic panel     Status: Abnormal    Collection Time: 06/27/22  4:10 PM   Result Value Ref Range    Sodium 134 (L) 136 - 145 mmol/L    Potassium 4.0 3.4 - 5.3 mmol/L    Creatinine 0.57 0.51 - 0.95 mg/dL    Urea Nitrogen 21.2 8.0 - 23.0 mg/dL    Chloride 97 (L) 98 - 107 mmol/L    Carbon Dioxide (CO2) 29 22 - 29 mmol/L    Anion Gap 8 7 - 15 mmol/L    Glucose 121 (H) 70 - 99 mg/dL    Calcium 9.3 8.8 - 10.2 mg/dL    Protein Total 6.2 (L) 6.4 - 8.3 g/dL    Albumin 3.6 3.5 - 5.2 g/dL    Bilirubin Total 0.6 <=1.2 mg/dL    Alkaline Phosphatase 134 (H) 35 - 104 U/L    AST 19 10 - 35 U/L    ALT 6 (L) 10 - 35 U/L    GFR Estimate 87 >60 mL/min/1.73m2   CBC with platelets differential     Status: Abnormal    Collection Time: 06/27/22  4:10 PM    Narrative    The following orders were created for panel order CBC with platelets differential.  Procedure                               Abnormality         Status                     ---------                               -----------         ------                     CBC with platelets and d...[[id number]]  Abnormal            Final result                 Please view results for these tests on the individual  orders.   Lipase     Status: Normal    Collection Time: 06/27/22  4:10 PM   Result Value Ref Range    Lipase 15 13 - 60 U/L   CBC with platelets and differential     Status: Abnormal    Collection Time: 06/27/22  4:10 PM   Result Value Ref Range    WBC Count 12.5 (H) 4.0 - 11.0 10e3/uL    RBC Count 3.12 (L) 3.80 - 5.20 10e6/uL    Hemoglobin 9.5 (L) 11.7 - 15.7 g/dL    Hematocrit 29.0 (L) 35.0 - 47.0 %    MCV 93 78 - 100 fL    MCH 30.4 26.5 - 33.0 pg    MCHC 32.8 31.5 - 36.5 g/dL    RDW 17.2 (H) 10.0 - 15.0 %    Platelet Count 852 (H) 150 - 450 10e3/uL    % Neutrophils 90 %    % Lymphocytes 2 %    % Monocytes 7 %    % Eosinophils 0 %    % Basophils 0 %    % Immature Granulocytes 1 %    NRBCs per 100 WBC 0 <1 /100    Absolute Neutrophils 11.2 (H) 1.6 - 8.3 10e3/uL    Absolute Lymphocytes 0.3 (L) 0.8 - 5.3 10e3/uL    Absolute Monocytes 0.8 0.0 - 1.3 10e3/uL    Absolute Eosinophils 0.0 0.0 - 0.7 10e3/uL    Absolute Basophils 0.0 0.0 - 0.2 10e3/uL    Absolute Immature Granulocytes 0.1 <=0.4 10e3/uL    Absolute NRBCs 0.0 10e3/uL   Troponin T, High Sensitivity     Status: Abnormal    Collection Time: 06/27/22  4:10 PM   Result Value Ref Range    Troponin T, High Sensitivity 22 (H) <=14 ng/L   EKG 12-lead, tracing only     Status: None (Preliminary result)    Collection Time: 06/27/22  5:35 PM   Result Value Ref Range    Systolic Blood Pressure  mmHg    Diastolic Blood Pressure  mmHg    Ventricular Rate 108 BPM    Atrial Rate 108 BPM    MI Interval 170 ms    QRS Duration 84 ms     ms    QTc 460 ms    P Axis 23 degrees    R AXIS -15 degrees    T Axis 35 degrees    Interpretation ECG       Sinus tachycardia  Possible Left atrial enlargement  Borderline ECG     CT Head w/o Contrast     Status: None    Collection Time: 06/27/22  6:12 PM    Narrative    CT HEAD W/O CONTRAST 6/27/2022 6:12 PM    History: fall, n/v     Comparison: Head CT 5/26/2022    Technique: Using multidetector thin collimation helical  acquisition  technique, axial, coronal and sagittal CT images from the skull base  to the vertex were obtained without intravenous contrast.   (topogram) image(s) also obtained and reviewed.    Findings: There is no intracranial hemorrhage, mass effect, or midline  shift. Gray/white matter differentiation in both cerebral hemispheres  is preserved. Ventricles are proportionate to the cerebral sulci. The  basal cisterns are clear. Low-attenuation in the periventricular and  subcortical white matter, likely chronic small vessel ischemic  disease. Moderate diffuse parenchymal volume loss.    Posterior parietal scalp hematoma. The bony calvaria and the bones of  the skull base are normal. Minimal paranasal sinus mucosal thickening.      Impression    Impression:  1. No acute intracranial pathology.   2. Posterior parietal scalp hematoma. No underlying calvarial  fracture.    I have personally reviewed the examination and initial interpretation  and I agree with the findings.    TOMMY ADORNO MD         SYSTEM ID:  [id number]   CT Cervical Spine w/o Contrast     Status: None    Collection Time: 06/27/22  6:12 PM    Narrative    CT CERVICAL SPINE W/O CONTRAST 6/27/2022 6:12 PM    Provided History: fall    Comparison: CT cervical spine 5/26/2022    Technique: Using multidetector thin collimation helical acquisition  technique, axial, coronal and sagittal CT images through the cervical  spine were obtained without intravenous contrast.     Findings:  Grade 1 retrolisthesis of C5 on C6.. Straightening of the normal  cervical lordosis. Subacute nondisplaced fracture of the C7 anterior  superior endplate osteophyte, similar to findings on CT 5/26/2022. No  acute fracture or subluxation. No prevertebral edema. There is  advanced multilevel disc height narrowing most significant at C5-C6.  The findings on a level by level basis are as follows:    C2-3:  No significant spinal canal or neural foraminal stenosis.    C3-4:   Right greater than left facet arthropathy and uncinate  spurring. Moderate right and mild left neural foraminal stenosis. No  significant spinal canal stenosis.    C4-5:  Right greater than left facet arthropathy and uncinate  spurring. Moderate right and mild left neural foraminal stenosis. Mild  spinal canal narrowing.    C5-6:  Circumferential disc osteophyte complex with bilateral facet  arthropathy and uncinate spurring. Moderate-severe bilateral neural  foraminal stenosis. Mild to moderate spinal canal stenosis.    C6-7:  Bilateral facet arthropathy and uncinate spurring. Moderate  right and mild left neural foraminal stenosis. Mild spinal canal  narrowing.    C7-T1:  No significant spinal canal or neural foraminal stenosis.     No abnormality of the paraspinous soft tissues.      Impression    Impression:   1. No acute fracture or traumatic subluxation of the cervical spine.  Subacute nondisplaced fracture of the C7 anterior superior osteophyte,  similar to CT 5/26/2022.  2. Multilevel cervical spondylosis most pronounced at C5-C6.    I have personally reviewed the examination and initial interpretation  and I agree with the findings.    TOMMY ADORNO MD         SYSTEM ID:  [id number]   UA with Microscopic reflex to Culture     Status: Abnormal    Collection Time: 06/27/22  7:25 PM    Specimen: Urine, Catheter   Result Value Ref Range    Color Urine Yellow Colorless, Straw, Light Yellow, Yellow    Appearance Urine Clear Clear    Glucose Urine Negative Negative mg/dL    Bilirubin Urine Negative Negative    Ketones Urine 10  (A) Negative mg/dL    Specific Gravity Urine 1.029 1.003 - 1.035    Blood Urine Negative Negative    pH Urine 5.5 5.0 - 7.0    Protein Albumin Urine 30  (A) Negative mg/dL    Urobilinogen Urine Normal Normal, 2.0 mg/dL    Nitrite Urine Negative Negative    Leukocyte Esterase Urine Negative Negative    Mucus Urine Present (A) None Seen /LPF    RBC Urine 1 <=2 /HPF    WBC Urine 1 <=5 /HPF     Hyaline Casts Urine 3 (H) <=2 /LPF    Narrative    Urine Culture not indicated       ED MEDICATIONS:   Medications   ondansetron (ZOFRAN) injection 4 mg (has no administration in time range)   acetaminophen (TYLENOL) tablet 1,000 mg (has no administration in time range)         Impression:  No diagnosis found.    Plan:    Pending studies include UA, stool studies.    UA not c/w UTI    Stool solid, unable to send for c difficile testing. Sent for O&P    Pt now c/o chest pain, repeat EKG and troponin ordered         EKG Interpretation:      Interpreted by Shital Calle MD  Time reviewed:2039   Symptoms at time of EKG: chest pain   Rhythm: normal sinus   Rate: normal, 92 bpm  Axis: NORMAL  Ectopy: none  Conduction: normal  ST Segments/ T Waves: No ST-T wave changes  Q Waves: none  LAE  Comparison to prior: Unchanged from EKG from earlier today    Clinical Impression: LAE, otherwise normal EKG    Troponin stable at 23    CXR c/w possible pneumonia, abx ordered    Patient discussed with ED observation unit, to be admitted to their service for further management. Plan was discussed with patient who understands and agrees with plan.     MD Alva Beltran Michelle C, MD  06/27/22 1774

## 2022-06-28 NOTE — ED NOTES
Spoke to patient's HCPOA Perfecto (patient's son) - updated him that we would like to admit the patient for observation. Perfecto is agreeable with this plan. Perfecto requested we make his sister (pt's daughter) Marisol Morton the primary contact for updates this week as he is out of town and has limited cell service. Please contact Marisol on her cell at [phone].    Perfecto's cell #: [phone]  Cabin #: [phone]

## 2022-06-28 NOTE — PROGRESS NOTES
Admitted/transferred from: Observation    Reason for admission/transfer: Increased respiratory needs     Patient status upon admission/transfer: stable   Interventions: Continuing on Bipap  Plan:   2 RN skin assessment: completed by Lorelei FLORES and Ingrid RUELAS  Result of skin assessment and interventions/actions: bruising, blanchable red coccyx  Height, weight, drug calc weight: Done  Patient belongings (see Flowsheet - Adult Profile for details):   MDRO education (if applicable):

## 2022-06-28 NOTE — CODE/RAPID RESPONSE
"   06/28/22 0100   Call Information   Date of Call 06/28/22   Time of Call 0110   Name of person requesting the team Iggy KEBEDE   Title of person requesting team RN   RRT Arrival time 0115   Time RRT ended 0225   Reason for call   Type of RRT Adult   Primary reason for call Respiratory;Neurological   Respiratory O2sat less than 88% for greater than 5 minutes despite O2;Respiratory pattern change   Neurological Lethargic;Unresponsive   SBAR   Situation Patient with oxygen sats below 80%   Background Per provider note \"History of Alzheimer's dementia, GERD, HTN, osteoporosis with recent hip fracture and C7 osteophyte fracture, recurrent falls,  and HLD who presents to the Emergency Department from assisted living with nausea.\"   Notable History/Conditions Hypertension;Neurological   Assessment Patient unresponsive to stimuli. Sats in the 70's with guppy breathing upon arrival.   Interventions Blood glucose;CXR;Labs;Meds;Neb treatment;O2 per N/C or mask;Other (describe)  (BiPap)   Patient Outcome   Patient Outcome Transferred to  (4C 312)   RRT Team   Date Attending Physician notified 06/28/22   Physician(s) Sheri May, MAXIME, Bowen Mckeon MD   Lead RN Luiz KEBEDE   RT Lorraine   Post RRT Intervention Assessment   Post RRT Assessment Stable/Improved   Date Follow Up Done 06/28/22   Time Follow Up Done 0400   Comments Improved to 80% FiO2     "

## 2022-06-28 NOTE — PLAN OF CARE
Goal Outcome Evaluation:        Problem: Oral Intake Inadequate  Goal: Improved Oral Intake  Outcome: Ongoing, Not Progressing

## 2022-06-28 NOTE — H&P
[facility]    History and Physical - ED Observation       Date of Admission:  6/27/2022    Assessment & Plan      Debi Morton is a 87 year old female admitted on 6/27/2022. She has a history of Alzheimer's dementia, GERD, HTN, osteoporosis with recent hip fracture and C7 osteophyte fracture, recurrent falls,  and HLD who presents to the Emergency Department from assisted living with nausea.    ##Recurrent falls  ##Weakness  ##Pneumonia, Aspiration vs CAP  ##Nausea & Vomiting  ##Diarrhea  Elderly female with a history of dementia Who presents to the emergency department after being found down in her room at her memory care facility.  Vomit and diarrhea were in her room.  Per chart review, patient was treated for UTI about a month ago, with a 5-day course of Bactrim.  Patient fell 1 week ago and was found to have a large subcutaneous hematoma overlying the proximal femoral shaft.  Patient has a history of agitation and has been on Seroquel now 3 times daily.  Per the patient's daughter, she is more sleepy lately.  In the ED, patient is initially tachycardic, with heart rate 108. Pulse oximetry shows O2 sat 86% on room air, patient has been started on 2 L of oxygen.  Now only requiring 1 L per nasal cannula. Labs show leukocytosis, WBC: 12.5.  Procalcitonin 0.13.  Troponin 22, then repeat 23. Chest x-ray shows perihilar and bibasilar mixed interstitial and streaky airspace opacities, possibilities include infection/aspiration atelectasis or edema. UA is negative for infection. Enteric stool panel is pending. CT cervical spine and CT head showed no acute fracture or traumatic subluxation.  Posterior parietal scalp hematoma. Medications: Levaquin 750 mg IV, Zyprexa ODT 5 mg. Per nursing, patient having difficulty swallowing pills. Unclear etiology, will have speech do eval in AM. Plan: [place] to ED observation for further antibiotics and breathing treatments, pneumonia  management.   - Continuous pulse oximetry  - Oxygen as needed  - DuoNebs  - Levaquin daily  - Follow blood cultures, Enteric panel, C. difficile testing  - CBC, BMP in a.m.  - Speech language pathology consult    ##Dementia  Per chart review, History of cognitive decline and MRI brain 1/27/2020 showed moderate volume loss with progressive atrophy and progressive chronic small vessel disease compared to 2012. She was seen by Dr Mirza at the [facility] 9/11/2020 who felt she likely has Alzheimer's type dementia. Neuropsychiatric testing was done 9/23/2020 and revealed deficits in attention, learning, memory and language. She had MMSE 19/30 and CPT 4.5/5.6. Pt hospitalized at [facility]  6/25/2021 after a fall resulting in a left intertrochanteric femur fracture, s/p IM nailing. Post op course was complicated by encephalopathy and urinary retention. She discharged to TCU and moved to Memory Care upon her return 8/17/2021. Pt had 2 falls 5/26/2022, with head strike and low back pain. CT head showed a right posterior scalp hematoma, no acute intracranial process. CT cervical spine showed an acute nondisplaced C7 osteophyte fracture, as well as multilevel degenerative changes. Neurosurgery recommended nonoperative management. Cervical collar was attempted, but dc'd as it contributed to pt agitation and increased the likelihood of falls. UC grew 10,000-50,000  E coli and she was treated with ceftriaxone followed by Bactrim for 5 days. Tonight, family reportedly was concerned that patient seemed weaker than baseline, so planned for ED Observation stay for further monitoring.   - Continue prior to admission Depakote, Seroquel, aricept, citalopram    ##Hyponatremia:   NA: 134. Consider additional IV fluid in the morning, given patient's current agitation, will hold off at this time.  - BMP in a.m.    ##HLD:   Continue PTA atorvastatin    ##Covid Recovered.  Pt tested positive for Covid,   "5/21/2022       Diet:   regular  DVT Prophylaxis: Low Risk/Ambulatory with no VTE prophylaxis indicated  [place] Catheter: Not present  Central Lines: None  Cardiac Monitoring: ACTIVE order. Indication: elevated troponin in altered patient  Code Status: No CPR- Do NOT Intubate DNR/DNI    Clinically Significant Risk Factors Present on Admission                          Disposition Plan      Expected Discharge Date: 06/29/2022                The patient's care was discussed with the Bedside Nurse, Patient and ED MD, Dr Shital Calle.    Sheri May CNP  Hospitalist [facility]  ED Observation, Ascom:[id number]  ______________________________________________________________________    Chief Complaint   Weakness    Unable to obtain a history from the patient due to dementia    History of Present Illness   Per ED, \"The history is provided by the patient and medical records. The history is limited by the condition of the patient.      Debi Morton is a 87 year old female with a history of Alzheimer's dementia, GERD, HTN, and HLD who presents to the Emergency Department from assisted living with nausea. Patient endorses feeling sick. She denies dysuria, blood in her stool, or diarrhea. Patient reports she has a bump on her head. She states her fall was a year ago. She denies headache. Patient notes she is currently on antibiotics. Patient reported to have 2 unwitnessed falls yesterday. Physical therapy was going to see her at her assisted living facility and they found vomit and diarrhea in her room and called EMS.     Per chart review, patient has multiple recent falls. She was here in the ED on 5/26 after 2 falls with head strike and low back pain. CT head showed right posterior scalp hematoma. CT cervical spine with acute nondisplaced C7 osteophyte fracture. Patient also had urine culture with 10,000-50,000 E coli and she was treated with ceftriaxone followed by 5 " "day course of Bactrim. She presented to the Ed again on 6/21 after a fall with head strike and right hip pain. No evidence of fracture on x-ray, however, there was a large subcutaneous hematoma overlying the proximal femoral shaft.\"    Review of Systems    Unable to perform ROS: Dementia   Gastrointestinal: Positive for nausea and vomiting.      A complete review of systems was performed with pertinent positives and negatives noted in the HPI, and all other systems negative.    Past Medical History    I have reviewed this patient's medical history and updated it with pertinent information if needed.   Past Medical History:   Diagnosis Date     Age-related osteoporosis without current pathological fracture      Dementia (H)      Gastroesophageal reflux disease      Hypercalcemia      Hyperlipidemia      Hypertension      Late onset Alzheimer's disease without behavioral disturbance (H)      Lipidemia      Osteoporosis      Tremor      Venous insufficiency      Vitamin D deficiency      Elvin-Parkinson-White (WPW) pattern        Past Surgical History   I have reviewed this patient's surgical history and updated it with pertinent information if needed.  Past Surgical History:   Procedure Laterality Date     HYSTERECTOMY       OPEN REDUCTION INTERNAL FIXATION RODDING INTRAMEDULLARY FEMUR Left 6/26/2021    Procedure: LEFT OPEN REDUCTION INTERNAL FIXATION, FRACTURE, FEMUR, USING INTRAMEDULLARY MICHELINE;  Surgeon: Elizabeth Mckenzie MD;  Location: [facility] OR       Social History   I have reviewed this patient's social history and updated it with pertinent information if needed.  Social History     Tobacco Use     Smoking status: Former Smoker     Smokeless tobacco: Never Used   Substance Use Topics     Alcohol use: Not Currently     Drug use: Never       Prior to Admission Medications   Prior to Admission Medications   Prescriptions Last Dose Informant Patient Reported? Taking?   ASPIRIN LOW DOSE 81 MG chewable tablet   No No "   Sig: TAKE 1 TABLET BY MOUTH EVERY MORNING   Patient taking differently: HOLD   CALCIUM ANTACID 500 MG chewable tablet   No No   Sig: TAKE 1 TABLET BY MOUTH ONCE DAILY   Multiple Vitamins-Minerals (PRESERVISION AREDS) TABS   No No   Sig: TAKE 1 TABLET BY MOUTH ONCE DAILY   QUEtiapine (SEROQUEL) 50 MG tablet   No No   Sig: Take 1 tablet (50 mg) by mouth 2 times daily Plus 25 mg po at 2 pm   acetaminophen (TYLENOL) 325 MG tablet   No No   Sig: Take 3 tablets (975 mg) by mouth 3 times daily   alendronate (FOSAMAX) 70 MG tablet   No No   Sig: TAKE ONE TABLET BY MOUTH EVERY 7 DAYS   atorvastatin (LIPITOR) 10 MG tablet   No No   Sig: TAKE 1 TABLET BY MOUTH ONCE DAILY   citalopram (CELEXA) 20 MG tablet   No No   Sig: TAKE ONE AND ONE-HALF TABLETS (30 MG) BY MOUTH ONCE DAILY   cyanocobalamin (VITAMIN B-12) 100 MCG tablet   No No   Sig: TAKE 1 TABLET BY MOUTH ONCE DAILY   divalproex sodium delayed-release (DEPAKOTE) 125 MG DR tablet   No No   Sig: TAKE 1 TABLET BY MOUTH TWICE DAILY   donepezil (ARICEPT) 5 MG tablet   No No   Sig: TAKE 1 TABLET BY MOUTH ONCE DAILY   hypromellose-dextran (HYPROMELLOSE-DEXTRAN 0.3-0.1%) 0.1-0.3 % ophthalmic solution   No No   Sig: Place 2 drops into both eyes 2 times daily   polyethylene glycol (MIRALAX) 17 GM/Dose powder   No No   Sig: MIX 17GM OF POWDER IN 8OZ OF WATER UNTIL COMPLETELY DISSOLVED. DRINK SOLUTION BY MOUTH ONCE DAILY   vitamin D2 (ERGOCALCIFEROL) 97530 units (1250 mcg) capsule   No No   Sig: TAKE ONE CAPSULE BY MOUTH ONCE A WEEK      Facility-Administered Medications: None     Allergies   Allergies   Allergen Reactions     Penicillins Anaphylaxis     Tolerated Ancef 6/2021     Penicillins Unknown       Physical Exam   Vital Signs: Temp: 97.8  F (36.6  C) Temp src: Axillary BP: 110/59 Pulse: 83   Resp: 16 SpO2: 96 % O2 Device: BiPAP/CPAP Oxygen Delivery: 1 LPM  Weight: 113 lbs 12.12 oz    Constitutional: awake, alert, cooperative, no apparent distress, and appears stated  age  Eyes: Lids and lashes normal, pupils equal, round and reactive to light, extra ocular muscles intact, sclera clear, conjunctiva normal  ENT: Normocephalic, atraumatic, external ears without lesions, oral pharynx with moist mucous membranes.  Respiratory: No increased work of breathing, no crackles or wheezing  Cardiovascular: Normal apical impulse, regular rate and rhythm, normal S1 and S2, no S3 or S4, and no murmur noted  Abdomen: Normal bowel sounds, soft, non-distended, non-tender.  Skin: normal skin color, texture, turgor and no redness, warmth, or swelling  Musculoskeletal: There is no redness, warmth, or swelling of the joints.  Full range of motion noted.  Motor strength is 5 out of 5 all extremities bilaterally.  Tone is normal.  Neurologic: Awake, alert, holding eye contact. Does not respond verbally to questions when asked. Pt is restless, fidgeting in bed. Cranial nerves II-XII are grossly intact.  Motor is 5 out of 5 bilaterally.  Gait is normal.  Psychiatric:  Behavior: Behavior normal, following some basic commands    Data   Data reviewed today: I reviewed all medications, new labs and imaging results over the last 24 hours. I personally reviewed the EKG tracing showing NSR and the chest x-ray image(s) showing Perihilar and bibasilar mixed interstitial and streaky airspace opacities    Recent Labs   Lab 06/27/22  1610 06/23/22  0814   WBC 12.5* 8.9   HGB 9.5* 9.6*   MCV 93 93   * 865*   *  --    POTASSIUM 4.0  --    CHLORIDE 97*  --    CO2 29  --    BUN 21.2  --    CR 0.57  --    ANIONGAP 8  --    TAY 9.3  --    *  --    ALBUMIN 3.6  --    PROTTOTAL 6.2*  --    BILITOTAL 0.6  --    ALKPHOS 134*  --    ALT 6*  --    AST 19  --    LIPASE 15  --      Recent Results (from the past 24 hour(s))   CT Head w/o Contrast    Narrative    CT HEAD W/O CONTRAST 6/27/2022 6:12 PM    History: fall, n/v     Comparison: Head CT 5/26/2022    Technique: Using multidetector thin collimation  helical acquisition  technique, axial, coronal and sagittal CT images from the skull base  to the vertex were obtained without intravenous contrast.   (topogram) image(s) also obtained and reviewed.    Findings: There is no intracranial hemorrhage, mass effect, or midline  shift. Gray/white matter differentiation in both cerebral hemispheres  is preserved. Ventricles are proportionate to the cerebral sulci. The  basal cisterns are clear. Low-attenuation in the periventricular and  subcortical white matter, likely chronic small vessel ischemic  disease. Moderate diffuse parenchymal volume loss.    Posterior parietal scalp hematoma. The bony calvaria and the bones of  the skull base are normal. Minimal paranasal sinus mucosal thickening.      Impression    Impression:  1. No acute intracranial pathology.   2. Posterior parietal scalp hematoma. No underlying calvarial  fracture.    I have personally reviewed the examination and initial interpretation  and I agree with the findings.    TOMMY ADORNO MD         SYSTEM ID:  [id number]   CT Cervical Spine w/o Contrast    Narrative    CT CERVICAL SPINE W/O CONTRAST 6/27/2022 6:12 PM    Provided History: fall    Comparison: CT cervical spine 5/26/2022    Technique: Using multidetector thin collimation helical acquisition  technique, axial, coronal and sagittal CT images through the cervical  spine were obtained without intravenous contrast.     Findings:  Grade 1 retrolisthesis of C5 on C6.. Straightening of the normal  cervical lordosis. Subacute nondisplaced fracture of the C7 anterior  superior endplate osteophyte, similar to findings on CT 5/26/2022. No  acute fracture or subluxation. No prevertebral edema. There is  advanced multilevel disc height narrowing most significant at C5-C6.  The findings on a level by level basis are as follows:    C2-3:  No significant spinal canal or neural foraminal stenosis.    C3-4:  Right greater than left facet arthropathy and  uncinate  spurring. Moderate right and mild left neural foraminal stenosis. No  significant spinal canal stenosis.    C4-5:  Right greater than left facet arthropathy and uncinate  spurring. Moderate right and mild left neural foraminal stenosis. Mild  spinal canal narrowing.    C5-6:  Circumferential disc osteophyte complex with bilateral facet  arthropathy and uncinate spurring. Moderate-severe bilateral neural  foraminal stenosis. Mild to moderate spinal canal stenosis.    C6-7:  Bilateral facet arthropathy and uncinate spurring. Moderate  right and mild left neural foraminal stenosis. Mild spinal canal  narrowing.    C7-T1:  No significant spinal canal or neural foraminal stenosis.     No abnormality of the paraspinous soft tissues.      Impression    Impression:   1. No acute fracture or traumatic subluxation of the cervical spine.  Subacute nondisplaced fracture of the C7 anterior superior osteophyte,  similar to CT 5/26/2022.  2. Multilevel cervical spondylosis most pronounced at C5-C6.    I have personally reviewed the examination and initial interpretation  and I agree with the findings.    TOMMY ADORNO MD         SYSTEM ID:  [id number]   XR Chest 2 Views    Narrative    EXAM: XR CHEST 2 VW  6/27/2022 8:24 PM     HISTORY:  falls, vomiting, eval aspiration       COMPARISON:  None    TECHNIQUE: AP and lateral views of the chest    FINDINGS:     The trachea is midline. The cardiomediastinal silhouette is enlarged.  The pulmonary vasculature is indistinct. Perihilar and bibasilar mixed  interstitial and streaky airspace opacities. Retrocardiac opacity. No  appreciable pleural effusion or pneumothorax. No acute osseous  abnormality.  Degenerative changes of the thoracic spine.      Impression    IMPRESSION:   1. Perihilar and bibasilar mixed interstitial and streaky airspace  opacities, possibilities include infection/aspiration, atelectasis  and/or edema.  2. Cardiomegaly.    I have personally reviewed the  examination and initial interpretation  and I agree with the findings.    ALDEN KENT,          SYSTEM ID:  [id number]

## 2022-06-28 NOTE — PROGRESS NOTES
"[facility]    OBS --> MEDICINE TRANSFER PROGRESS NOTE    Medicine Progress Note - Hospitalist Service, GOLD TEAM     Date of Admission:  6/27/2022      Addendum  June 28, 2022  4:45 AM     Given downtrending blood pressure now w/ sbp in 90's and strict NPO, ordered 500cc LR bolus 100/hr x5 hour        HPI:  87-year-old female with history of advanced Alzheimer's dementia, recent and recurrent falls in her memory care facility with last admission 5/26/2022 through 5/31/2022 after fall with C7 osteophyte fracture managed conservatively, presented from her facility which reported with 2 unwitnessed falls on 6/26 and then found by physical therapy on the day of admission 6/27 to have vomiting and diarrhea in her room.  The facility also noticed that she had been more sleepy recently as her Seroquel dose has been increased.    Per her most neurology note at [facility] everywhere \"My impression is that the patient has advanced Alzheimer's disease. She has significant behavioral expressions. I would increase citalopram to 30 milligrams daily. I will also increase quetiapine to 50 milligrams b.i.d.. This may be further titrated as necessary. It also appears that the facility has been planning to taper donepezil. I am concerned that the tapering may further complicate her behaviors. Until her symptoms are better controlled, I advised continuing donepezil 5 milligrams daily.\"    ED work-up was notable for urinalysis without pyuria, high-sensitivity troponin 22 trended to 23 without overt evidence of ischemia on EKG, and chest x-ray showing bilateral diffuse streaky airspace opacities.  Patient was initially admitted to ED observation unit and treated for pneumonia with levofloxacin p.o.    At approximately 1 AM on 6/28 nursing staff noticed that patient was hypoxic in the low 80s.  Her oxygen therapy was escalated to oxime mask and a rapid response was called.  Patient " was noted to have very shallow and infrequent breaths and obtunded.  BiPAP was applied.  A venous blood gas 20 minutes after BiPAP showed a venous PCO2 of 111 and pH of 7.07.  Her oxygen saturations improved some on BiPAP oscillating between mid 80s in the 90s.  Repeat chest x-ray showed grossly unchanged streaky bibasilar opacities on my read.    The emergency department observation team had been unable to contact any family.  Notably the patient has a POLST form scanned into the chart from 2021 that confirms DNR/DNI status, with acceptable minimally invasive therapies.  Initially the observation unit contacted the ICU, however given patient's DNI status felt the patient would be more appropriate for medicine and patient was admitted to [facility].    I called all the numbers for contacts listed in the patient's chart, including daughter Marisol her mobile phone 3 times, work phone, home phone, and son Perfecto mobile phone, as well as son-in-law and daughter-in-law, and all went to voicemail.    At 3 AM on 6/18 we were able to track down in the chart phone number for a cabin that son Perfecto and left.  Perfecto answered this phone number ([phone]) and I was able to update him on his mother's tenuous condition and respiratory failure.  We discussed and agreed that the POLST is up-to-date, and discussed that intubation at this point may not improve her quality of life.  Also discussed that their family does prioritize comfort for the patient, and we talked about what comfort care would look like specifically removing the BiPAP and using medications including morphine and Ativan for comfort.  This possibility is appealing to Perfecto pending the patient's progress, however he will discuss with his sisters and plans to get back to me early this morning with any updates.  In the meantime we agreed to continue with BiPAP as her intervention.    Assessment & Plan          87-year-old female with history of advanced Alzheimer's dementia,  recent and recurrent falls in her memory care facility with last admission 5/26/2022 through 5/31/2022 after fall with C7 osteophyte fracture managed conservatively, presented from her facility which reported with 2 unwitnessed falls on 6/26 and then found by physical therapy on the day of admission 6/27 to have vomiting and diarrhea in her room. She was initially OBS but transferred to medicine for acute respiratory failure, likely due to aspiration.    #Acute hypoxemic and hypercarbic respiratory failure  #Acute toxic-metabolic encephalopathy  - Given reported history of vomiting found in the patient's room at facility, as well as initial nursing assessments here describing poor swallowing ability, and in the setting of reportedly being more sleepy with increased Seroquel dosing at her facility, at this point highly suspect aspiration worsened by encephalopathy diminishing air exchange.  - We will hold on IV fluids given unknown cardiac history in our system, patient likely dry but do not want to contribute to potential pulmonary edema.  Will give fluids if becomes tachycardic or hypotensive  - Notably in the obs unit patient was given a dose of Zyprexa 5 mg because she was being physically aggressive towards staff.  This might also have contributed to her encephalopathy and would encourage a lower dose or avoiding this medication in the future  *DuoNeb as needed every 4 hours (received a neb and nobs but this did not help oxygenation, lung sounds are coarse without clear wheezes)  *Airway clearance therapies once patient more alert  *Presently continue BiPAP with goal oxygen saturation greater than 90%, strict n.p.o. and aspiration precautions  *Lower concern for pneumonia given procalcitonin 0.13 however given leukocytosis and tenuous status we will continue to treat for pneumonia, switch from levofloxacin to moxifloxacin (6/28 -) given anaphylactic penicillin allergy  *VBG to assess trend w/ AM  labs    #Alzheimer dementia with behavioral disturbance  - Incresed seroquel per neurologist to 50mg BID two months ago as above, unclear benefit and more somnolence  *ordered decreased dose back to prior dose 37.5 mg BID (HELD until encephalopathy improves)  *continue with home donepezil 5mg daily  *continue with home citalopram 30mg daily    #Recurrent falls  - Consider PT/OT pending improvement and GOC discussions       Diet: NPO for Medical/Clinical Reasons Except for: No Exceptions  DVT Prophylaxis: Hold anticoagulation for now given recent head trauma  Lizarraga Catheter: Not present  Central Lines: None  Cardiac Monitoring: ACTIVE order. Indication: elevated troponin in altered patient  Code Status: No CPR- Do NOT Intubate      Disposition Plan      Expected Discharge Date: 06/29/2022                The patient's care was discussed with the Bedside Nurse, Patient and Patient's Family.    Bowen Mckeon MD  Hospitalist Service, [facility]  Securely message with the Vocera Web Console (learn more here)  Text page via [employer] Paging/Directory     This patient is critically ill with severe respiratory acidosis. I spend 60 minutes of critical care time including coordinating transfer with rapid response team and ED physician, respiratory assessment, interpreting stat imaging studies and blood gas analysis, and reaching out emergently to discuss prognosis and plan with family.     Clinically Significant Risk Factors Present on Admission                          ______________________________________________________________________    Interval History   See HPI above. Review of systems unable to obtain due to encephalopathy.     Data reviewed today: I reviewed all medications, new labs and imaging results over the last 24 hours. I personally reviewed the EKG tracing showing sinus tachycardia and the chest x-ray image(s) showing bilateral streaky  opacities.    Physical Exam   Vital Signs: Temp: (!) 96.1  F (35.6  C) Temp src: Axillary BP: 104/79 Pulse: 85   Resp: 16 SpO2: 96 % O2 Device: BiPAP/CPAP Oxygen Delivery: 10 LPM  Weight: 112 lbs 1.66 oz    Physical Exam   Constitutional: lying supine NAD, does have some purposeful hand movments  HEENT: left> R puil in setting of prior cataract surgery fixed. Bipap in place  Neck: Symmetric  Cardiovascular: regular without murmurs or gallops  Pulmonary/Chest: coarse upper airway noises and rhonchi. Mild respiratory distress by accessory muscles  GI: Soft, non-tender , non-distended    Musculoskeletal:  No lower extremity edema   Skin: Skin is warm and dry  Neurological: Obtunded  Psychiatric:  No psychomotor agitation      Data   Medications     - MEDICATION INSTRUCTIONS -         acetaminophen  1,000 mg Oral Once     atorvastatin  10 mg Oral Daily     carboxymethylcellulose PF  2 drop Both Eyes BID     citalopram  30 mg Oral Daily     divalproex sodium delayed-release  125 mg Oral BID     donepezil  5 mg Oral Daily     moxifloxacin  400 mg Intravenous Q24H     polyethylene glycol  17 g Oral Daily     [Held by provider] QUEtiapine  37.5 mg Oral BID     sodium chloride (PF)  3 mL Intracatheter Q8H

## 2022-06-28 NOTE — PROGRESS NOTES
"Brief Hospital Medicine Progress Note    Subj:  No acute interval events since Dr. Mckeon's note this early morning. Continues on BPAP. She is able to express that she has neck pain, mostly left sided. Reports breathing is abnormal but not necessarily labored. Otherwise denies symptoms.     Spoke with daughter and decision-maker Marisol Hsu this afternoon. She and family are in agreement with current limited therapies including NIPPV, antibiotics, fluids, etc.     Marisol relays that Debi was found down and had vomited, possibly contributing to aspiration. However, recent baseline is up, walking on her own, eating and drinking independently daily.     Additionally, recent changes in home medications for behavioral disturbance had been noted to cause sedation.    Obj:  Exam  BP (!) 146/82   Pulse 103   Temp 99.7  F (37.6  C) (Axillary)   Resp 16   Ht 1.676 m (5' 5.98\")   Wt 50.8 kg (112 lb 1.7 oz)   SpO2 95%   BMI 18.10 kg/m      Gen: Awake and alert, appears comfortable.  HEENT: Moist mucous membranes, PERRL, sclerae anicteric. Ecchymosis anterior to L ear.  CV: Regular rhythm, normal rate, S1/S2, extremities warm and well perfused, no lower extremity edema.  Pulm: Normal work of breathing on BPAP, lung sounds diminished, no wheezing.  GI: Nontender, nondistended, soft. +bowel sounds.  Skin: Warm, dry, no jaundice.  Neuro: Awake, oriented to self and hospital (not [facility]), speech normal, moves all extremities symmetrically.  Psych: Mood is fair, affect is congruent.    Assessment:   Ms. Morton is an 87 year old woman with Alzheimer's dementia, recent falls, and sedation from medications who presented with a fall from memory care and admitted to observation. CT head and C-spine found no hemorrhage or fracture. She developed hypoxia and was transferred from [facility] then found to have bibasilar infiltrates concerning for aspiration    Plan:  - Continue levofloxacin (rather than " moxifloxacin)  - Continue BPAP or if able HFNC; NPO if the former; discussed with RT  - Continue MIVF, adding dextrose  - Haloperidol 2mg IV or PO q6h prn agitation  - Soft restraints in place for pulling mask; needs close monitoring in that circumstance  - Family affirms DNAR/DNI    Canelo Sood, DO  2:03 PM  06/28/22

## 2022-06-28 NOTE — PROGRESS NOTES
Provider and writer at bedside. Pt unable to maintain O2 sat's above 90% on oxygen via Oxymask. RRT called at 0105

## 2022-06-28 NOTE — CODE/RAPID RESPONSE
Rapid Response Team Note    Assessment   A rapid response was called on Debi Morton due to acute hypoxic respiratory failure. Recent transfer from ED room to Obs bed. Admitted with pneumonia. Found to have escalating oxygen requirements (previously on 1 liter). On 15 liters oxymask sats were 75%. Currently unresponsive.  Patient is DNR/DNI per POLST. Confirmed by primary team. Attempted to contact family, no answer.     Plan   - Chest x-ray  - VBG  - Bipap ordered by primary team  - Transfer to medicine service for on-going cares    Addendum: VBG resulted at ph 7.07 and PCO2 111. Drawn 20 minutes after being on Bipap 100% FIO2      CESAR Saucedo CNP  [facility] RRT [facility] Job Code Contact #[phone]  [facility] Paging/Directory    Admission Diagnosis:   Weakness [R53.1]  Acute hypoxemic respiratory failure (H) [J96.01]    Physical Exam   Temp: (!) 96.1  F (35.6  C) Temp  Min: 96.1  F (35.6  C)  Max: 97.8  F (36.6  C)  Resp: 16 Resp  Min: 16  Max: 18  SpO2: 96 % SpO2  Min: 82 %  Max: 97 %  Pulse: 85 Pulse  Min: 82  Max: 88    No data recorded  BP: 104/79 Systolic (24hrs), Av , Min:104 , Max:149   Diastolic (24hrs), Av, Min:59, Max:81     I/Os: No intake/output data recorded.     Exam:   General: acutely ill appearing  Mental Status: unresponsive.      Significant Results and Procedures   Lactic Acid: No results for input(s): LACT, LACTS in the last 42564 hours.  CBC:   Recent Labs   Lab Test 22  1610 22  0814 22  0107   WBC 12.5* 8.9 11.7*   HGB 9.5* 9.6* 10.5*   HCT 29.0* 30.0* 33.2*   * 865* 892*

## 2022-06-28 NOTE — PLAN OF CARE
ICU End of Shift Summary. See flowsheets for vital signs and detailed assessment.    Changes this shift: Confused and agitated.  Pulling off mitts and pulling off Bipap all day.  Swings at nurse and grabs nurses arms when mits come off.  Thrashes extremities.  PRN haldol given and effective.  Intermittently will follow commands. Patient was on bipap for most of the day, now on HFNC. Intermittent desaturations into the mid 80s.  Lung sounds coarse. Daughter, Marisol, came to visit.      Plan: Try to wean oxygen needs as able       Goal Outcome Evaluation: ongoing

## 2022-06-28 NOTE — PROGRESS NOTES
SPIRITUAL HEALTH SERVICES  SPIRITUAL ASSESSMENT Progress Note  [facility] 4C     REFERRAL SOURCE: Admission Request     Pt was on Bipap machine and was not responsive when  arrived for a visit.   attempted to call pt's daughter, but was unable to reach her. (Marisol)   will continue to check in and provide support to pt/family as needed.     PLAN: SHS will continue to be available     Clary Loyd  Pager: [phone]

## 2022-06-28 NOTE — PLAN OF CARE
SLP: Swallow evaluation order received per MD. The pt is requiring continuous BiPAP and minimally responsive per discussion with RN. Possible GOC discussion this date. Will follow and re-attempt this PM or tomorrow as appropriate.

## 2022-06-28 NOTE — PROGRESS NOTES
Writer received report from ED, pt arrived to unit at @2356. Pt disoriented x4, confused. Pt placed on continuous pulse ox on arrival to unit. While in room, writer noticed pt O2 sat's drop mid to high 80's. Writer increased oxygen. Patient continued to require increased O2 needs. Pt switched to oxymask. LUIS ALBERTO provider notified and assessed pt at bedside. RRT called.

## 2022-06-28 NOTE — PROGRESS NOTES
"CLINICAL NUTRITION SERVICES - ASSESSMENT NOTE     Nutrition Prescription    RECOMMENDATIONS FOR MDs/PROVIDERS TO ORDER:  --Advance diet as medically able/appropriate.     Malnutrition Status:    Unable to determine due to unable to perform all aspects for NFPE    Recommendations already ordered by Registered Dietitian (RD):  None at this time    Future/Additional Recommendations:  --Pending diet advancement, order vanilla Ensure Enlive between meals.   --Weight trends.  --GOC.      REASON FOR ASSESSMENT  Debi Morton is a/an 87 year old female assessed by the dietitian for Admission Nutrition Risk Screen for positive for unsure weight loss and decreased appetite.     NUTRITION HISTORY  PMH Alzheimer's dementia, GERD, HTN, osteoporosis with recent hip fracture and C7 osteophyte fracture, recurrent falls, and HLD who presents to the Emergency Department from assisted living with nausea. Per H&P, pt was found down at memory care unit with +emesis and diarrhea in her room. History of COVID+ 5/22 with test currently pending. GOC discussions, as pt is on continuous BIPAP. Per notes, pt is DNR/DNI. Pt was seen by RD during admission in 2021. Pt tried vanilla Ensure during that admission.     CURRENT NUTRITION ORDERS  Diet: NPO; SLP consulted but unable to evaluate 2/2 continuous BIPAP requirements and altered mentation, per note.     LABS  Labs reviewed    MEDICATIONS  Medications reviewed  D5LR @ 50 ml/hr    ANTHROPOMETRICS  Height: 167.6 cm (5' 5.984\")  Most Recent Weight: 50.8 kg (112 lb 1.7 oz)    IBW: 59.1 kg  BMI: Underweight BMI <18.5  Weight History: 9% wt loss x 1 month  Wt Readings from Last 30 Encounters:   06/28/22 50.8 kg (112 lb 1.7 oz)   06/23/22 65.1 kg (143 lb 8 oz)   06/09/22 59.4 kg (131 lb)   06/02/22 59.4 kg (131 lb)   05/31/22 56.1 kg (123 lb 10.9 oz)   05/26/22 59.4 kg (131 lb)   05/19/22 59.4 kg (131 lb)   04/14/22 54.4 kg (120 lb)   03/31/22 54.4 kg (120 lb)   03/23/22 54.4 kg (120 lb) "   03/16/22 53.4 kg (117 lb 11.2 oz)   03/03/22 53.4 kg (117 lb 11.2 oz)   02/23/22 53.4 kg (117 lb 11.2 oz)   01/06/22 53.4 kg (117 lb 11.2 oz)   10/14/21 52.1 kg (114 lb 12.8 oz)   06/26/21 72.6 kg (160 lb)   04/21/21 54.4 kg (120 lb)   11/11/20 59.4 kg (131 lb)     Dosing Weight: 51 kg admission weight    ASSESSED NUTRITION NEEDS  Estimated Energy Needs: 7161-8883 kcals/day (25 - 30 kcals/kg)  Justification: Maintenance  Estimated Protein Needs: 50-61+ grams protein/day (1 - 1.2+ grams of pro/kg)  Justification: Maintenance, preservation of LBM  Estimated Fluid Needs: (1 mL/kcal)   Justification: Maintenance and Per provider pending fluid status    PHYSICAL FINDINGS  See malnutrition section below.     MALNUTRITION  % Intake: Unable to assess - memory care unit provider notes report good appetite  % Weight Loss: > 5% in 1 month (severe) - difficult to fully assess 2/2 varying weight documented in memory care notes  Subcutaneous Fat Loss: Unable to assess  Muscle Loss: Unable to assess  Fluid Accumulation/Edema: None noted  Malnutrition Diagnosis: Unable to determine due to unable to perform all aspects for NFPE    NUTRITION DIAGNOSIS  Inadequate oral intake related to altered mentation, increased O2 requirements on BIPAP as evidenced by NPO status x at least 1 day.       INTERVENTIONS  Implementation   None at this time     Goals  Diet adv within 2-3 days.     Monitoring/Evaluation  Progress toward goals will be monitored and evaluated per protocol.    Divya Brown, MS, RD, LD, [facility]  MICU pager: [phone]  ASCOM: [phone]

## 2022-06-28 NOTE — CONSULTS
Care management aware of consult and will follow patient for discharge planning.     Latesha Vee RN Care Coordinator   MICU/SICU  [phone]

## 2022-06-28 NOTE — PLAN OF CARE
ICU End of Shift Summary. See flowsheets for vital signs and detailed assessment.    Changes this shift: Still minimally responsive. Continues on BIPAP. Able to wean FIo2.     Plan: Continue weaning FiO2 as able. Family to come today and make decisions about next steps in treatment plan.

## 2022-06-28 NOTE — PROGRESS NOTES
Observation Unit Transfer Summary     Patient ID:  Debi Morton  MRN: [id number]  87 year old  YOB: 1934    Observation Admit Date: 6/27/2022    ED Admitting Attending: Sheri May CNP    Transfer Date and Time: June 28, 2022 at 2:28 AM     Transferring Observation Provider: Sheri May CNP    Admission Diagnoses:     Weakness  Pneumonia  Dementia    Transfer Diagnoses:   Hypoxic respiratory failure    Emergency Department and Observation Course:      Debi Morton is a 87 year old female admitted on 6/27/2022. She has a history of Alzheimer's dementia, GERD, HTN, osteoporosis with recent hip fracture and C7 osteophyte fracture, recurrent falls,  and HLD who presents to the Emergency Department from assisted living with nausea.     ##Recurrent falls  ##Weakness  ##Pneumonia, Aspiration vs CAP  ##Nausea & Vomiting  ##Diarrhea  Elderly female with a history of dementia Who presents to the emergency department after being found down in her room at her memory care facility.  Vomit and diarrhea were in her room.  Per chart review, patient was treated for UTI about a month ago, with a 5-day course of Bactrim.  Patient fell 1 week ago and was found to have a large subcutaneous hematoma overlying the proximal femoral shaft.  Patient has a history of agitation and has been on Seroquel now 3 times daily.  Per the patient's daughter, she is more sleepy lately.  In the ED, patient is initially tachycardic, with heart rate 108. Pulse oximetry shows O2 sat 86% on room air, patient has been started on 2 L of oxygen.  Now only requiring 1 L per nasal cannula. Labs show leukocytosis, WBC: 12.5.  Procalcitonin 0.13.  Troponin 22, then repeat 23. Chest x-ray shows perihilar and bibasilar mixed interstitial and streaky airspace opacities, possibilities include infection/aspiration atelectasis or edema. UA is negative for infection. Enteric stool panel is pending. CT cervical spine and CT head showed no  acute fracture or traumatic subluxation.  Posterior parietal scalp hematoma. Medications: Levaquin 750 mg IV, Zyprexa ODT 5 mg. Plan: [place] to ED Observation for management of pneumonia with likely discharge back to facility in 1-2 days. Per nursing, patient having difficulty swallowing pills. Unclear etiology, will have speech do eval in AM. On arrival in ED Obs, the pt was stable, requiring 1L oxygen via nasal cannula. Pt restless in bed.  - Continuous pulse oximetry  - Oxygen as needed  - DuoNebs  - Levaquin daily  - Follow blood cultures, Enteric panel, C. difficile testing  - CBC, BMP in a.m.  - Speech language pathology consult     ##Dementia  Per chart review, History of cognitive decline and MRI brain 1/27/2020 showed moderate volume loss with progressive atrophy and progressive chronic small vessel disease compared to 2012. She was seen by Dr Mirza at the [facility] [place] [place] for Memory and Aging 9/11/2020 who felt she likely has Alzheimer's type dementia. Neuropsychiatric testing was done 9/23/2020 and revealed deficits in attention, learning, memory and language. She had MMSE 19/30 and CPT 4.5/5.6. Pt hospitalized at [facility]  6/25/2021 after a fall resulting in a left intertrochanteric femur fracture, s/p IM nailing. Post op course was complicated by encephalopathy and urinary retention. She discharged to TCU and moved to Memory Care upon her return 8/17/2021. Pt had 2 falls 5/26/2022, with head strike and low back pain. CT head showed a right posterior scalp hematoma, no acute intracranial process. CT cervical spine showed an acute nondisplaced C7 osteophyte fracture, as well as multilevel degenerative changes. Neurosurgery recommended nonoperative management. Cervical collar was attempted, but dc'd as it contributed to pt agitation and increased the likelihood of falls. UC grew 10,000-50,000  E coli and she was treated with ceftriaxone followed by Bactrim for 5 days. Tonight, family reportedly was  "concerned that patient seemed weaker than baseline, so planned for ED Observation stay for further monitoring.   - Continue prior to admission Depakote, Seroquel, aricept, citalopram     ##Hyponatremia:   NA: 134. Consider additional IV fluid in the morning, given patient's current agitation, will hold off at this time.  - BMP in a.m.     ##HLD:   Continue PTA atorvastatin     ##Covid Recovered.  Pt tested positive for Covid,  5/21/2022    On arrival in ED Observation, patient was examined and found to be stable. One hour later, This provider was paged By nursing as patient was consistently hypoxic, with oxygen saturations in the low 80's in spite of mask and oxygen at 8L. Respiratory was paged and then a Rapid Response called. ED MD, Dr Calle, did come evaluate patient as well and it was agreed that patient has failed observation and should be placed on BiPap. Pt's daughter was called by both Dr Calle and this writer, with no answer. Voicemail was left. Patient has an active Advance Directive on file that makes her DNR/DNI but allows for some interventions such as BiPap or CPap. This was followed. On exam, pt tachypneic, with increased work of breathing. Pt is not responding to external stimuli.      At this time the patient has failed observation management due to hypoxic respiratory failure and will be transferred to inpatient status.      Consults: [facility] Response team    DATA:    Transfer Exam:    /59   Pulse 83   Temp 97.8  F (36.6  C) (Axillary)   Resp 16   Ht 1.676 m (5' 5.98\")   Wt 51.6 kg (113 lb 12.1 oz)   SpO2 96%   BMI 18.37 kg/m    Constitutional: awake, alert, cooperative, no apparent distress, and appears stated age  Eyes: Lids and lashes normal, pupils equal, round and reactive to light, extra ocular muscles intact, sclera clear, conjunctiva normal  ENT: Normocephalic, atraumatic, external ears without lesions, oral pharynx with moist mucous membranes.  Respiratory: Increased work " of breathing, lungs sound coarse. No crackles or wheezing.   Cardiovascular: Normal apical impulse, regular rate and rhythm, normal S1 and S2, no S3 or S4, and no murmur noted  Abdomen: Normal bowel sounds, soft, non-distended, non-tender.  Skin: Saleem  Musculoskeletal: There is no redness, warmth, or swelling of the joints.  Full range of motion noted.  Motor strength is 5 out of 5 all extremities bilaterally.  Tone is normal.  Neurologic: Eyes are open but staring at wall. Pt does not respond verbally.       Current Medications:    No current outpatient medications on file.       Medications Prior to Admission:    Medications Prior to Admission   Medication Sig Dispense Refill Last Dose     divalproex sodium delayed-release (DEPAKOTE) 125 MG DR tablet TAKE 1 TABLET BY MOUTH TWICE DAILY 60 tablet 11      acetaminophen (TYLENOL) 325 MG tablet Take 3 tablets (975 mg) by mouth 3 times daily 90 tablet 11      alendronate (FOSAMAX) 70 MG tablet TAKE ONE TABLET BY MOUTH EVERY 7 DAYS 4 tablet 97      ASPIRIN LOW DOSE 81 MG chewable tablet TAKE 1 TABLET BY MOUTH EVERY MORNING (Patient taking differently: HOLD) 36 tablet PRN      atorvastatin (LIPITOR) 10 MG tablet TAKE 1 TABLET BY MOUTH ONCE DAILY 28 tablet PRN      CALCIUM ANTACID 500 MG chewable tablet TAKE 1 TABLET BY MOUTH ONCE DAILY 30 tablet 11      citalopram (CELEXA) 20 MG tablet TAKE ONE AND ONE-HALF TABLETS (30 MG) BY MOUTH ONCE DAILY 135 tablet 11      cyanocobalamin (VITAMIN B-12) 100 MCG tablet TAKE 1 TABLET BY MOUTH ONCE DAILY 31 tablet 11      donepezil (ARICEPT) 5 MG tablet TAKE 1 TABLET BY MOUTH ONCE DAILY 90 tablet 3      hypromellose-dextran (HYPROMELLOSE-DEXTRAN 0.3-0.1%) 0.1-0.3 % ophthalmic solution Place 2 drops into both eyes 2 times daily 30 mL 11      Multiple Vitamins-Minerals (PRESERVISION AREDS) TABS TAKE 1 TABLET BY MOUTH ONCE DAILY 28 tablet PRN      polyethylene glycol (MIRALAX) 17 GM/Dose powder MIX 17GM OF POWDER IN 8OZ OF WATER UNTIL  COMPLETELY DISSOLVED. DRINK SOLUTION BY MOUTH ONCE DAILY 510 g 97      QUEtiapine (SEROQUEL) 50 MG tablet Take 1 tablet (50 mg) by mouth 2 times daily Plus 25 mg po at 2 pm 90 tablet 11      vitamin D2 (ERGOCALCIFEROL) 89159 units (1250 mcg) capsule TAKE ONE CAPSULE BY MOUTH ONCE A WEEK 4 capsule PRN        Significant Diagnostic Studies:     Results for orders placed or performed during the hospital encounter of 06/27/22   CT Head w/o Contrast     Status: None    Narrative    CT HEAD W/O CONTRAST 6/27/2022 6:12 PM    History: fall, n/v     Comparison: Head CT 5/26/2022    Technique: Using multidetector thin collimation helical acquisition  technique, axial, coronal and sagittal CT images from the skull base  to the vertex were obtained without intravenous contrast.   (topogram) image(s) also obtained and reviewed.    Findings: There is no intracranial hemorrhage, mass effect, or midline  shift. Gray/white matter differentiation in both cerebral hemispheres  is preserved. Ventricles are proportionate to the cerebral sulci. The  basal cisterns are clear. Low-attenuation in the periventricular and  subcortical white matter, likely chronic small vessel ischemic  disease. Moderate diffuse parenchymal volume loss.    Posterior parietal scalp hematoma. The bony calvaria and the bones of  the skull base are normal. Minimal paranasal sinus mucosal thickening.      Impression    Impression:  1. No acute intracranial pathology.   2. Posterior parietal scalp hematoma. No underlying calvarial  fracture.    I have personally reviewed the examination and initial interpretation  and I agree with the findings.    TOMMY ADORNO MD         SYSTEM ID:  [id number]   CT Cervical Spine w/o Contrast     Status: None    Narrative    CT CERVICAL SPINE W/O CONTRAST 6/27/2022 6:12 PM    Provided History: fall    Comparison: CT cervical spine 5/26/2022    Technique: Using multidetector thin collimation helical acquisition  technique,  axial, coronal and sagittal CT images through the cervical  spine were obtained without intravenous contrast.     Findings:  Grade 1 retrolisthesis of C5 on C6.. Straightening of the normal  cervical lordosis. Subacute nondisplaced fracture of the C7 anterior  superior endplate osteophyte, similar to findings on CT 5/26/2022. No  acute fracture or subluxation. No prevertebral edema. There is  advanced multilevel disc height narrowing most significant at C5-C6.  The findings on a level by level basis are as follows:    C2-3:  No significant spinal canal or neural foraminal stenosis.    C3-4:  Right greater than left facet arthropathy and uncinate  spurring. Moderate right and mild left neural foraminal stenosis. No  significant spinal canal stenosis.    C4-5:  Right greater than left facet arthropathy and uncinate  spurring. Moderate right and mild left neural foraminal stenosis. Mild  spinal canal narrowing.    C5-6:  Circumferential disc osteophyte complex with bilateral facet  arthropathy and uncinate spurring. Moderate-severe bilateral neural  foraminal stenosis. Mild to moderate spinal canal stenosis.    C6-7:  Bilateral facet arthropathy and uncinate spurring. Moderate  right and mild left neural foraminal stenosis. Mild spinal canal  narrowing.    C7-T1:  No significant spinal canal or neural foraminal stenosis.     No abnormality of the paraspinous soft tissues.      Impression    Impression:   1. No acute fracture or traumatic subluxation of the cervical spine.  Subacute nondisplaced fracture of the C7 anterior superior osteophyte,  similar to CT 5/26/2022.  2. Multilevel cervical spondylosis most pronounced at C5-C6.    I have personally reviewed the examination and initial interpretation  and I agree with the findings.    TOMMY ADORNO MD         SYSTEM ID:  [id number]   XR Chest 2 Views     Status: None    Narrative    EXAM: XR CHEST 2 VW  6/27/2022 8:24 PM     HISTORY:  falls, vomiting, eval aspiration        COMPARISON:  None    TECHNIQUE: AP and lateral views of the chest    FINDINGS:     The trachea is midline. The cardiomediastinal silhouette is enlarged.  The pulmonary vasculature is indistinct. Perihilar and bibasilar mixed  interstitial and streaky airspace opacities. Retrocardiac opacity. No  appreciable pleural effusion or pneumothorax. No acute osseous  abnormality.  Degenerative changes of the thoracic spine.      Impression    IMPRESSION:   1. Perihilar and bibasilar mixed interstitial and streaky airspace  opacities, possibilities include infection/aspiration, atelectasis  and/or edema.  2. Cardiomegaly.    I have personally reviewed the examination and initial interpretation  and I agree with the findings.    ALDEN KENT,          SYSTEM ID:  [id number]   Comprehensive metabolic panel     Status: Abnormal   Result Value Ref Range    Sodium 134 (L) 136 - 145 mmol/L    Potassium 4.0 3.4 - 5.3 mmol/L    Creatinine 0.57 0.51 - 0.95 mg/dL    Urea Nitrogen 21.2 8.0 - 23.0 mg/dL    Chloride 97 (L) 98 - 107 mmol/L    Carbon Dioxide (CO2) 29 22 - 29 mmol/L    Anion Gap 8 7 - 15 mmol/L    Glucose 121 (H) 70 - 99 mg/dL    Calcium 9.3 8.8 - 10.2 mg/dL    Protein Total 6.2 (L) 6.4 - 8.3 g/dL    Albumin 3.6 3.5 - 5.2 g/dL    Bilirubin Total 0.6 <=1.2 mg/dL    Alkaline Phosphatase 134 (H) 35 - 104 U/L    AST 19 10 - 35 U/L    ALT 6 (L) 10 - 35 U/L    GFR Estimate 87 >60 mL/min/1.73m2   Lipase     Status: Normal   Result Value Ref Range    Lipase 15 13 - 60 U/L   UA with Microscopic reflex to Culture     Status: Abnormal    Specimen: Urine, Catheter   Result Value Ref Range    Color Urine Yellow Colorless, Straw, Light Yellow, Yellow    Appearance Urine Clear Clear    Glucose Urine Negative Negative mg/dL    Bilirubin Urine Negative Negative    Ketones Urine 10  (A) Negative mg/dL    Specific Gravity Urine 1.029 1.003 - 1.035    Blood Urine Negative Negative    pH Urine 5.5 5.0 - 7.0    Protein Albumin Urine 30  (A)  Negative mg/dL    Urobilinogen Urine Normal Normal, 2.0 mg/dL    Nitrite Urine Negative Negative    Leukocyte Esterase Urine Negative Negative    Mucus Urine Present (A) None Seen /LPF    RBC Urine 1 <=2 /HPF    WBC Urine 1 <=5 /HPF    Hyaline Casts Urine 3 (H) <=2 /LPF    Narrative    Urine Culture not indicated   CBC with platelets and differential     Status: Abnormal   Result Value Ref Range    WBC Count 12.5 (H) 4.0 - 11.0 10e3/uL    RBC Count 3.12 (L) 3.80 - 5.20 10e6/uL    Hemoglobin 9.5 (L) 11.7 - 15.7 g/dL    Hematocrit 29.0 (L) 35.0 - 47.0 %    MCV 93 78 - 100 fL    MCH 30.4 26.5 - 33.0 pg    MCHC 32.8 31.5 - 36.5 g/dL    RDW 17.2 (H) 10.0 - 15.0 %    Platelet Count 852 (H) 150 - 450 10e3/uL    % Neutrophils 90 %    % Lymphocytes 2 %    % Monocytes 7 %    % Eosinophils 0 %    % Basophils 0 %    % Immature Granulocytes 1 %    NRBCs per 100 WBC 0 <1 /100    Absolute Neutrophils 11.2 (H) 1.6 - 8.3 10e3/uL    Absolute Lymphocytes 0.3 (L) 0.8 - 5.3 10e3/uL    Absolute Monocytes 0.8 0.0 - 1.3 10e3/uL    Absolute Eosinophils 0.0 0.0 - 0.7 10e3/uL    Absolute Basophils 0.0 0.0 - 0.2 10e3/uL    Absolute Immature Granulocytes 0.1 <=0.4 10e3/uL    Absolute NRBCs 0.0 10e3/uL   Troponin T, High Sensitivity     Status: Abnormal   Result Value Ref Range    Troponin T, High Sensitivity 22 (H) <=14 ng/L   Troponin T, High Sensitivity     Status: Abnormal   Result Value Ref Range    Troponin T, High Sensitivity 23 (H) <=14 ng/L   Procalcitonin     Status: Normal   Result Value Ref Range    Procalcitonin 0.13 <5.00 ng/mL   Blood gas venous     Status: Abnormal   Result Value Ref Range    pH Venous 7.07 (LL) 7.32 - 7.43    pCO2 Venous 111 (HH) 40 - 50 mm Hg    pO2 Venous 100 (H) 25 - 47 mm Hg    Bicarbonate Venous 32 (H) 21 - 28 mmol/L    Base Excess/Deficit (+/-) -0.1 -7.7 - 1.9 mmol/L    FIO2 100    EKG 12-lead, tracing only     Status: None   Result Value Ref Range    Systolic Blood Pressure  mmHg    Diastolic Blood  Pressure  mmHg    Ventricular Rate 108 BPM    Atrial Rate 108 BPM    SD Interval 170 ms    QRS Duration 84 ms     ms    QTc 460 ms    P Axis 23 degrees    R AXIS -15 degrees    T Axis 35 degrees    Interpretation ECG       Sinus tachycardia  Possible Left atrial enlargement  Borderline ECG  Unconfirmed report - interpretation of this ECG is computer generated - see medical record for final interpretation  Confirmed by - EMERGENCY ROOM, PHYSICIAN ([id number]),  MIKKI CONNOLLY ([id number]) on 6/27/2022 8:53:48 PM     EKG 12 lead     Status: None   Result Value Ref Range    Systolic Blood Pressure  mmHg    Diastolic Blood Pressure  mmHg    Ventricular Rate 92 BPM    Atrial Rate 92 BPM    SD Interval 158 ms    QRS Duration 98 ms     ms    QTc 462 ms    P Axis 39 degrees    R AXIS -16 degrees    T Axis 28 degrees    Interpretation ECG       Sinus rhythm  Possible Left atrial enlargement  Borderline ECG  Unconfirmed report - interpretation of this ECG is computer generated - see medical record for final interpretation  Confirmed by - EMERGENCY ROOM, PHYSICIAN ([id number]),  MIKKI CONNOLLY ([id number]) on 6/27/2022 8:53:58 PM     CBC with platelets differential     Status: Abnormal    Narrative    The following orders were created for panel order CBC with platelets differential.  Procedure                               Abnormality         Status                     ---------                               -----------         ------                     CBC with platelets and d...[[id number]]  Abnormal            Final result                 Please view results for these tests on the individual orders.       Signed:  Sheri May CNP  June 28, 2022 at 2:28 AM

## 2022-06-29 NOTE — PROGRESS NOTES
SLP NOTE: Per discussion with RN the patient remains inappropriate for participation with SLP and has transitioned to comfort cares. SLP will sign off at this time.

## 2022-06-29 NOTE — PROGRESS NOTES
"      Cross Cover Note     Subjective:  Called by nurse to see patient for altered mental status and desaturation following turn, briefly on 100% FiO2 to maintain sats, now weaning.     Nursing notes change in mental status over course of evening with patient now unresponsive to noxious stimuli with new agonal breathing on exam. Received PTA valproic acid dosing via IV given NPO status without additional administration of sedating meds following Haldol administration at 1400 on 6/28.  No known/obvious recurrent aspiration event.  Has been on dextrose containing fluids related to hypoglycemia with blood sugar within normal limits at time of assessment. Notably, patient has been on HFNC for majority of the day from prior BiPAP administration in setting of significant hypercapnea.  Noted to have anisocoria with left pupil greater than right on exam.  No additional localizing neurological features.    Objective:  Blood pressure 100/52, pulse 87, temperature 98.1  F (36.7  C), temperature source Axillary, resp. rate 16, height 1.676 m (5' 5.98\"), weight 50.8 kg (112 lb 1.7 oz), SpO2 91 %.    Prior labs reviewed:  BMP on 6/28 with electrolytes within normal limits, creatinine 0.57, alk phos mildly elevated at 129, ALT 8, AST 21, T bili within normal limits.   CBC 6/28 with WBC 20.3, hemoglobin 9.7, platelets 883.  VBG on 6/28 in a.m. with pH 7.31, PCO2 67.    Prior imaging reviewed:  CT head without contrast on 6/27 negative for acute intracranial bleed.      Physical Exam:   General: Agonal breathing, not responsive to sternal rub or other noxious stimuli  HEENT: Anisocoria with pupillary diameter L>R  CV: RRR, normal S1S2, no murmur, clicks, rubs appreciated  Resp: Poor air movement, lungs clear to auscultation bilaterally, no wheezes, rhonchi  Abd: Soft, non-tender, BS+, no masses appreciated  Extremities: Radial and pedal pulses intact and symmetric, no pedal edema  Neuro: Bilateral babinski negative    Assessment and " Plan:  Unclear inciting event for current change in mental status. PTA VPA administered but no other sedating medications. Anisocoria suggestive of central cause. Eval/mgmt as below.  - STAT CT head wo contrast  - STAT VBG  - Transition patient to BiPAP following VBG with close monitoring for aspiration, keep head of bed at 45 degrees. Has previously responded well to 18/6, to be discussed with RT and titrated as needed  - BMP, CBC, lactate  - VPA order held given concern for contribution to sedation/AMS    Update: VBG with pH 7.06, PCO2 greater than 130.  CBC with uptrending white count to 26.7, increased platelet count to 1088.  CMP with bicarb to 34.  Creatinine stable.  Lactate negative. CT head without contrast preliminary read without acute bleed.  Discussed with RT requirements for continuation of AVAPS due to low tidal volume.  Repeat gas on the settings with modest improvements to pH 7.09, PCO2 122 on recheck.  Attempted to call all listed children without success and will continue to reach out to family regarding change in clinical status and concern for high risk of mortality within the next 24 hours.    Blood pressure downtrending.  -Broaden antibiotics to Vanco/cefepime/Flagyl given penicillin allergy with prior tolerance to Ancef and preference for inclusion of anaerobic coverage with concern for aspiration    Amber Franklin MD  [facility]  P: [phone]

## 2022-06-29 NOTE — PHARMACY-VANCOMYCIN DOSING SERVICE
<<<RESIDENT DISCHARGE NOTE>>>     ZAKIA MILLS  MRN-[id number]    VITAL SIGNS:  T(F): 96.5 (01-13-20 @ 15:42), Max: 98 (01-13-20 @ 00:00)  HR: 64 (01-13-20 @ 15:42)  BP: 132/77 (01-13-20 @ 15:42)  SpO2: --      PHYSICAL EXAM  General: Patient resting comfortably in bed, NAD  HEENT: NCAT, conjunctiva clear, sclera white, PEERLA, EOMI, moist mucous membranes, normal orophaynx  Neck: No masses, no JVD, no cervical lymphadenopathy  Respiratory: lungs clear to auscultation bilaterally  CV: Normal rate, regular rhythm, normal S1/S2, no rubs, gallops, murmurs  GI: abdomen obese, soft, non-tender, non-distended, no masses, normal bowel sounds  Extremities: Well-perfused, no clubbing, no cyanosis, no lower extremity edema bilaterally  Skin: warm and dry  Neurology: AAOx3, mentating appropriately, follows commands    TEST RESULTS:                        14.2   7.97  )-----------( 137      ( 13 Jan 2020 05:52 )             41.6       01-13    143  |  108  |  9<L>  ----------------------------<  83  3.8   |  23  |  0.9    Ca    8.5      13 Jan 2020 05:52        FINAL DISCHARGE INTERVIEW:  Resident(s) Present: (Name: Irena De La Paz)    DISCHARGE MEDICATION RECONCILIATION  reviewed with Attending (Name: Wendy Mclean)    DISPOSITION:   [  ] Home,    [  ] Home with Visiting Nursing Services,   [ x ]  SNF/ NH,    [   ] Acute Rehab (4A),   [   ] Other (Specify:_________) Pharmacy Vancomycin Initial Note  Date of Service 2022  Patient's  11/10/1934  87 year old, female    Indication: Sepsis    Current estimated CrCl = Estimated Creatinine Clearance: 52.2 mL/min (based on SCr of 0.61 mg/dL).    Creatinine for last 3 days  2022:  4:10 PM Creatinine 0.57 mg/dL  2022:  6:47 AM Creatinine 0.57 mg/dL  2022: 12:56 AM Creatinine 0.61 mg/dL    Recent Vancomycin Level(s) for last 3 days  No results found for requested labs within last 72 hours.      Vancomycin IV Administrations (past 72 hours)      No vancomycin orders with administrations in past 72 hours.                Nephrotoxins and other renal medications (From now, onward)    None          Contrast Orders - past 72 hours (72h ago, onward)    None          [employer] Prediction of Planned Initial Vancomycin Regimen  Loading dose: 1250 mg once at 0400 on 22  Regimen: 1000 mg IV every 18 hours.  Start time: 2200 on 2022  Exposure target: AUC24 (range)400-600 mg/L.hr   AUC24,ss: 566 mg/L.hr  Probability of AUC24 > 400: 84 %  Ctrough,ss: 17.1 mg/L  Probability of Ctrough,ss > 20: 37 %  Probability of nephrotoxicity (Lodise LIZET ): 13 %          Plan:  1. Start vancomycin 1250 mg IV once, followed by 1000 mg IV q18h.   2. Vancomycin monitoring method: AUC  3. Vancomycin therapeutic monitoring goal: 400-600 mg*h/L  4. Pharmacy will check vancomycin levels as appropriate in 1-3 Days.    5. Serum creatinine levels will be ordered daily for the first week of therapy and at least twice weekly for subsequent weeks.      Yo Agustin [facility]

## 2022-06-29 NOTE — DEATH PRONOUNCEMENT
MD DEATH PRONOUNCEMENT    I was called to pronounce Debi Morton dead.    Physical Exam: Unresponsive to noxious stimuli, Spontaneous respirations absent, Breath sounds absent, Carotid pulse absent, Heart sounds absent, Pupillary light reflex absent, Corneal blink reflex absent and gag reflex absent.    Patient was pronounced dead at 01:30 PM, 2022.    Preliminary Cause of Death: aspiration pneumonia    Active Problems:    Weakness    Acute hypoxemic respiratory failure (H)       Infectious disease present?: YES    Communicable disease present? (examples: HIV, chicken pox, TB, Ebola, CJD) :  NO    Multi-drug resistant organism present? (example: MRSA): NO    Please consider an autopsy if any of the following exist:  NO Unexpected or unexplained death during or following any dental, medical, or surgical diagnostic treatment procedures.   NO Death of mother at or up to seven days after delivery.     NO All  and pediatric deaths.     NO Death where the cause is sufficiently obscure to delay completion of the death certificate.   NO Deaths in which autopsy would confirm a suspected illness/condition that would affect surviving family members or recipients of transplanted organs.     The following deaths must be reported to the 's Office:  NO A death that may be due entirely or in part to any factors other than natural disease (recent surgery, recent trauma, suspected abuse/neglect).   NO A death that may be an accident, suicide, or homicide.     NO Any sudden, unexpected death in which there is no prior history of significant heart disease or any other condition associated with sudden death.   NO A death under suspicious, unusual, or unexpected circumstances.    NO Any death which is apparently due to natural causes but in which the  does not have a personal physician familiar with the patient s medical history, social, or environmental situation or the circumstances of the terminal  event.   NO Any death apparently due to Sudden Infant Death Syndrome.     NO Deaths that occur during, in association with, or as consequences of a diagnostic, therapeutic, or anesthetic procedure.   NO Any death in which a fracture of a major bone has occurred within the past (6) six months.   NO A death of persons note seen by their physician within 120 days of demise.     NO Any death in which the  was an inmate of a public institution or was in the custody of Law Enforcement personnel.   NO  All unexpected deaths of children   NO Solid organ donors   NO Unidentified bodies   YES Deaths of persons whose bodies are to be cremated or otherwise disposed of so that the bodies will later be unavailable for examination;   NO Deaths unattended by a physician outside of a licensed healthcare facility or licensed residential hospice program   NO Deaths occurring within 24 hours of arrival to a health care facility if death is unexpected.    NO Deaths associated with the decedent s employment.   NO Deaths attributed to acts of terrorism.   NO Any death in which there is uncertainty as to whether it is a medical examiner s care should be discussed with the medical investigator.        Body disposition: Autopsy was discussed with family member:  Daughter in person.  Permission for autopsy was declined.

## 2022-06-29 NOTE — PLAN OF CARE
End of shift summary (1900-0730)-   Pt became unresponsive to painful stimuli @ 0000 check. The provider was paged and reported bedside. A blood sugar was checked and labs were ordered. Pt was switched to bipap from FNL & sent down for a STAT C.T.  Pt's PCO2 & bicarb were unable to result due to PCO2 being greater than 130. Pt's WBC count was elevated and the provider placed orders for the pt to start broad spectrum antibiotics. Provider also ordered a chest xray and a 500ml bolus of L.R. chest xray showed interval opacification of the left  hemithorax with ipsilateral mediastinal shift.   Provider ordered a NG suction that was performed by R.T with nurse at bedside. Pt's family will be contacted by the providers to discuss the next phase of care. Please see flowsheets for detailed assessments & vitals.

## 2022-06-29 NOTE — DISCHARGE SUMMARY
[facility]    Death Summary - HOSPITAL MEDICINE SERVICE    Date of Admission:  6/27/2022  Date of Death: 6/29/2022  Discharging Provider: Canelo Sood DO    Discharge Diagnoses   Acute hypoxemic and hypercarbic respiratory failure  Acute toxic-metabolic encephalopathy  Aspiration pneumonia  Left lung collapse with mediastinal shift  Alzheimer dementia with behavioral disturbance  Recurrent falls  Underweight based on BMI <18.5      Cause of death: aspiration pneumonia; hypoxic and hypercapnic respiratory failure    Hospital Course   Ms. Morton is an 87 year old woman with Alzheimer's dementia, recent falls, and sedation from medications who presented with a fall from memory care and who was admitted to observation. She had been found down, and was noted to have vomited. Unclear if there was LOC. Initial CT head and C-spine found no hemorrhage or fracture. On HOD#1 she developed acute hypoxia and was transferred from [facility] to [facility] (boarding in MICU); in workup for hypoxia she was found to have bibasilar infiltrates concerning for aspiration, which fit the circumstances of dementia with fall, possible LOC, and vomiting. She was supported with noninvasive positive-pressure ventilation, and delirium managed with haloperidol, though this was used infrequently.    Unfortunately on HOD#2 she again developed acute hypoxia and, over several hours, decline in mentation to the point of unresponsiveness. Blood gas reflected severe hypercapnia and respiratory acidosis causing acidemia (venous pH 7.09). Chest XR showed collapse/white-out of the left lung which was presumed to have been caused by mucous plugging given the chronicity and extent of lung involvement. She continued to be hypercapnic and unresponsive despite maximal BPAP settings, and her family were called. Her daughter arrived along with a nephew and niece this morning to see her, understanding that she had suffered lung  collapse and worsening gas exchange. Family decided to focus on comfort, narcotics were given to palliate air hunger, and after a time BPAP was removed. She  and was pronounced dead that afternoon.    Canelo Sood DO  [facility]  ______________________________________________________________________      Significant Results and Procedures   Most Recent 3 CBC's:Recent Labs   Lab Test 22  0056 22  0647 22  1610   WBC 26.7* 20.3* 12.5*   HGB 9.6* 9.7* 9.5*   MCV 99 96 93   PLT 1,088* 883* 852*     Most Recent 3 BMP's:Recent Labs   Lab Test 22  0746 22  0056 22  0047 22  0803 22  0647 22  0231 22  1610   NA  --  142  --   --  138  --  134*   POTASSIUM  --  4.6  --   --  4.1  --  4.0   CHLORIDE  --  101  --   --  99  --  97*   CO2  --  34*  --   --  29  --  29   BUN  --  21.4  --   --  19.3  --  21.2   CR  --  0.61  --   --  0.57  --  0.57   ANIONGAP  --  7  --   --  10  --  8   TAY  --  8.6*  --   --  8.7*  --  9.3   * 144* 140*   < > 85   < > 121*    < > = values in this interval not displayed.       Consultations This Hospital Stay   CARE MANAGEMENT / SOCIAL WORK IP CONSULT  SPEECH LANGUAGE PATH ADULT IP CONSULT  PHARMACY TO [facility] IP CONSULT    Primary Care Physician   Tu Temple    Time Spent on this Encounter   ICanelo DO, personally saw the patient today and spent greater than 30 minutes discharging this patient.

## 2022-06-29 NOTE — INTERIM SUMMARY
Spoke with Dr. Franklin about overnight events including change in mentation over hours and acute hypoxia. These are likely explained by collapse of the left lung which is presumed due to mucous plugging. Deep suction was attempted with minimal benefit. Meanwhile VBG shows severe respiratory acidosis, and despite escalation of BPAP settings she continues to have transient desat events. Debi is unresponsive, breathing appears agonal, breath sounds are absent on the left, and her extremities are warm and well perfused.    Mucous plugging occurs in the setting of delirium. Decompensation occurred ~8 hours after sedating medication (4mg haloperidol) had been given and it does not seem that overmedication and sedation was the precipitant. Difficult balance in management of delirium including removal of BiPAP mask, and sedation precipitating hypercapnia.    I spoke with Debi's daughter Marisol this morning, she had spoken with her brother who was reached overnight. Described this turn for the worse and the inability to reverse the mucous plugging short of intubation and bronchoscopy which would not necessarily 1) be tolerated or 2) be successful or 3) be lasting, if successful. She agreed that a focus on comfort and abiding by previous decision NOT to intubate would be best for her mother. She plans to visit this morning, along with a couple other family members later in the day. To that end we will continue BPAP for now, as Debi would likely  rapidly without it. Marisol assents to the use of opioids for air hunger. Hopefully will deescalate to remove the BPAP and monitor later today. Discussed with bedside nurse.    Canelo Sood DO  9:34 AM  22

## 2022-06-29 NOTE — PHARMACY-ADMISSION MEDICATION HISTORY
Admission Medication History Completed by Pharmacy    Patient not appropriate for medication history at this time as will be transitioning to comfort care.    Date completed: 06/29/22    Medication history completed by: Ebonie Cooley, PharmD

## 2022-07-02 LAB
BACTERIA BLD CULT: NO GROWTH
BACTERIA BLD CULT: NO GROWTH
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
## 2022-10-14 NOTE — TELEPHONE ENCOUNTER
Debi Morton   11/10/1934      Orders 2022:  1. Increase seroquel to 25 mg po bid plus 25 mg po daily prn for dementia with aggressive behavior  2. Decrease donepezil to 5 mg po daily X 2 weeks then discontinue  3. Obtain UA/UC if possible. Diagnosis is altered mental status  Scripst sent to pharmacy  Discussed with daughter         Tu Griffiner, CESAR CNP on 2022 at 2:55 PM     no

## 2024-04-11 NOTE — PROGRESS NOTES
"Social Work Intervention  [facility] [place]    Data/Intervention:    Patient Name:  Debi Morton  /Age:  11/10/1934 (86 year old)    Visit Type: telephone  Referral Source: Divya Clark, Ortho chris   Reason for Referral: Resources/Options     Collaborated With:    -Perfecto Morton, Patient's Son  -Marisol, Patient's Daughter   -Marychuy, Patient's Daughter     Psychosocial Information/Concerns:  Patient's family contacted Ortho clinic with concerns that Patient is being discharged from her TCU too early, as physical therapy feels that Patient is no longer able to progress and is medically ready to be discharged. Spoke with Patient's family, PerfectoMarisol and Marychuy. Patient fell at home and had a hip fracture and surgery. She was discharged on 21 to [facility] in [place]. Debi stated that Patient has progressed, but does not agree that discharging her at this time is appropriate. Debi stated that Patient is currently non weight bearing and when she is \"cleared\" she will need to return to a TCU for physical therapy.     We discussed the option of appealing the decision. Patient is supposed to discharge tomorrow from the TCU and the family was informed that they need to contact Medicare by noon today,  to appeal decision. Family feels that they do not want to \"mess up relationships\" with the care team at this time if they file an appeal. They have had a care conference but stated that they were unable to speak to the provider at that time. They feel that the provider can \"approve\" a longer TCU stay. Perfecto explained that the physical therapist \"basically told us that there is no point in filing an appeal because Medicare looks at my notes.\" Discussed that Medicare does have certain criteria to meet in order to be eligible for a TCU and if  PT determines that the Patient is medically ready to return home, Medicare will not pay. Explained that Family will need to work with the TCU social " worker with navigating the appeal process; however, family feels that the  has not been a strong advocate for them throughout the process and will not be helpful.  We discussed contacting the ombudsman as well and contact information was provided.     We also discussed the option of private pay. Family stated that it is very pricey, but they are willing to private pay until Patient is weight bearing. Offered to contact  at TCU facility; Family declined and stated that they felt that it would not be helpful.     Intervention/Education/Resources Provided:  -Provided Family with contact information for [facility]  -Discussed appealing decision and that family would need to work with social work at the [facility]  -Provided empathic listening and asked clarifying questions    Assessment/Plan:  Family encouraged to work with U  to navigate appeal process, and will consider private paying for a longer stay. Contact information for [facility] was provided to family.     Provided family with contact information and availability.    GREGORIA Askew, LGROB  Social Work   [facility] [place]   Phone: [phone]   Stable

## 2024-12-16 NOTE — PLAN OF CARE
"VS: /48 (BP Location: Right arm)   Pulse 78   Temp 97.9  F (36.6  C) (Oral)   Resp 18   Ht 1.676 m (5' 6\")   Wt 72.6 kg (160 lb)   SpO2 97%   BMI 25.82 kg/m      O2: 97% on room air. Pt denies SOB, chest pain   Output: Strains to void. Put on BSC X1, voided 20 ml. ,  St cathte ,275 ml.   Last BM: 7/2/21.  Bowel sound active x 4.    Activity: Assist x 1 with gait belt and walker. TTWB L leg.    Skin: Intact. Incision on left hip   Pain: denies pain    CMS /Neuro: Intact    Dressing: CDI    Diet: Regular diet    LDA: PIV left arm saline locked    Equipment: Walker, call light, IV pole/pump, BSC, gait belt and personal belongings     Plan: TBD   Additional Info:        " Price (Do Not Change): 0.00 Detail Level: Simple Instructions: This plan will send the code FBSD to the PM system.  DO NOT or CHANGE the price.

## (undated) DEVICE — DRSG GAUZE 4X8" NON21842

## (undated) DEVICE — ESU GROUND PAD ADULT W/CORD E7507

## (undated) DEVICE — BLADE KNIFE SURG 10 371110

## (undated) DEVICE — GUIDEWIRE BALL TIP 3.0X1000MM 1806-0085S

## (undated) DEVICE — LINEN ORTHO PACK 5446

## (undated) DEVICE — LINEN TOWEL PACK X5 5464

## (undated) DEVICE — GOWN IMPERVIOUS SPECIALTY XLG/XLONG 32474

## (undated) DEVICE — DRAPE C-ARMOR 5 SIDED 5523

## (undated) DEVICE — GLOVE PROTEXIS POWDER FREE 7.5 ORTHOPEDIC 2D73ET75

## (undated) DEVICE — SYR BULB IRRIG 50ML LATEX FREE 0035280

## (undated) DEVICE — DRSG TEGADERM 4X10" 1627

## (undated) DEVICE — SU VICRYL 0 CT-1 3X27" J430T

## (undated) DEVICE — Device

## (undated) DEVICE — SPONGE LAP 18X18" X8435

## (undated) DEVICE — STRAP KNEE/BODY 31143004

## (undated) DEVICE — GLOVE PROTEXIS W/NEU-THERA 8.0  2D73TE80

## (undated) DEVICE — DRAPE IOBAN ISOLATION VERTICAL 6619

## (undated) DEVICE — BNDG ELASTIC 4"X5YDS UNSTERILE 6611-40

## (undated) DEVICE — CAST PADDING 4" COTTON SPECIALIST 100 UNSTERILE 7054

## (undated) DEVICE — SU MONOCRYL 3-0 PS-2 18" UND Y497G

## (undated) DEVICE — REAMER SHAFT MODIFIED TRINKLE 8X510MM 0227-8510S

## (undated) DEVICE — SU MONOCRYL 2-0 SH 27" UND Y417H

## (undated) DEVICE — DRAPE C-ARM W/STRAPS 42X72" 07-CA104

## (undated) DEVICE — SU MONOCRYL 3-0 PS-1 27" Y936H

## (undated) DEVICE — COVER CAMERA IN-LIGHT DISP LT-C02

## (undated) DEVICE — SUCTION TIP YANKAUER STR K87

## (undated) DEVICE — SOL WATER IRRIG 1000ML BOTTLE 2F7114

## (undated) DEVICE — KIT PATIENT CARE HANA PROFX W/BOOT LINER SUPINE 6851

## (undated) DEVICE — DRSG ADAPTIC 3X8" 6113

## (undated) DEVICE — SOL NACL 0.9% IRRIG 1000ML BOTTLE 2F7124

## (undated) DEVICE — DRSG MEPILEX BORDER SACRUM 6.3X7.9" 282055

## (undated) DEVICE — GOWN XLG DISP 9545

## (undated) DEVICE — PREP DURAPREP REMOVER 4OZ 8611

## (undated) DEVICE — SUCTION MANIFOLD NEPTUNE 2 SYS 4 PORT 0702-020-000

## (undated) DEVICE — GLOVE PROTEXIS BLUE W/NEU-THERA 8.0  2D73EB80

## (undated) DEVICE — PREP DURAPREP 26ML APL 8630

## (undated) DEVICE — TUBING SUCTION MEDI-VAC 1/4"X20' N620A

## (undated) DEVICE — 3 X 285MM K-WIRE

## (undated) DEVICE — DRSG STERI STRIP 1/2X4" R1547

## (undated) RX ORDER — ONDANSETRON 2 MG/ML
INJECTION INTRAMUSCULAR; INTRAVENOUS
Status: DISPENSED
Start: 2021-06-26

## (undated) RX ORDER — FENTANYL CITRATE-0.9 % NACL/PF 10 MCG/ML
PLASTIC BAG, INJECTION (ML) INTRAVENOUS
Status: DISPENSED
Start: 2021-06-26

## (undated) RX ORDER — HYDROMORPHONE HYDROCHLORIDE 1 MG/ML
INJECTION, SOLUTION INTRAMUSCULAR; INTRAVENOUS; SUBCUTANEOUS
Status: DISPENSED
Start: 2021-06-26

## (undated) RX ORDER — EPHEDRINE SULFATE 50 MG/ML
INJECTION, SOLUTION INTRAMUSCULAR; INTRAVENOUS; SUBCUTANEOUS
Status: DISPENSED
Start: 2021-06-26

## (undated) RX ORDER — PROPOFOL 10 MG/ML
INJECTION, EMULSION INTRAVENOUS
Status: DISPENSED
Start: 2021-06-26

## (undated) RX ORDER — DEXAMETHASONE SODIUM PHOSPHATE 4 MG/ML
INJECTION, SOLUTION INTRA-ARTICULAR; INTRALESIONAL; INTRAMUSCULAR; INTRAVENOUS; SOFT TISSUE
Status: DISPENSED
Start: 2021-06-26

## (undated) RX ORDER — LIDOCAINE HYDROCHLORIDE 20 MG/ML
INJECTION, SOLUTION EPIDURAL; INFILTRATION; INTRACAUDAL; PERINEURAL
Status: DISPENSED
Start: 2021-06-26

## (undated) RX ORDER — GLYCOPYRROLATE 0.2 MG/ML
INJECTION INTRAMUSCULAR; INTRAVENOUS
Status: DISPENSED
Start: 2021-06-26

## (undated) RX ORDER — FENTANYL CITRATE 50 UG/ML
INJECTION, SOLUTION INTRAMUSCULAR; INTRAVENOUS
Status: DISPENSED
Start: 2021-06-26